# Patient Record
Sex: MALE | Race: ASIAN | NOT HISPANIC OR LATINO | Employment: FULL TIME | URBAN - METROPOLITAN AREA
[De-identification: names, ages, dates, MRNs, and addresses within clinical notes are randomized per-mention and may not be internally consistent; named-entity substitution may affect disease eponyms.]

---

## 2018-08-19 ENCOUNTER — PATIENT OUTREACH (OUTPATIENT)
Dept: HEALTH INFORMATION MANAGEMENT | Facility: OTHER | Age: 33
End: 2018-08-19

## 2018-08-19 ENCOUNTER — APPOINTMENT (OUTPATIENT)
Dept: RADIOLOGY | Facility: MEDICAL CENTER | Age: 33
End: 2018-08-19
Attending: EMERGENCY MEDICINE
Payer: COMMERCIAL

## 2018-08-19 ENCOUNTER — HOSPITAL ENCOUNTER (EMERGENCY)
Facility: MEDICAL CENTER | Age: 33
End: 2018-08-19
Attending: EMERGENCY MEDICINE
Payer: COMMERCIAL

## 2018-08-19 VITALS
HEIGHT: 64 IN | WEIGHT: 211.2 LBS | RESPIRATION RATE: 18 BRPM | BODY MASS INDEX: 36.06 KG/M2 | DIASTOLIC BLOOD PRESSURE: 147 MMHG | SYSTOLIC BLOOD PRESSURE: 191 MMHG | TEMPERATURE: 97.8 F | OXYGEN SATURATION: 99 % | HEART RATE: 96 BPM

## 2018-08-19 DIAGNOSIS — I10 HYPERTENSION, UNSPECIFIED TYPE: ICD-10-CM

## 2018-08-19 DIAGNOSIS — M10.9 ACUTE GOUT OF RIGHT ELBOW, UNSPECIFIED CAUSE: ICD-10-CM

## 2018-08-19 PROCEDURE — 73080 X-RAY EXAM OF ELBOW: CPT | Mod: RT

## 2018-08-19 PROCEDURE — 700102 HCHG RX REV CODE 250 W/ 637 OVERRIDE(OP): Performed by: EMERGENCY MEDICINE

## 2018-08-19 PROCEDURE — 99284 EMERGENCY DEPT VISIT MOD MDM: CPT

## 2018-08-19 PROCEDURE — A9270 NON-COVERED ITEM OR SERVICE: HCPCS | Performed by: EMERGENCY MEDICINE

## 2018-08-19 RX ORDER — INDOMETHACIN 50 MG/1
50 CAPSULE ORAL ONCE
Status: COMPLETED | OUTPATIENT
Start: 2018-08-19 | End: 2018-08-19

## 2018-08-19 RX ORDER — PREDNISONE 20 MG/1
TABLET ORAL
Qty: 24 TAB | Refills: 0 | Status: ON HOLD | OUTPATIENT
Start: 2018-08-19 | End: 2018-11-01

## 2018-08-19 RX ORDER — INDOMETHACIN 50 MG/1
50 CAPSULE ORAL
Status: DISCONTINUED | OUTPATIENT
Start: 2018-08-19 | End: 2018-08-19

## 2018-08-19 RX ORDER — HYDROCODONE BITARTRATE AND ACETAMINOPHEN 5; 325 MG/1; MG/1
1-2 TABLET ORAL EVERY 4 HOURS PRN
Qty: 12 TAB | Refills: 0 | Status: SHIPPED | OUTPATIENT
Start: 2018-08-19 | End: 2018-08-22

## 2018-08-19 RX ORDER — INDOMETHACIN 50 MG/1
50 CAPSULE ORAL 3 TIMES DAILY
Qty: 15 CAP | Refills: 0 | Status: SHIPPED | OUTPATIENT
Start: 2018-08-19 | End: 2018-08-24

## 2018-08-19 RX ADMIN — INDOMETHACIN 50 MG: 50 CAPSULE ORAL at 13:49

## 2018-08-19 ASSESSMENT — PAIN SCALES - GENERAL: PAINLEVEL_OUTOF10: 7

## 2018-08-19 NOTE — ED PROVIDER NOTES
"ED Provider Note    CHIEF COMPLAINT  Chief Complaint   Patient presents with   • Elbow Pain     right elbow pain/swelling since last night. denies injury. hst of gout        HPI  Mina Hickey Sp Burch is a 33 y.o. male who presents for evaluation of right elbow pain and swelling, is a history of gout from the time he was 16 years old with very frequent involvement of his feet but never his elbows, he denies any injury, no fever, no weakness or numbness.  He says this pain is swelling has just creeped up since yesterday.  No other complaints.  The pain is worsened with movement.  He reports his job is very physical requiring a lot of heavy lifting.    REVIEW OF SYSTEMS  Negative for fever, rash, weakness, numbness.    PAST MEDICAL HISTORY   has a past medical history of Hypertension.    SOCIAL HISTORY  Social History     Social History Main Topics   • Smoking status: Current Every Day Smoker   • Smokeless tobacco: Never Used   • Alcohol use No   • Drug use: No   • Sexual activity: Not on file       SURGICAL HISTORY  patient denies any surgical history    CURRENT MEDICATIONS  I personally reviewed the medication list in the charting documentation.     ALLERGIES  No Known Allergies    PHYSICAL EXAM  VITAL SIGNS: BP (!) 215/148   Pulse (!) 106   Temp 36.6 °C (97.9 °F)   Resp 16   Ht 1.626 m (5' 4\")   Wt 95.8 kg (211 lb 3.2 oz)   SpO2 100%   BMI 36.25 kg/m²   Constitutional: Alert in no apparent distress.  HENT: No signs of trauma.   Eyes: Conjunctiva normal, Non-icteric.   Chest: Normal nonlabored respirations  Skin: No erythema, No rash.   Musculoskeletal: Inspection of the right elbow reveals a lot of edema but no erythema, he has generalized tenderness and limited range of motion secondary to pain.  Most of the swelling involves the extensor surface.  Distally he is neurovascularly intact with a normal radial pulse and capillary refill and sensation.  Neurologic: Alert, No focal deficits noted. "   Psychiatric: Affect normal, Judgment normal.    DIAGNOSTIC STUDIES / PROCEDURES    RADIOLOGY  DX-ELBOW-COMPLETE 3+ RIGHT   Final Result      Negative right elbow series            COURSE & MEDICAL DECISION MAKING  Pertinent Labs & Imaging studies reviewed. (See chart for details)    Encounter Summary: This is a 33 y.o. male with right elbow pain and swelling with a long-standing history of gout, he is never had gout of the elbow however given his history and his overall well appearance I suspect that exactly is going on, does not make any sense for this to be a septic elbow given his history and findings on exam.  I will initiate indomethacin treatment here in the emergency department and grab an x-ray just to rule out any other obvious abnormalities.  In the absence of a positive finding on the x-ray, he will be prescribed indomethacin and a prednisone taper, I will go over strict return instructions.  Of note the patient is quite hypertensive here in the emergency department, he has been referred to primary care for further evaluation of this, I have called central scheduling department to facilitate follow-up.      DISPOSITION: Discharge Home      FINAL IMPRESSION  1. Acute gout of right elbow, unspecified cause    2. Hypertension, unspecified type        This dictation was created using voice recognition software. The accuracy of the dictation is limited to the abilities of the software. I expect there may be some errors of grammar and possibly content. The nursing notes were reviewed and certain aspects of this information were incorporated into this note.    Electronically signed by: Israel Vinson, 8/19/2018 12:52 PM

## 2018-08-19 NOTE — ED TRIAGE NOTES
Pt to triage .  Chief Complaint   Patient presents with   • Elbow Pain     right elbow pain/swelling since last night. denies injury. hst of gout

## 2018-08-19 NOTE — ED NOTES
D/C home with written and verbal instructions re: Rx, activity, f/u.  Verbalizes understanding.  Ambulated out with steady gait. Patient did not want norco prescription. RN ripped up prescriptions.

## 2018-08-19 NOTE — DISCHARGE INSTRUCTIONS
Gout  Gout is painful swelling that can occur in some of your joints. Gout is a type of arthritis. This condition is caused by having too much uric acid in your body. Uric acid is a chemical that forms when your body breaks down substances called purines. Purines are important for building body proteins.  When your body has too much uric acid, sharp crystals can form and build up inside your joints. This causes pain and swelling. Gout attacks can happen quickly and be very painful (acute gout). Over time, the attacks can affect more joints and become more frequent (chronic gout). Gout can also cause uric acid to build up under your skin and inside your kidneys.  What are the causes?  This condition is caused by too much uric acid in your blood. This can occur because:  · Your kidneys do not remove enough uric acid from your blood. This is the most common cause.  · Your body makes too much uric acid. This can occur with some cancers and cancer treatments. It can also occur if your body is breaking down too many red blood cells (hemolytic anemia).  · You eat too many foods that are high in purines. These foods include organ meats and some seafood. Alcohol, especially beer, is also high in purines.  A gout attack may be triggered by trauma or stress.  What increases the risk?  This condition is more likely to develop in people who:  · Have a family history of gout.  · Are male and middle-aged.  · Are female and have gone through menopause.  · Are obese.  · Frequently drink alcohol, especially beer.  · Are dehydrated.  · Lose weight too quickly.  · Have an organ transplant.  · Have lead poisoning.  · Take certain medicines, including aspirin, cyclosporine, diuretics, levodopa, and niacin.  · Have kidney disease or psoriasis.  What are the signs or symptoms?  An attack of acute gout happens quickly. It usually occurs in just one joint. The most common place is the big toe. Attacks often start at night. Other joints that  may be affected include joints of the feet, ankle, knee, fingers, wrist, or elbow. Symptoms may include:  · Severe pain.  · Warmth.  · Swelling.  · Stiffness.  · Tenderness. The affected joint may be very painful to touch.  · Shiny, red, or purple skin.  · Chills and fever.  Chronic gout may cause symptoms more frequently. More joints may be involved. You may also have white or yellow lumps (tophi) on your hands or feet or in other areas near your joints.  How is this diagnosed?  This condition is diagnosed based on your symptoms, medical history, and physical exam. You may have tests, such as:  · Blood tests to measure uric acid levels.  · Removal of joint fluid with a needle (aspiration) to look for uric acid crystals.  · X-rays to look for joint damage.  How is this treated?  Treatment for this condition has two phases: treating an acute attack and preventing future attacks. Acute gout treatment may include medicines to reduce pain and swelling, including:  · NSAIDs.  · Steroids. These are strong anti-inflammatory medicines that can be taken by mouth (orally) or injected into a joint.  · Colchicine. This medicine relieves pain and swelling when it is taken soon after an attack. It can be given orally or through an IV tube.  Preventive treatment may include:  · Daily use of smaller doses of NSAIDs or colchicine.  · Use of a medicine that reduces uric acid levels in your blood.  · Changes to your diet. You may need to see a specialist about healthy eating (dietitian).  Follow these instructions at home:  During a Gout Attack  · If directed, apply ice to the affected area:  ¨ Put ice in a plastic bag.  ¨ Place a towel between your skin and the bag.  ¨ Leave the ice on for 20 minutes, 2-3 times a day.  · Rest the joint as much as possible. If the affected joint is in your leg, you may be given crutches to use.  · Raise (elevate) the affected joint above the level of your heart as often as possible.  · Drink enough  fluids to keep your urine clear or pale yellow.  · Take over-the-counter and prescription medicines only as told by your health care provider.  · Do not drive or operate heavy machinery while taking prescription pain medicine.  · Follow instructions from your health care provider about eating or drinking restrictions.  · Return to your normal activities as told by your health care provider. Ask your health care provider what activities are safe for you.  Avoiding Future Gout Attacks  · Follow a low-purine diet as told by your dietitian or health care provider. Avoid foods and drinks that are high in purines, including liver, kidney, anchovies, asparagus, herring, mushrooms, mussels, and beer.  · Limit alcohol intake to no more than 1 drink a day for nonpregnant women and 2 drinks a day for men. One drink equals 12 oz of beer, 5 oz of wine, or 1½ oz of hard liquor.  · Maintain a healthy weight or lose weight if you are overweight. If you want to lose weight, talk with your health care provider. It is important that you do not lose weight too quickly.  · Start or maintain an exercise program as told by your health care provider.  · Drink enough fluids to keep your urine clear or pale yellow.  · Take over-the-counter and prescription medicines only as told by your health care provider.  · Keep all follow-up visits as told by your health care provider. This is important.  Contact a health care provider if:  · You have another gout attack.  · You continue to have symptoms of a gout attack after10 days of treatment.  · You have side effects from your medicines.  · You have chills or a fever.  · You have burning pain when you urinate.  · You have pain in your lower back or belly.  Get help right away if:  · You have severe or uncontrolled pain.  · You cannot urinate.  This information is not intended to replace advice given to you by your health care provider. Make sure you discuss any questions you have with your health  care provider.  Document Released: 12/15/2001 Document Revised: 05/25/2017 Document Reviewed: 09/29/2016  [employer] Interactive Patient Education © 2017 [employer]

## 2018-11-01 ENCOUNTER — APPOINTMENT (OUTPATIENT)
Dept: CARDIOLOGY | Facility: MEDICAL CENTER | Age: 33
DRG: 247 | End: 2018-11-01
Attending: HOSPITALIST

## 2018-11-01 ENCOUNTER — APPOINTMENT (OUTPATIENT)
Dept: RADIOLOGY | Facility: MEDICAL CENTER | Age: 33
DRG: 247 | End: 2018-11-01
Attending: EMERGENCY MEDICINE

## 2018-11-01 ENCOUNTER — HOSPITAL ENCOUNTER (INPATIENT)
Facility: MEDICAL CENTER | Age: 33
LOS: 1 days | DRG: 247 | End: 2018-11-02
Attending: EMERGENCY MEDICINE | Admitting: HOSPITALIST

## 2018-11-01 DIAGNOSIS — I10 HYPERTENSION, UNSPECIFIED TYPE: ICD-10-CM

## 2018-11-01 DIAGNOSIS — R79.89 ELEVATED TROPONIN: ICD-10-CM

## 2018-11-01 PROBLEM — E11.65 TYPE 2 DIABETES MELLITUS WITH HYPERGLYCEMIA, WITHOUT LONG-TERM CURRENT USE OF INSULIN (HCC): Status: ACTIVE | Noted: 2018-11-01

## 2018-11-01 PROBLEM — I25.119 CORONARY ARTERY DISEASE INVOLVING NATIVE CORONARY ARTERY OF NATIVE HEART WITH ANGINA PECTORIS (HCC): Status: ACTIVE | Noted: 2018-11-01

## 2018-11-01 PROBLEM — E66.811 CLASS 1 OBESITY DUE TO EXCESS CALORIES WITHOUT SERIOUS COMORBIDITY WITH BODY MASS INDEX (BMI) OF 34.0 TO 34.9 IN ADULT: Status: ACTIVE | Noted: 2018-11-01

## 2018-11-01 PROBLEM — I21.3 STEMI (ST ELEVATION MYOCARDIAL INFARCTION) (HCC): Status: RESOLVED | Noted: 2018-11-01 | Resolved: 2018-11-01

## 2018-11-01 PROBLEM — I47.20 VENTRICULAR TACHYCARDIA (HCC): Status: ACTIVE | Noted: 2018-11-01

## 2018-11-01 PROBLEM — I21.4 NON-STEMI (NON-ST ELEVATED MYOCARDIAL INFARCTION) (HCC): Status: ACTIVE | Noted: 2018-11-01

## 2018-11-01 PROBLEM — I21.3 STEMI (ST ELEVATION MYOCARDIAL INFARCTION) (HCC): Status: ACTIVE | Noted: 2018-11-01

## 2018-11-01 PROBLEM — E66.09 CLASS 1 OBESITY DUE TO EXCESS CALORIES WITHOUT SERIOUS COMORBIDITY WITH BODY MASS INDEX (BMI) OF 34.0 TO 34.9 IN ADULT: Status: ACTIVE | Noted: 2018-11-01

## 2018-11-01 PROBLEM — I16.1 HYPERTENSIVE EMERGENCY: Status: ACTIVE | Noted: 2018-11-01

## 2018-11-01 LAB
ALBUMIN SERPL BCP-MCNC: 4.3 G/DL (ref 3.2–4.9)
ALBUMIN/GLOB SERPL: 1.4 G/DL
ALP SERPL-CCNC: 60 U/L (ref 30–99)
ALT SERPL-CCNC: 19 U/L (ref 2–50)
ANION GAP SERPL CALC-SCNC: 11 MMOL/L (ref 0–11.9)
APTT PPP: 30.7 SEC (ref 24.7–36)
AST SERPL-CCNC: 19 U/L (ref 12–45)
BASOPHILS # BLD AUTO: 0.7 % (ref 0–1.8)
BASOPHILS # BLD: 0.1 K/UL (ref 0–0.12)
BILIRUB SERPL-MCNC: 0.6 MG/DL (ref 0.1–1.5)
BNP SERPL-MCNC: 234 PG/ML (ref 0–100)
BUN SERPL-MCNC: 17 MG/DL (ref 8–22)
CALCIUM SERPL-MCNC: 9.5 MG/DL (ref 8.5–10.5)
CHLORIDE SERPL-SCNC: 104 MMOL/L (ref 96–112)
CO2 SERPL-SCNC: 24 MMOL/L (ref 20–33)
CREAT SERPL-MCNC: 1.08 MG/DL (ref 0.5–1.4)
D DIMER PPP IA.FEU-MCNC: <0.4 UG/ML (FEU) (ref 0–0.5)
EKG IMPRESSION: NORMAL
EKG IMPRESSION: NORMAL
EOSINOPHIL # BLD AUTO: 0.17 K/UL (ref 0–0.51)
EOSINOPHIL NFR BLD: 1.1 % (ref 0–6.9)
ERYTHROCYTE [DISTWIDTH] IN BLOOD BY AUTOMATED COUNT: 38.4 FL (ref 35.9–50)
EST. AVERAGE GLUCOSE BLD GHB EST-MCNC: 177 MG/DL
GLOBULIN SER CALC-MCNC: 3.1 G/DL (ref 1.9–3.5)
GLUCOSE SERPL-MCNC: 157 MG/DL (ref 65–99)
HBA1C MFR BLD: 7.8 % (ref 0–5.6)
HCT VFR BLD AUTO: 50.2 % (ref 42–52)
HGB BLD-MCNC: 16.9 G/DL (ref 14–18)
IMM GRANULOCYTES # BLD AUTO: 0.06 K/UL (ref 0–0.11)
IMM GRANULOCYTES NFR BLD AUTO: 0.4 % (ref 0–0.9)
INR PPP: 1.05 (ref 0.87–1.13)
LIPASE SERPL-CCNC: 4 U/L (ref 11–82)
LYMPHOCYTES # BLD AUTO: 3.74 K/UL (ref 1–4.8)
LYMPHOCYTES NFR BLD: 25 % (ref 22–41)
MAGNESIUM SERPL-MCNC: 1.9 MG/DL (ref 1.5–2.5)
MCH RBC QN AUTO: 28.4 PG (ref 27–33)
MCHC RBC AUTO-ENTMCNC: 33.7 G/DL (ref 33.7–35.3)
MCV RBC AUTO: 84.2 FL (ref 81.4–97.8)
MONOCYTES # BLD AUTO: 0.7 K/UL (ref 0–0.85)
MONOCYTES NFR BLD AUTO: 4.7 % (ref 0–13.4)
NEUTROPHILS # BLD AUTO: 10.18 K/UL (ref 1.82–7.42)
NEUTROPHILS NFR BLD: 68.1 % (ref 44–72)
NRBC # BLD AUTO: 0 K/UL
NRBC BLD-RTO: 0 /100 WBC
PLATELET # BLD AUTO: 427 K/UL (ref 164–446)
PMV BLD AUTO: 9.5 FL (ref 9–12.9)
POTASSIUM SERPL-SCNC: 4 MMOL/L (ref 3.6–5.5)
PROT SERPL-MCNC: 7.4 G/DL (ref 6–8.2)
PROTHROMBIN TIME: 13.8 SEC (ref 12–14.6)
RBC # BLD AUTO: 5.96 M/UL (ref 4.7–6.1)
SODIUM SERPL-SCNC: 139 MMOL/L (ref 135–145)
TROPONIN I SERPL-MCNC: 0.54 NG/ML (ref 0–0.04)
TROPONIN I SERPL-MCNC: 1.61 NG/ML (ref 0–0.04)
TROPONIN I SERPL-MCNC: 9.59 NG/ML (ref 0–0.04)
WBC # BLD AUTO: 15 K/UL (ref 4.8–10.8)

## 2018-11-01 PROCEDURE — 700111 HCHG RX REV CODE 636 W/ 250 OVERRIDE (IP): Performed by: HOSPITALIST

## 2018-11-01 PROCEDURE — 700111 HCHG RX REV CODE 636 W/ 250 OVERRIDE (IP)

## 2018-11-01 PROCEDURE — A9270 NON-COVERED ITEM OR SERVICE: HCPCS | Performed by: HOSPITALIST

## 2018-11-01 PROCEDURE — 307093 HCHG TR BAND RADIAL

## 2018-11-01 PROCEDURE — 99291 CRITICAL CARE FIRST HOUR: CPT | Mod: 25 | Performed by: HOSPITALIST

## 2018-11-01 PROCEDURE — A9270 NON-COVERED ITEM OR SERVICE: HCPCS | Performed by: EMERGENCY MEDICINE

## 2018-11-01 PROCEDURE — 99291 CRITICAL CARE FIRST HOUR: CPT

## 2018-11-01 PROCEDURE — 700102 HCHG RX REV CODE 250 W/ 637 OVERRIDE(OP)

## 2018-11-01 PROCEDURE — C1887 CATHETER, GUIDING: HCPCS

## 2018-11-01 PROCEDURE — 83880 ASSAY OF NATRIURETIC PEPTIDE: CPT

## 2018-11-01 PROCEDURE — 93005 ELECTROCARDIOGRAM TRACING: CPT

## 2018-11-01 PROCEDURE — 83036 HEMOGLOBIN GLYCOSYLATED A1C: CPT

## 2018-11-01 PROCEDURE — C1725 CATH, TRANSLUMIN NON-LASER: HCPCS

## 2018-11-01 PROCEDURE — 700111 HCHG RX REV CODE 636 W/ 250 OVERRIDE (IP): Performed by: INTERNAL MEDICINE

## 2018-11-01 PROCEDURE — 700102 HCHG RX REV CODE 250 W/ 637 OVERRIDE(OP): Performed by: HOSPITALIST

## 2018-11-01 PROCEDURE — B2151ZZ FLUOROSCOPY OF LEFT HEART USING LOW OSMOLAR CONTRAST: ICD-10-PCS | Performed by: INTERNAL MEDICINE

## 2018-11-01 PROCEDURE — C1874 STENT, COATED/COV W/DEL SYS: HCPCS

## 2018-11-01 PROCEDURE — C1769 GUIDE WIRE: HCPCS

## 2018-11-01 PROCEDURE — 83735 ASSAY OF MAGNESIUM: CPT

## 2018-11-01 PROCEDURE — 36415 COLL VENOUS BLD VENIPUNCTURE: CPT

## 2018-11-01 PROCEDURE — 93308 TTE F-UP OR LMTD: CPT | Mod: 26 | Performed by: INTERNAL MEDICINE

## 2018-11-01 PROCEDURE — 304952 HCHG R 2 PADS

## 2018-11-01 PROCEDURE — 85379 FIBRIN DEGRADATION QUANT: CPT

## 2018-11-01 PROCEDURE — C1894 INTRO/SHEATH, NON-LASER: HCPCS

## 2018-11-01 PROCEDURE — 770022 HCHG ROOM/CARE - ICU (200)

## 2018-11-01 PROCEDURE — 83690 ASSAY OF LIPASE: CPT

## 2018-11-01 PROCEDURE — 93005 ELECTROCARDIOGRAM TRACING: CPT | Performed by: EMERGENCY MEDICINE

## 2018-11-01 PROCEDURE — A9270 NON-COVERED ITEM OR SERVICE: HCPCS

## 2018-11-01 PROCEDURE — 700102 HCHG RX REV CODE 250 W/ 637 OVERRIDE(OP): Performed by: EMERGENCY MEDICINE

## 2018-11-01 PROCEDURE — 71045 X-RAY EXAM CHEST 1 VIEW: CPT

## 2018-11-01 PROCEDURE — 85025 COMPLETE CBC W/AUTO DIFF WBC: CPT

## 2018-11-01 PROCEDURE — B2111ZZ FLUOROSCOPY OF MULTIPLE CORONARY ARTERIES USING LOW OSMOLAR CONTRAST: ICD-10-PCS | Performed by: INTERNAL MEDICINE

## 2018-11-01 PROCEDURE — 92928 PRQ TCAT PLMT NTRAC ST 1 LES: CPT | Mod: LC | Performed by: INTERNAL MEDICINE

## 2018-11-01 PROCEDURE — 93005 ELECTROCARDIOGRAM TRACING: CPT | Performed by: INTERNAL MEDICINE

## 2018-11-01 PROCEDURE — 99291 CRITICAL CARE FIRST HOUR: CPT | Performed by: INTERNAL MEDICINE

## 2018-11-01 PROCEDURE — 93306 TTE W/DOPPLER COMPLETE: CPT

## 2018-11-01 PROCEDURE — C9600 PERC DRUG-EL COR STENT SING: HCPCS | Mod: LC

## 2018-11-01 PROCEDURE — 84484 ASSAY OF TROPONIN QUANT: CPT | Mod: 91

## 2018-11-01 PROCEDURE — 700105 HCHG RX REV CODE 258: Performed by: INTERNAL MEDICINE

## 2018-11-01 PROCEDURE — 93010 ELECTROCARDIOGRAM REPORT: CPT | Performed by: INTERNAL MEDICINE

## 2018-11-01 PROCEDURE — 700105 HCHG RX REV CODE 258: Performed by: HOSPITALIST

## 2018-11-01 PROCEDURE — 027034Z DILATION OF CORONARY ARTERY, ONE ARTERY WITH DRUG-ELUTING INTRALUMINAL DEVICE, PERCUTANEOUS APPROACH: ICD-10-PCS | Performed by: INTERNAL MEDICINE

## 2018-11-01 PROCEDURE — 99152 MOD SED SAME PHYS/QHP 5/>YRS: CPT

## 2018-11-01 PROCEDURE — 96374 THER/PROPH/DIAG INJ IV PUSH: CPT

## 2018-11-01 PROCEDURE — 99223 1ST HOSP IP/OBS HIGH 75: CPT | Mod: AI | Performed by: HOSPITALIST

## 2018-11-01 PROCEDURE — 85730 THROMBOPLASTIN TIME PARTIAL: CPT

## 2018-11-01 PROCEDURE — 99255 IP/OBS CONSLTJ NEW/EST HI 80: CPT | Mod: GC | Performed by: INTERNAL MEDICINE

## 2018-11-01 PROCEDURE — 93458 L HRT ARTERY/VENTRICLE ANGIO: CPT | Mod: 26,59 | Performed by: INTERNAL MEDICINE

## 2018-11-01 PROCEDURE — 80053 COMPREHEN METABOLIC PANEL: CPT

## 2018-11-01 PROCEDURE — 700101 HCHG RX REV CODE 250

## 2018-11-01 PROCEDURE — 85610 PROTHROMBIN TIME: CPT

## 2018-11-01 PROCEDURE — 99153 MOD SED SAME PHYS/QHP EA: CPT

## 2018-11-01 PROCEDURE — 93458 L HRT ARTERY/VENTRICLE ANGIO: CPT

## 2018-11-01 PROCEDURE — 99152 MOD SED SAME PHYS/QHP 5/>YRS: CPT | Performed by: INTERNAL MEDICINE

## 2018-11-01 PROCEDURE — 4A023N7 MEASUREMENT OF CARDIAC SAMPLING AND PRESSURE, LEFT HEART, PERCUTANEOUS APPROACH: ICD-10-PCS | Performed by: INTERNAL MEDICINE

## 2018-11-01 RX ORDER — POLYETHYLENE GLYCOL 3350 17 G/17G
1 POWDER, FOR SOLUTION ORAL
Status: DISCONTINUED | OUTPATIENT
Start: 2018-11-01 | End: 2018-11-02 | Stop reason: HOSPADM

## 2018-11-01 RX ORDER — PROMETHAZINE HYDROCHLORIDE 25 MG/1
12.5-25 TABLET ORAL EVERY 4 HOURS PRN
Status: DISCONTINUED | OUTPATIENT
Start: 2018-11-01 | End: 2018-11-02 | Stop reason: HOSPADM

## 2018-11-01 RX ORDER — MIDAZOLAM HYDROCHLORIDE 1 MG/ML
INJECTION INTRAMUSCULAR; INTRAVENOUS
Status: COMPLETED
Start: 2018-11-01 | End: 2018-11-01

## 2018-11-01 RX ORDER — ONDANSETRON 2 MG/ML
4 INJECTION INTRAMUSCULAR; INTRAVENOUS EVERY 4 HOURS PRN
Status: DISCONTINUED | OUTPATIENT
Start: 2018-11-01 | End: 2018-11-02 | Stop reason: HOSPADM

## 2018-11-01 RX ORDER — NITROGLYCERIN 0.4 MG/1
0.4 TABLET SUBLINGUAL
Status: DISCONTINUED | OUTPATIENT
Start: 2018-11-01 | End: 2018-11-02 | Stop reason: HOSPADM

## 2018-11-01 RX ORDER — BIVALIRUDIN 250 MG/5ML
INJECTION, POWDER, LYOPHILIZED, FOR SOLUTION INTRAVENOUS
Status: COMPLETED
Start: 2018-11-01 | End: 2018-11-01

## 2018-11-01 RX ORDER — PRASUGREL 10 MG/1
60 TABLET, FILM COATED ORAL ONCE
Status: ACTIVE | OUTPATIENT
Start: 2018-11-01 | End: 2018-11-02

## 2018-11-01 RX ORDER — PROMETHAZINE HYDROCHLORIDE 25 MG/1
12.5-25 SUPPOSITORY RECTAL EVERY 4 HOURS PRN
Status: DISCONTINUED | OUTPATIENT
Start: 2018-11-01 | End: 2018-11-02 | Stop reason: HOSPADM

## 2018-11-01 RX ORDER — PRASUGREL 10 MG/1
TABLET, FILM COATED ORAL
Status: COMPLETED
Start: 2018-11-01 | End: 2018-11-01

## 2018-11-01 RX ORDER — METOPROLOL TARTRATE 50 MG/1
50 TABLET, FILM COATED ORAL TWICE DAILY
Status: DISCONTINUED | OUTPATIENT
Start: 2018-11-01 | End: 2018-11-02 | Stop reason: HOSPADM

## 2018-11-01 RX ORDER — LIDOCAINE HYDROCHLORIDE 20 MG/ML
INJECTION, SOLUTION INFILTRATION; PERINEURAL
Status: COMPLETED
Start: 2018-11-01 | End: 2018-11-01

## 2018-11-01 RX ORDER — ALPRAZOLAM 1 MG/1
1 TABLET ORAL 4 TIMES DAILY PRN
Status: DISCONTINUED | OUTPATIENT
Start: 2018-11-01 | End: 2018-11-02 | Stop reason: HOSPADM

## 2018-11-01 RX ORDER — PRASUGREL 10 MG/1
10 TABLET, FILM COATED ORAL DAILY
Status: DISCONTINUED | OUTPATIENT
Start: 2018-11-02 | End: 2018-11-02 | Stop reason: HOSPADM

## 2018-11-01 RX ORDER — NITROGLYCERIN 20 MG/100ML
0-200 INJECTION INTRAVENOUS CONTINUOUS
Status: DISCONTINUED | OUTPATIENT
Start: 2018-11-01 | End: 2018-11-02

## 2018-11-01 RX ORDER — AMOXICILLIN 250 MG
2 CAPSULE ORAL 2 TIMES DAILY
Status: DISCONTINUED | OUTPATIENT
Start: 2018-11-01 | End: 2018-11-02 | Stop reason: HOSPADM

## 2018-11-01 RX ORDER — SODIUM CHLORIDE 9 MG/ML
INJECTION, SOLUTION INTRAVENOUS CONTINUOUS
Status: ACTIVE | OUTPATIENT
Start: 2018-11-01 | End: 2018-11-01

## 2018-11-01 RX ORDER — CLONIDINE HYDROCHLORIDE 0.1 MG/1
0.1 TABLET ORAL EVERY 6 HOURS PRN
Status: DISCONTINUED | OUTPATIENT
Start: 2018-11-01 | End: 2018-11-02 | Stop reason: HOSPADM

## 2018-11-01 RX ORDER — HEPARIN SODIUM,PORCINE 1000/ML
VIAL (ML) INJECTION
Status: COMPLETED
Start: 2018-11-01 | End: 2018-11-01

## 2018-11-01 RX ORDER — HEPARIN SODIUM 1000 [USP'U]/ML
6000 INJECTION, SOLUTION INTRAVENOUS; SUBCUTANEOUS ONCE
Status: COMPLETED | OUTPATIENT
Start: 2018-11-01 | End: 2018-11-01

## 2018-11-01 RX ORDER — NITROGLYCERIN 0.4 MG/1
0.4 TABLET SUBLINGUAL ONCE
Status: COMPLETED | OUTPATIENT
Start: 2018-11-01 | End: 2018-11-01

## 2018-11-01 RX ORDER — BISACODYL 10 MG
10 SUPPOSITORY, RECTAL RECTAL
Status: DISCONTINUED | OUTPATIENT
Start: 2018-11-01 | End: 2018-11-02 | Stop reason: HOSPADM

## 2018-11-01 RX ORDER — HEPARIN SODIUM 1000 [USP'U]/ML
3200 INJECTION, SOLUTION INTRAVENOUS; SUBCUTANEOUS PRN
Status: DISCONTINUED | OUTPATIENT
Start: 2018-11-01 | End: 2018-11-01

## 2018-11-01 RX ORDER — ONDANSETRON 4 MG/1
4 TABLET, ORALLY DISINTEGRATING ORAL EVERY 4 HOURS PRN
Status: DISCONTINUED | OUTPATIENT
Start: 2018-11-01 | End: 2018-11-02 | Stop reason: HOSPADM

## 2018-11-01 RX ORDER — ASPIRIN 325 MG
325 TABLET ORAL DAILY
Status: DISCONTINUED | OUTPATIENT
Start: 2018-11-01 | End: 2018-11-01

## 2018-11-01 RX ORDER — SODIUM CHLORIDE 9 MG/ML
INJECTION, SOLUTION INTRAVENOUS CONTINUOUS
Status: DISCONTINUED | OUTPATIENT
Start: 2018-11-01 | End: 2018-11-01

## 2018-11-01 RX ORDER — ASPIRIN 300 MG/1
300 SUPPOSITORY RECTAL DAILY
Status: DISCONTINUED | OUTPATIENT
Start: 2018-11-01 | End: 2018-11-01

## 2018-11-01 RX ORDER — MAGNESIUM SULFATE HEPTAHYDRATE 40 MG/ML
2 INJECTION, SOLUTION INTRAVENOUS ONCE
Status: COMPLETED | OUTPATIENT
Start: 2018-11-01 | End: 2018-11-01

## 2018-11-01 RX ORDER — VERAPAMIL HYDROCHLORIDE 2.5 MG/ML
INJECTION, SOLUTION INTRAVENOUS
Status: COMPLETED
Start: 2018-11-01 | End: 2018-11-01

## 2018-11-01 RX ORDER — ACETAMINOPHEN 325 MG/1
650 TABLET ORAL EVERY 6 HOURS PRN
Status: DISCONTINUED | OUTPATIENT
Start: 2018-11-01 | End: 2018-11-02 | Stop reason: HOSPADM

## 2018-11-01 RX ORDER — ASPIRIN 81 MG/1
324 TABLET, CHEWABLE ORAL DAILY
Status: DISCONTINUED | OUTPATIENT
Start: 2018-11-01 | End: 2018-11-01

## 2018-11-01 RX ORDER — ATORVASTATIN CALCIUM 40 MG/1
40 TABLET, FILM COATED ORAL
Status: DISCONTINUED | OUTPATIENT
Start: 2018-11-01 | End: 2018-11-02 | Stop reason: HOSPADM

## 2018-11-01 RX ADMIN — HEPARIN SODIUM 2000 UNITS: 1000 INJECTION, SOLUTION INTRAVENOUS; SUBCUTANEOUS at 14:50

## 2018-11-01 RX ADMIN — VERAPAMIL HYDROCHLORIDE 5 MG: 2.5 INJECTION, SOLUTION INTRAVENOUS at 14:40

## 2018-11-01 RX ADMIN — METOPROLOL TARTRATE 50 MG: 50 TABLET ORAL at 08:49

## 2018-11-01 RX ADMIN — ENOXAPARIN SODIUM 40 MG: 100 INJECTION SUBCUTANEOUS at 05:10

## 2018-11-01 RX ADMIN — MAGNESIUM SULFATE IN WATER 2 G: 40 INJECTION, SOLUTION INTRAVENOUS at 15:36

## 2018-11-01 RX ADMIN — HEPARIN SODIUM: 1000 INJECTION, SOLUTION INTRAVENOUS; SUBCUTANEOUS at 14:50

## 2018-11-01 RX ADMIN — NITROGLYCERIN 10 MCG/MIN: 20 INJECTION INTRAVENOUS at 03:45

## 2018-11-01 RX ADMIN — NITROGLYCERIN 60 MCG/MIN: 20 INJECTION INTRAVENOUS at 11:14

## 2018-11-01 RX ADMIN — ATORVASTATIN CALCIUM 40 MG: 40 TABLET, FILM COATED ORAL at 20:41

## 2018-11-01 RX ADMIN — ASPIRIN 324 MG: 81 TABLET, CHEWABLE ORAL at 03:44

## 2018-11-01 RX ADMIN — BIVALIRUDIN 0.2 MG/KG/HR: 250 INJECTION, POWDER, LYOPHILIZED, FOR SOLUTION INTRAVENOUS at 15:07

## 2018-11-01 RX ADMIN — HEPARIN SODIUM 1200 UNITS/HR: 5000 INJECTION, SOLUTION INTRAVENOUS at 08:43

## 2018-11-01 RX ADMIN — HEPARIN SODIUM 6000 UNITS: 1000 INJECTION, SOLUTION INTRAVENOUS; SUBCUTANEOUS at 08:43

## 2018-11-01 RX ADMIN — PRASUGREL 60 MG: 10 TABLET, FILM COATED ORAL at 15:09

## 2018-11-01 RX ADMIN — SODIUM CHLORIDE: 9 INJECTION, SOLUTION INTRAVENOUS at 04:10

## 2018-11-01 RX ADMIN — NITROGLYCERIN 0.4 MG: 0.4 TABLET SUBLINGUAL at 01:25

## 2018-11-01 RX ADMIN — NITROGLYCERIN 0.5 INCH: 20 OINTMENT TOPICAL at 02:41

## 2018-11-01 RX ADMIN — BIVALIRUDIN 250 MG: 250 INJECTION, POWDER, LYOPHILIZED, FOR SOLUTION INTRAVENOUS at 14:48

## 2018-11-01 RX ADMIN — NITROGLYCERIN 10 ML: 20 INJECTION INTRAVENOUS at 14:40

## 2018-11-01 RX ADMIN — MIDAZOLAM HYDROCHLORIDE 2 MG: 1 INJECTION, SOLUTION INTRAMUSCULAR; INTRAVENOUS at 15:08

## 2018-11-01 RX ADMIN — LIDOCAINE HYDROCHLORIDE: 20 INJECTION, SOLUTION INFILTRATION; PERINEURAL at 14:40

## 2018-11-01 RX ADMIN — FENTANYL CITRATE 100 MCG: 50 INJECTION INTRAMUSCULAR; INTRAVENOUS at 15:08

## 2018-11-01 RX ADMIN — METOPROLOL TARTRATE 50 MG: 50 TABLET ORAL at 17:56

## 2018-11-01 ASSESSMENT — PAIN SCALES - GENERAL
PAINLEVEL_OUTOF10: 1
PAINLEVEL_OUTOF10: 1
PAINLEVEL_OUTOF10: 0
PAINLEVEL_OUTOF10: 7
PAINLEVEL_OUTOF10: 0
PAINLEVEL_OUTOF10: 6
PAINLEVEL_OUTOF10: 0
PAINLEVEL_OUTOF10: 0
PAINLEVEL_OUTOF10: 7
PAINLEVEL_OUTOF10: 3
PAINLEVEL_OUTOF10: 7
PAINLEVEL_OUTOF10: 3
PAINLEVEL_OUTOF10: 6

## 2018-11-01 ASSESSMENT — COGNITIVE AND FUNCTIONAL STATUS - GENERAL
SUGGESTED CMS G CODE MODIFIER DAILY ACTIVITY: CH
DAILY ACTIVITIY SCORE: 24
MOBILITY SCORE: 24
SUGGESTED CMS G CODE MODIFIER MOBILITY: CH

## 2018-11-01 ASSESSMENT — ENCOUNTER SYMPTOMS
EYE DISCHARGE: 0
FOCAL WEAKNESS: 0
FALLS: 0
MUSCULOSKELETAL NEGATIVE: 1
SPEECH CHANGE: 0
WEAKNESS: 1
NECK PAIN: 0
NAUSEA: 0
EYE REDNESS: 0
SHORTNESS OF BREATH: 0
NERVOUS/ANXIOUS: 1
GASTROINTESTINAL NEGATIVE: 1
CHILLS: 0
NERVOUS/ANXIOUS: 0
BLOOD IN STOOL: 0
DIARRHEA: 0
LOSS OF CONSCIOUSNESS: 0
EYE PAIN: 0
PALPITATIONS: 0
RESPIRATORY NEGATIVE: 1
WEAKNESS: 0
FLANK PAIN: 0
BACK PAIN: 0
COUGH: 0
MEMORY LOSS: 0
EYES NEGATIVE: 1
ORTHOPNEA: 0
NUMBNESS: 0
BLURRED VISION: 0
HEMOPTYSIS: 0
STRIDOR: 0
CONSTITUTIONAL NEGATIVE: 1
SEIZURES: 0
DIZZINESS: 0
ABDOMINAL PAIN: 0
HALLUCINATIONS: 0
NEUROLOGICAL NEGATIVE: 1
SPUTUM PRODUCTION: 0
HEADACHES: 0
VOMITING: 0
FEVER: 0

## 2018-11-01 ASSESSMENT — LIFESTYLE VARIABLES
PACK_YEARS: 19
SUBSTANCE_ABUSE: 0
EVER_SMOKED: YES
DO YOU DRINK ALCOHOL: NO

## 2018-11-01 ASSESSMENT — PATIENT HEALTH QUESTIONNAIRE - PHQ9
1. LITTLE INTEREST OR PLEASURE IN DOING THINGS: NOT AT ALL
SUM OF ALL RESPONSES TO PHQ9 QUESTIONS 1 AND 2: 0
1. LITTLE INTEREST OR PLEASURE IN DOING THINGS: NOT AT ALL
2. FEELING DOWN, DEPRESSED, IRRITABLE, OR HOPELESS: NOT AT ALL
2. FEELING DOWN, DEPRESSED, IRRITABLE, OR HOPELESS: NOT AT ALL
SUM OF ALL RESPONSES TO PHQ9 QUESTIONS 1 AND 2: 0

## 2018-11-01 ASSESSMENT — PAIN DESCRIPTION - DESCRIPTORS: DESCRIPTORS: PRESSURE

## 2018-11-01 ASSESSMENT — COPD QUESTIONNAIRES
DO YOU EVER COUGH UP ANY MUCUS OR PHLEGM?: NO/ONLY WITH OCCASIONAL COLDS OR INFECTIONS
IN THE PAST 12 MONTHS DO YOU DO LESS THAN YOU USED TO BECAUSE OF YOUR BREATHING PROBLEMS: DISAGREE/UNSURE
HAVE YOU SMOKED AT LEAST 100 CIGARETTES IN YOUR ENTIRE LIFE: YES
COPD SCREENING SCORE: 3
DURING THE PAST 4 WEEKS HOW MUCH DID YOU FEEL SHORT OF BREATH: SOME OF THE TIME

## 2018-11-01 NOTE — ASSESSMENT & PLAN NOTE
With stenting to LAD x2 in 2017 with noncompliance to medical therapy  Reinforced compliance with lifestyle changes in medical therapy  Asked RN to try to obtain records from previous coronary angiography and intervention

## 2018-11-01 NOTE — ASSESSMENT & PLAN NOTE
Likely secondary to his non-STEMI  Nonsustained  Replete magnesium  Continue telemetry monitoring  Start metoprolol  Follow-up on echo

## 2018-11-01 NOTE — ASSESSMENT & PLAN NOTE
Patient with 2 prior stents off all medication    Restart aspirin Plavix heparin for NSTEMI and potential concern for in-stent thrombosis    Cardiology consulted    Start beta-blockade

## 2018-11-01 NOTE — ED TRIAGE NOTES
"Chief Complaint   Patient presents with   • Chest Pain     since this am     Blood pressure (!) 204/152, pulse (!) 106, temperature 36.3 °C (97.4 °F), resp. rate 16, height 1.626 m (5' 4\"), weight 92.3 kg (203 lb 7.8 oz), SpO2 97 %.    Pt ambulates with a slow gait to triage, c/o chest pressure that radiates to the L and R. Pt reports he should be taking BP medication, has not taken medication for 2 months. Pt denies N/V, dizziness, sweating. Pt's father had his first MI in his mid 40's, has had several stints placed.   "

## 2018-11-01 NOTE — CONSULTS
Critical Care Consultation    Date of consult: 11/1/2018    Referring Physician  Iban Dunlap M.D.    Reason for Consultation  Hypertensive emergency    History of Presenting Illness  33 y.o. male with history of coronary artery disease status post 2 stents, diabetes mellitus, and essential hypertension, for which she he is not taking any medications due to insurance difficulty was in his usual state of health until the day prior to admission.  He reports while lying in bed, having the sudden onset of chest pressure spreading across his entire chest with no radiation to his neck or arms.  He reports no exacerbating or alleviating factors.  He was subsequently brought to the emergency department for further evaluation.  He denies associated shortness of breath he does have some anxiety associated with the pain.  No other complaints. Taken from Dr Alexis note.     ON EVENTS:   CP 6/10 on heparin gtt  snr 10 beat vtach  sbp 140-200's  Ns 83ml  Inc trops  Afebrile  NPO for procedure  Urine 450  No yang  Room air  Mg low      Code Status  Full Code    Review of Systems  Review of Systems   Constitutional: Negative for chills and fever.   Respiratory: Negative for shortness of breath.    Cardiovascular: Positive for chest pain. Negative for palpitations and leg swelling.   Gastrointestinal: Negative for abdominal pain, nausea and vomiting.   Musculoskeletal: Negative for back pain.   Neurological: Positive for weakness. Negative for dizziness, numbness and headaches.   All other systems reviewed and are negative.       Past Medical History   has a past medical history of Diabetes (HCC); Hypertension; and Myocardial infarct (HCC).    Surgical History   has no past surgical history on file.    Family History  family history includes Diabetes in his mother; Heart Attack (age of onset: 45) in his father; Heart Disease in his father; Hypertension in his father.    Social History   reports that he has been smoking.  He  has never used smokeless tobacco. He reports that he does not drink alcohol or use drugs.    Medications  Home Medications     Reviewed by Kristie Ryan R.N. (Registered Nurse) on 11/01/18 at 0444  Med List Status: Not Addressed   Medication Last Dose Status        Patient Dontrell Taking any Medications                     Current Facility-Administered Medications   Medication Dose Route Frequency Provider Last Rate Last Dose   • nitroglycerin (NITROSTAT) tablet 0.4 mg  0.4 mg Sublingual Q5 MIN PRN Jones Doran M.D.       • nitroglycerin 50 mg in D5W 250 ml infusion  0-200 mcg/min Intravenous Continuous Jatinder Alexis M.D.   Stopped at 11/01/18 1518   • acetaminophen (TYLENOL) tablet 650 mg  650 mg Oral Q6HRS PRN Jatinder Alexis M.D.       • cloNIDine (CATAPRES) tablet 0.1 mg  0.1 mg Oral Q6HRS PRN Jatinder Alexis M.D.       • ondansetron (ZOFRAN) syringe/vial injection 4 mg  4 mg Intravenous Q4HRS PRN Jatinder Alexis M.D.       • ondansetron (ZOFRAN ODT) dispertab 4 mg  4 mg Oral Q4HRS PRN Jatinder Alexis M.D.       • promethazine (PHENERGAN) tablet 12.5-25 mg  12.5-25 mg Oral Q4HRS PRN Jatinder Alexis M.D.       • promethazine (PHENERGAN) suppository 12.5-25 mg  12.5-25 mg Rectal Q4HRS PRN Jatinder Alexis M.D.       • prochlorperazine (COMPAZINE) injection 5-10 mg  5-10 mg Intravenous Q4HRS PRN Jatinder Alexis M.D.       • senna-docusate (PERICOLACE or SENOKOT S) 8.6-50 MG per tablet 2 Tab  2 Tab Oral BID Jatinder Alexis M.D.        And   • polyethylene glycol/lytes (MIRALAX) PACKET 1 Packet  1 Packet Oral QDAY PRN Jatinder Alexis M.D.        And   • magnesium hydroxide (MILK OF MAGNESIA) suspension 30 mL  30 mL Oral QDAY PRN Jatinder Alexis M.D.        And   • bisacodyl (DULCOLAX) suppository 10 mg  10 mg Rectal QDAY PRN Jatinder Alexis M.D.       • metoprolol (LOPRESSOR) tablet 50 mg  50 mg Oral TWICE DAILY Iban Dunlap M.D.   50 mg at 11/01/18 0849   • atorvastatin (LIPITOR) tablet 40 mg  40 mg  Oral QHS Iban Dunlap M.D.       • magnesium sulfate IVPB premix 2 g  2 g Intravenous Once Iban Dunlap M.D. 25 mL/hr at 11/01/18 1536 2 g at 11/01/18 1536   • NS infusion   Intravenous Continuous Ilya Huffman M.D. 125 mL/hr at 11/01/18 1536     • aspirin EC (ECOTRIN) tablet 81 mg  81 mg Oral DAILY Ilya Huffman M.D.   Stopped at 11/01/18 1600   • bivalirudin (ANGIOMAX) 250 mg in NS 50 mL Infusion  0.2 mg/kg/hr Intravenous Continuous Ilya Hfufman M.D. 3.7 mL/hr at 11/01/18 1507 0.2 mg/kg/hr at 11/01/18 1507   • prasugrel (EFFIENT) tablet 60 mg  60 mg Oral Once Ilya Huffman M.D.   Stopped at 11/01/18 1600   • [START ON 11/2/2018] prasugrel (EFFIENT) tablet 10 mg  10 mg Oral DAILY Ilya Huffman M.D.           Allergies  No Known Allergies    Vital Signs last 24 hours  Temp:  [36.3 °C (97.4 °F)-36.6 °C (97.9 °F)] 36.6 °C (97.9 °F)  Pulse:  [] 78  Resp:  [10-29] 18  BP: (204-209)/(145-152) 204/152    Physical Exam  Physical Exam   Constitutional: He is oriented to person, place, and time. He appears well-developed and well-nourished.   Obese male no acute distress no diaphoresis   HENT:   Head: Normocephalic and atraumatic.   Eyes: Pupils are equal, round, and reactive to light.   Neck: Normal range of motion. Neck supple. No JVD present.   Cardiovascular: Normal rate and regular rhythm.    Equal radial and dorsal pedal pulses   Pulmonary/Chest: No respiratory distress. He has no wheezes. He has no rales.   Abdominal: He exhibits no distension. There is no tenderness. There is no rebound.   Musculoskeletal: He exhibits no edema or tenderness.   Neurological: He is alert and oriented to person, place, and time.   Skin: Skin is warm and dry.       Fluids    Intake/Output Summary (Last 24 hours) at 11/01/18 1638  Last data filed at 11/01/18 1600   Gross per 24 hour   Intake          1429.68 ml   Output              425 ml   Net          1004.68 ml       Laboratory  Recent  Results (from the past 48 hour(s))   EKG (NOW)    Collection Time: 18 12:14 AM   Result Value Ref Range    Report       [facility] Emergency Dept.    Test Date:  2018  Pt Name:    BRYCE CONNER         Department: ER  MRN:        [id number]                      Room:  Gender:     Male                         Technician: [id number]  :        1985                   Requested By:MALA TRIAGE PROTOCOL  Order #:    [id number]                    Reading MD:    Measurements  Intervals                                Axis  Rate:       108                          P:          46  KY:         132                          QRS:        20  QRSD:       94                           T:          172  QT:         376  QTc:        504    Interpretive Statements  SINUS TACHYCARDIA  RSR' IN V1 OR V2, PROBABLY NORMAL VARIANT  REPOL ABNRM SUGGESTS ISCHEMIA, DIFFUSE LEADS  PROLONGED QT INTERVAL  No previous ECG available for comparison     EKG (NOW)    Collection Time: 18 12:24 AM   Result Value Ref Range    Report       [facility] Emergency Dept.    Test Date:  2018  Pt Name:    BRYCE CONNER         Department: ER  MRN:        [id number]                      Room:       RD 03  Gender:     Male                         Technician: [id number]  :        1985                   Requested By:JACKSON BOYKIN  Order #:    [id number]                    Reading MD:    Measurements  Intervals                                Axis  Rate:       110                          P:          56  KY:         132                          QRS:        2  QRSD:       96                           T:          166  QT:         360  QTc:        488    Interpretive Statements  SINUS TACHYCARDIA  REPOL ABNRM SUGGESTS ISCHEMIA, DIFFUSE LEADS  BORDERLINE PROLONGED QT INTERVAL  Compared to ECG 2018 00:14:02  No significant changes     Troponin    Collection Time: 18 12:32 AM   Result Value  Ref Range    Troponin I 0.54 (H) 0.00 - 0.04 ng/mL   Btype Natriuretic Peptide    Collection Time: 11/01/18 12:32 AM   Result Value Ref Range    B Natriuretic Peptide 234 (H) 0 - 100 pg/mL   CBC with Differential    Collection Time: 11/01/18 12:32 AM   Result Value Ref Range    WBC 15.0 (H) 4.8 - 10.8 K/uL    RBC 5.96 4.70 - 6.10 M/uL    Hemoglobin 16.9 14.0 - 18.0 g/dL    Hematocrit 50.2 42.0 - 52.0 %    MCV 84.2 81.4 - 97.8 fL    MCH 28.4 27.0 - 33.0 pg    MCHC 33.7 33.7 - 35.3 g/dL    RDW 38.4 35.9 - 50.0 fL    Platelet Count 427 164 - 446 K/uL    MPV 9.5 9.0 - 12.9 fL    Neutrophils-Polys 68.10 44.00 - 72.00 %    Lymphocytes 25.00 22.00 - 41.00 %    Monocytes 4.70 0.00 - 13.40 %    Eosinophils 1.10 0.00 - 6.90 %    Basophils 0.70 0.00 - 1.80 %    Immature Granulocytes 0.40 0.00 - 0.90 %    Nucleated RBC 0.00 /100 WBC    Neutrophils (Absolute) 10.18 (H) 1.82 - 7.42 K/uL    Lymphs (Absolute) 3.74 1.00 - 4.80 K/uL    Monos (Absolute) 0.70 0.00 - 0.85 K/uL    Eos (Absolute) 0.17 0.00 - 0.51 K/uL    Baso (Absolute) 0.10 0.00 - 0.12 K/uL    Immature Granulocytes (abs) 0.06 0.00 - 0.11 K/uL    NRBC (Absolute) 0.00 K/uL   Complete Metabolic Panel (CMP)    Collection Time: 11/01/18 12:32 AM   Result Value Ref Range    Sodium 139 135 - 145 mmol/L    Potassium 4.0 3.6 - 5.5 mmol/L    Chloride 104 96 - 112 mmol/L    Co2 24 20 - 33 mmol/L    Anion Gap 11.0 0.0 - 11.9    Glucose 157 (H) 65 - 99 mg/dL    Bun 17 8 - 22 mg/dL    Creatinine 1.08 0.50 - 1.40 mg/dL    Calcium 9.5 8.5 - 10.5 mg/dL    AST(SGOT) 19 12 - 45 U/L    ALT(SGPT) 19 2 - 50 U/L    Alkaline Phosphatase 60 30 - 99 U/L    Total Bilirubin 0.6 0.1 - 1.5 mg/dL    Albumin 4.3 3.2 - 4.9 g/dL    Total Protein 7.4 6.0 - 8.2 g/dL    Globulin 3.1 1.9 - 3.5 g/dL    A-G Ratio 1.4 g/dL   Prothrombin Time    Collection Time: 11/01/18 12:32 AM   Result Value Ref Range    PT 13.8 12.0 - 14.6 sec    INR 1.05 0.87 - 1.13   APTT    Collection Time: 11/01/18 12:32 AM   Result  Value Ref Range    APTT 30.7 24.7 - 36.0 sec   Lipase    Collection Time: 11/01/18 12:32 AM   Result Value Ref Range    Lipase 4 (L) 11 - 82 U/L   D-DIMER    Collection Time: 11/01/18 12:32 AM   Result Value Ref Range    D-Dimer Screen <0.40 0.00 - 0.50 ug/mL (FEU)   ESTIMATED GFR    Collection Time: 11/01/18 12:32 AM   Result Value Ref Range    GFR If African American >60 >60 mL/min/1.73 m 2    GFR If Non African American >60 >60 mL/min/1.73 m 2   Hemoglobin A1c    Collection Time: 11/01/18 12:32 AM   Result Value Ref Range    Glycohemoglobin 7.8 (H) 0.0 - 5.6 %    Est Avg Glucose 177 mg/dL   Troponin - STAT Once    Collection Time: 11/01/18  4:20 AM   Result Value Ref Range    Troponin I 1.61 (H) 0.00 - 0.04 ng/mL   Troponin in four (4) hours    Collection Time: 11/01/18  8:20 AM   Result Value Ref Range    Troponin I 9.59 (H) 0.00 - 0.04 ng/mL   MAGNESIUM    Collection Time: 11/01/18  8:20 AM   Result Value Ref Range    Magnesium 1.9 1.5 - 2.5 mg/dL       Imaging  DX-CHEST-LIMITED (1 VIEW)   Final Result         1.  No acute cardiopulmonary disease.      EC-ECHOCARDIOGRAM COMPLETE W/O CONT    (Results Pending)       Assessment/Plan  Coronary artery disease involving native coronary artery of native heart with angina pectoris (HCC)   Assessment & Plan    Patient with 2 prior stents off all medication    Restart aspirin Plavix heparin for NSTEMI and potential concern for in-stent thrombosis    Cardiology consulted    Start beta-blockade        Class 1 obesity due to excess calories without serious comorbidity with body mass index (BMI) of 34.0 to 34.9 in adult   Assessment & Plan    Recommend diet modification        Type 2 diabetes mellitus with hyperglycemia, without long-term current use of insulin ([facility])   Assessment & Plan    Insulin sliding scale glycohemoglobin globin 7.8    Continue to monitor in the acute stress state of critical illness goal blood sugar below 180        Hypertensive emergency   Assessment  & Plan    Patient with significant elevated blood pressure off medications with chest pain EKG changes and positive troponin    Nitroglycerin drip reduce systolic blood pressure by 10% in the first hour and another 20-25% in the next 24 hours    Start home medications    Acute end organ damage cardiac with ischemia no signs of pulmonary edema    Check urine drug screen            Discussed patient condition and risk of morbidity and/or mortality with Hospitalist, RN, RT, Pharmacy, Charge nurse / hot rounds and Patient.    The patient remains critically ill acute hypertensive emergency and acute myocardial infarction requiring a heparin infusion and nitroglycerin infusion with active titration to control blood pressure and chest pain.  Critical care time = 45 minutes in directly providing and coordinating critical care and extensive data review.  No time overlap and excludes procedures.

## 2018-11-01 NOTE — ASSESSMENT & PLAN NOTE
Uncontrolled secondary to noncompliance  Currently on nitro drip  Will add metoprolol given non-STEMI  Monitor blood pressure and add ACE inhibitor if renal function remains stable

## 2018-11-01 NOTE — PROGRESS NOTES
Ree from Lab called with critical result of troponin at 0516. Critical lab result read back to Ree.   Dr. Alexis notified of critical lab result at 0553.  Critical lab result read back by Dr. Alexis.

## 2018-11-01 NOTE — ASSESSMENT & PLAN NOTE
Concern for in-stent thrombosis given noncompliance    Will start patient on heparin drip with bolus  We will start him on aspirin atorvastatin and metoprolol  He would likely need coronary angiography will consult cardiology discussed with Dr. Thompson  We will check echocardiogram

## 2018-11-01 NOTE — PROGRESS NOTES
Transported to Cath lab holding area with ELIZA RN and transport tech. zoll connected. SR. Heparin and nitro infusions running.

## 2018-11-01 NOTE — ASSESSMENT & PLAN NOTE
Insulin sliding scale glycohemoglobin globin 7.8    Continue to monitor in the acute stress state of critical illness goal blood sugar below 180

## 2018-11-01 NOTE — ED PROVIDER NOTES
ED Provider Note    Scribed for Jones Doran M.D. by Caron Matthews. 11/1/2018, 12:30 AM.    Primary care provider: Pcp Pt States None  Means of arrival: walk in   History obtained from: Patient  History limited by: none     CHIEF COMPLAINT  Chief Complaint   Patient presents with   • Chest Pain     since this am       HPI  Mina Burch is a 33 y.o. male who presents to the Emergency Department with complaints of acute diffuse chest pain described as tightness onset 3 days ago. He reports associated symptoms of shortness of breath, but denies nausea, vomiting, sweats, leg pain, leg swelling or any other symptoms. He states that his shortness of breath is exacerbated when he is lying down trying to sleep. He has been taking Aleve which alleviated his pain for the first 2 days, but did not provide any significant relief today. He states that he has never had chest pain similar in presentation in the past. The patient has a history of hypertension and notes that he has had two cardiac stents placed in the past (distal LAD and mid LAD). He used to take medication to manage his blood pressure, but has not been taking it recently due to recently moving to [place] and having insurance problems. He denies history of kidney problems. No other medical concerns reported at this time      REVIEW OF SYSTEMS  Pertinent positives include chest pain, shortness of breath . Pertinent negatives include nausea, vomiting, leg pain, leg swelling, sweats.   See HPI for further details. All other systems are negative.     PAST MEDICAL HISTORY   has a past medical history of Hypertension.    SURGICAL HISTORY  patient denies any surgical history    SOCIAL HISTORY  Social History   Substance Use Topics   • Smoking status: Current Every Day Smoker   • Smokeless tobacco: Never Used   • Alcohol use No      History   Drug Use No       FAMILY HISTORY  Family History   Problem Relation Age of Onset   • Heart Disease Father    •  "Hypertension Father        CURRENT MEDICATIONS  Reviewed.  See Encounter Summary.     ALLERGIES  No Known Allergies    PHYSICAL EXAM  VITAL SIGNS: BP (!) 204/152   Pulse (!) 106   Temp 36.3 °C (97.4 °F)   Resp 16   Ht 1.626 m (5' 4\")   Wt 92.3 kg (203 lb 7.8 oz)   SpO2 97%   BMI 34.93 kg/m²   Constitutional: Alert in no apparent distress.  HENT: No signs of trauma, Bilateral external ears normal, Nose normal.   Eyes: Pupils are equal and reactive, Conjunctiva normal, Non-icteric.   Neck: Normal range of motion, No tenderness, Supple, No stridor.   Lymphatic: No lymphadenopathy noted.   Cardiovascular: Regular rate and rhythm, no murmurs.   Thorax & Lungs: Normal breath sounds, No respiratory distress, No wheezing, No chest tenderness.   Abdomen: Bowel sounds normal, Soft, No tenderness, No masses, No pulsatile masses. No peritoneal signs.  Skin: Warm, Dry, No erythema, No rash.   Back: No bony tenderness, No CVA tenderness.   Extremities: Intact distal pulses, No edema, No tenderness, No cyanosis  Musculoskeletal: Good range of motion in all major joints. No tenderness to palpation or major deformities noted.   Neurologic: Alert , Normal motor function, Normal sensory function, No focal deficits noted.   Psychiatric: Affect normal, Judgment normal, Mood normal.     DIAGNOSTIC STUDIES / PROCEDURES     LABS  Results for orders placed or performed during the hospital encounter of 11/01/18   Troponin   Result Value Ref Range    Troponin I 0.54 (H) 0.00 - 0.04 ng/mL   Btype Natriuretic Peptide   Result Value Ref Range    B Natriuretic Peptide 234 (H) 0 - 100 pg/mL   CBC with Differential   Result Value Ref Range    WBC 15.0 (H) 4.8 - 10.8 K/uL    RBC 5.96 4.70 - 6.10 M/uL    Hemoglobin 16.9 14.0 - 18.0 g/dL    Hematocrit 50.2 42.0 - 52.0 %    MCV 84.2 81.4 - 97.8 fL    MCH 28.4 27.0 - 33.0 pg    MCHC 33.7 33.7 - 35.3 g/dL    RDW 38.4 35.9 - 50.0 fL    Platelet Count 427 164 - 446 K/uL    MPV 9.5 9.0 - 12.9 fL    " Neutrophils-Polys 68.10 44.00 - 72.00 %    Lymphocytes 25.00 22.00 - 41.00 %    Monocytes 4.70 0.00 - 13.40 %    Eosinophils 1.10 0.00 - 6.90 %    Basophils 0.70 0.00 - 1.80 %    Immature Granulocytes 0.40 0.00 - 0.90 %    Nucleated RBC 0.00 /100 WBC    Neutrophils (Absolute) 10.18 (H) 1.82 - 7.42 K/uL    Lymphs (Absolute) 3.74 1.00 - 4.80 K/uL    Monos (Absolute) 0.70 0.00 - 0.85 K/uL    Eos (Absolute) 0.17 0.00 - 0.51 K/uL    Baso (Absolute) 0.10 0.00 - 0.12 K/uL    Immature Granulocytes (abs) 0.06 0.00 - 0.11 K/uL    NRBC (Absolute) 0.00 K/uL   Complete Metabolic Panel (CMP)   Result Value Ref Range    Sodium 139 135 - 145 mmol/L    Potassium 4.0 3.6 - 5.5 mmol/L    Chloride 104 96 - 112 mmol/L    Co2 24 20 - 33 mmol/L    Anion Gap 11.0 0.0 - 11.9    Glucose 157 (H) 65 - 99 mg/dL    Bun 17 8 - 22 mg/dL    Creatinine 1.08 0.50 - 1.40 mg/dL    Calcium 9.5 8.5 - 10.5 mg/dL    AST(SGOT) 19 12 - 45 U/L    ALT(SGPT) 19 2 - 50 U/L    Alkaline Phosphatase 60 30 - 99 U/L    Total Bilirubin 0.6 0.1 - 1.5 mg/dL    Albumin 4.3 3.2 - 4.9 g/dL    Total Protein 7.4 6.0 - 8.2 g/dL    Globulin 3.1 1.9 - 3.5 g/dL    A-G Ratio 1.4 g/dL   Prothrombin Time   Result Value Ref Range    PT 13.8 12.0 - 14.6 sec    INR 1.05 0.87 - 1.13   APTT   Result Value Ref Range    APTT 30.7 24.7 - 36.0 sec   Lipase   Result Value Ref Range    Lipase 4 (L) 11 - 82 U/L   D-DIMER   Result Value Ref Range    D-Dimer Screen <0.40 0.00 - 0.50 ug/mL (FEU)   ESTIMATED GFR   Result Value Ref Range    GFR If African American >60 >60 mL/min/1.73 m 2    GFR If Non African American >60 >60 mL/min/1.73 m 2   EKG (NOW)   Result Value Ref Range    Report       [facility] Emergency Dept.    Test Date:  2018  Pt Name:    BRYCE CONNER         Department: ER  MRN:        [id number]                      Room:  Gender:     Male                         Technician: [id number]  :        1985                   Requested By:ER TRIAGE  PROTOCOL  Order #:    [id number]                    Reading MD:    Measurements  Intervals                                Axis  Rate:       108                          P:          46  PA:         132                          QRS:        20  QRSD:       94                           T:          172  QT:         376  QTc:        504    Interpretive Statements  SINUS TACHYCARDIA  RSR' IN V1 OR V2, PROBABLY NORMAL VARIANT  REPOL ABNRM SUGGESTS ISCHEMIA, DIFFUSE LEADS  PROLONGED QT INTERVAL  No previous ECG available for comparison     EKG (NOW)   Result Value Ref Range    Report       [facility] Emergency Dept.    Test Date:  2018  Pt Name:    BRYCE CONNER         Department: ER  MRN:        [id number]                      Room:       [facility]  Gender:     Male                         Technician: [id number]  :        1985                   Requested By:AJCKSON BOYKIN  Order #:    [id number]                    Reading MD:    Measurements  Intervals                                Axis  Rate:       110                          P:          56  PA:         132                          QRS:        2  QRSD:       96                           T:          166  QT:         360  QTc:        488    Interpretive Statements  SINUS TACHYCARDIA  REPOL ABNRM SUGGESTS ISCHEMIA, DIFFUSE LEADS  BORDERLINE PROLONGED QT INTERVAL  Compared to ECG 2018 00:14:02  No significant changes         All labs were reviewed by me.    EKG  12 Lead EKG interpreted by me to show:  Tachycardic rhythm  Rate 110  Axis: Leftward  Intervals:   T wave inversions with depressions in lateral leads, nonspecific inferior changes, ST depressions in 3, V 4, minimal ST elevation AVR  No prior EKG for comparison    EKG Interpretation:  Repeat EKG Interpreted by me    Rhythm:  Sinus tachycardia   Rate: 104  Axis: Leftward  Intervals:   T wave inversions with depressions in lateral leads, nonspecific inferior changes, ST  depressions in 3, V 4, minimal ST elevation AVR  Clinical Impression: Unchanged from prior EKG         RADIOLOGY  DX-CHEST-LIMITED (1 VIEW)   Final Result         1.  No acute cardiopulmonary disease.        The radiologist's interpretation of all radiological studies and images have been reviewed by me.    COURSE & MEDICAL DECISION MAKING  Pertinent Labs & Imaging studies reviewed. (See chart for details)      12:30 AM - Patient seen and examined at bedside. Patient will be treated with nitroglycerin 0.4 mg. Ordered DX chest, troponin, BNP, CBC with differential, CMP, prothrombin time, APTT, lipase, D dimer, estimated GFR and an EKG to evaluate his symptoms.     1:40 AM - I reviewed the patient's lab and imaging results thus far.     1:53 AM - I reevaluated patient at bedside and discussed diagnostic study results. I also discussed the plan for admission as outlined below.     2:11 AM -  I discussed the patient's case and the above findings with Dr. Alexis (hospitalist) who will accept the patient for admission.       Decision Making:  This is a 33 y.o. year old male who presents with chest pain and hypertension.  Patient notes that he has had 2 prior stents within the last year.  These were placed in [place].  He has moved to [place] to work for "[employer] within the last couple of months.  Due to lack of insurance he has not been compliant with his regular medications and not been using any over-the-counter medications including that of aspirin despite his stents.  He notes that he has had progressively worsening chest pain over the last couple days.  Additional dyspnea especially when lying flat.  Patient has been started on nitroglycerin given his hypertension and chest pain.  He does have some relief of discomfort with this.  Laboratory evaluation significant for elevated troponin.  EKG is certainly not normal but no prior comparisons at this facility.  I discussed the case with the hospital service which is  agreeable to ongoing inpatient cardiac evaluation.    DISPOSITION:  Patient will be admitted to Dr. Alexis in guarded condition.      FINAL IMPRESSION  1. Elevated troponin    2. Hypertension, unspecified type          I, Caron Matthews (Scribe), am scribing for, and in the presence of, Jones Doran M.D..    Electronically signed by: Caron Matthews (Scribe), 11/1/2018    I, Jones Doran M.D. personally performed the services described in this documentation, as scribed by Caron Matthews in my presence, and it is both accurate and complete.    C    The note accurately reflects work and decisions made by me.  Jones Doran  11/1/2018  6:14 AM

## 2018-11-01 NOTE — CONSULTS
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   Maximum troponins 9.59, uptrending.   EKG showed no ST elevation, but repolization abnormalities suggestive of ischemia.  The patient was admitted to the ICU for hypertension (-200).  Cardiology was consulted for NSTEMI.      Chest discomfort started 2 days prior, it has been pressure like in nature, located across his chest, associated with worsened shortness of breath and orthopnea.  No radiation, no nausea/vomiting. Chest pressure is not provoked by activity.  He regularly climbs stairs at work.   Chest pressure is non positional, not affected by respirations.   He has had no prior instances of angina pectoralis.      In 9/2017, the patient was living in [facility] when he experienced shortness of breath.  He went to the [facility] and was treated for acute asthma exacerbation.  EKG changes and troponin elevation prompted transport to [place] for NSTEMI.  While there he was cathed with two BELINDA placed in the LAD.  The patient describes following up with a cardiologist associated with [place].  He recalls being on aspirin 81mg and atorvastatin.  He does not recall is other medications.  Medical records from outside facility pending.  The patient smokes, 1/2 ppd for he past 18 years.  He has diabetes type 2 and hypertension.  Family history is positive for myocardial infarction in his father when he was 45 years old.      In the ED, Trop 0.54, .  CXR showed no acute processes.  EKG showed EKG showed no ST elevation, but repolization abnormalities suggestive of ischemia.    Interval events:   -11/1:  Patient presented to ED, admitted to ICU for urgent hypertension, cardiology consulted for NSTEMI.  Cardiac cath scheduled today for NSTEMI.   Patient seen bedside with continued chest pressure.  BP has been controlled with Catapres, metoprolol PO.  Therapeutic heparin started.  IV nitro given    MEDICATIONS:    Current Facility-Administered Medications:   •  nitroglycerin (NITROSTAT) tablet 0.4 mg, 0.4 mg, Sublingual, Q5 MIN PRN, Jones Doran M.D.  •  nitroglycerin 50 mg in D5W 250 ml infusion, 0-200 mcg/min, Intravenous, Continuous, Jatinder Alexis M.D., Last Rate: 33 mL/hr at 11/01/18 0955, 110 mcg/min at 11/01/18 0955  •  NS infusion, , Intravenous, Continuous, Jatinder Alexis M.D., Last Rate: 83 mL/hr at 11/01/18 0410  •  acetaminophen (TYLENOL) tablet 650 mg, 650 mg, Oral, Q6HRS PRN, Jatinder Alexis M.D.  •  cloNIDine (CATAPRES) tablet 0.1 mg, 0.1 mg, Oral, Q6HRS PRN, Jatinder Alexis M.D.  •  aspirin (ASA) tablet 325 mg, 325 mg, Oral, DAILY **OR** aspirin (ASA) chewable tab 324 mg, 324 mg, Oral, DAILY, 324 mg at 11/01/18 0344 **OR** aspirin (ASA) suppository 300 mg, 300 mg, Rectal, DAILY, Jatinder Alexis M.D.  •  ondansetron (ZOFRAN) syringe/vial injection 4 mg, 4 mg, Intravenous, Q4HRS PRN, Jatinder Alexis M.D.  •  ondansetron (ZOFRAN ODT) dispertab 4 mg, 4 mg, Oral, Q4HRS PRN, Jatinder Alexis M.D.  •  promethazine (PHENERGAN) tablet 12.5-25 mg, 12.5-25 mg, Oral, Q4HRS PRN, Jatinder Alexis M.D.  •  promethazine (PHENERGAN) suppository 12.5-25 mg, 12.5-25 mg, Rectal, Q4HRS PRN, Jatinder A. Lashmeet, M.D.  •  prochlorperazine (COMPAZINE) injection 5-10 mg, 5-10 mg, Intravenous, Q4HRS PRN, Jatinder Alexis M.D.  •  senna-docusate (PERICOLACE or SENOKOT S) 8.6-50 MG per tablet 2 Tab, 2 Tab, Oral, BID **AND** polyethylene glycol/lytes (MIRALAX) PACKET 1 Packet, 1 Packet, Oral, QDAY PRN **AND** magnesium hydroxide (MILK OF MAGNESIA) suspension 30 mL, 30 mL, Oral, QDAY PRN **AND** bisacodyl (DULCOLAX) suppository 10 mg, 10 mg, Rectal, QDAY PRN, Jatinder Alexis M.D.  •  metoprolol (LOPRESSOR) tablet 50 mg, 50 mg, Oral, TWICE DAILY, Iban Dunlap M.D., 50  mg at 11/01/18 0849  •  atorvastatin (LIPITOR) tablet 40 mg, 40 mg, Oral, QHS, Iban Dunlap M.D.  •  [COMPLETED] heparin injection 6,000 Units, 6,000 Units, Intravenous, Once, 6,000 Units at 11/01/18 0843 **AND** heparin injection 3,200 Units, 3,200 Units, Intravenous, PRN **AND** heparin infusion 25,000 units in 500 ml 0.45% nacl, , Intravenous, Continuous, Last Rate: 24 mL/hr at 11/01/18 0843, 1,200 Units/hr at 11/01/18 0843 **AND** Protocol 440 Heparin Weight Based DO NOT GIVE ANY HEPARIN BOLUS TO STROKE PATIENT, , , CONTINUOUS **AND** Protocol 440 Heparin Weight Based Discontinue Enoxaparin (Lovenox), Dabigatran (Pradaxa), Rivaroxaban (Xarelto), Apixaban (Eliquis), Edoxaban (Savaysa, Lixiana), Fondaparinux (Arixtra) and Argatroban prior to heparin administration, , , CONTINUOUS **AND** Protocol 440 Heparin Weight Based Draw baseline aPTT, PT, and platelet count if not already done, , , CONTINUOUS **AND** Protocol 440 Heparin Weight Based Draw aPTT 6 hours after beginning infusion. , , , CONTINUOUS **AND** Protocol 440 Heparin Weight Based Draw Platelet count every three days. Contact MD if platelet is 50% lower than baseline count., , , CONTINUOUS **AND** Protocol 440 Heparin Weight Based Record Patient Data, , , CONTINUOUS **AND** Protocol 440 Heparin Weight Based INSTRUCTIONS, , , CONTINUOUS **AND** Protocol 440 Heparin Weight Based Review aPTT results 6 hours after infusion is begun as detailed, , , CONTINUOUS **AND** Protocol 440 Heparin Weight Based Adjust heparin to maintain aPTT between 55-96 sec, , , CONTINUOUS **AND** Protocol 440 Heparin Weight Based Order aPTT 6 hours after any rate change or hold until aPTT is therapeutic (55-96 seconds), , , CONTINUOUS **AND** Protocol 440 Heparin Weight Based Documentation and verification, , , CONTINUOUS, Iban Dunlap M.D.    ALLERGIES:   Mr. Sp Burch  has No Known Allergies.     SURGERIES:  Mr. Sp Burch   has no past surgical history on  "file.     MEDICAL ILLNESSES:  Mr. Sp Burch   has a past medical history of Diabetes (HCC); Hypertension; and Myocardial infarct (HCC).     SOCIAL HISTORY:  Mr. Sp Burch   reports that he has been smoking.  He has never used smokeless tobacco. He reports that he does not drink alcohol or use drugs.     FAMILY HISTORY:  Mr.'s Sp Burch  family history includes Diabetes in his mother; Heart Disease in his father; Hypertension in his father.      ROS:    Constitutional: Patient has had no fevers or chills or fatigue. Patient has has no significant weight change.  Eyes: Patient had no visual changes or vision loss.  ENT: Patient denies any sore throat or allergic rhinitis.  Respiratory: Patient has had no cough or sputum production. Also, no hemoptysis.  Cardiovascular: As noted above.  GI: Patient denies any nausea, vomiting, or diarrhea. Patient has had no hematemesis or melena.  : Patient denies any urinary urgency, frequency, or dysuria. Patient has noted no hematuria.  Orthopedic: Patient has no particular problem with arthritis. Patient is able to walk and exercise without difficulty.  Neurologic: Patient has had no focal numbness or weakness.   Psychiatric: Patient denies any difficulty with anxiety or depression.  Endocrine: Patient denies any history of thyroid disorder or diabetes. Patient denies any heat or cold intolerance. In addition, patient denies any polydipsia or polyuria.  Hematologic: Patient has had no easy bruising or bleeding. Patient denies any history of anemia.  Skin: Patient denies any unusual rashes or skin lesions.  Allergic/immunologic: Patient denies any difficulty with hayfever or other environmental allergens. Patient denies any food allergies.       PHYSICAL EXAM:    BP (!) 204/152   Pulse 98   Temp 36.6 °C (97.9 °F)   Resp (!) 22   Ht 1.626 m (5' 4\")   Wt 92.4 kg (203 lb 11.3 oz)   SpO2 95%   BMI 34.97 kg/m²      General: in no acute distress.  HEENT: NC, AT, " PERRL, EOMI, lids and conjunctiva are clear, moist oral mucosa. No JVD  Respit: CTAB, no wheezing or rales  Cardiac: RRR, no murmurs, S2 S2.  Non tender chest.    Abdomen: soft, nontender, nondistended  Extremities: No clubbing, cyanosis, or edema is present.  Skin: warm  Neurologic: AOx3, No focal neurologic abnormality noted.    No results found for: CHOLSTRLTOT, LDL, HDL, TRIGLYCERIDE    Lab Results   Component Value Date/Time    SODIUM 139 11/01/2018 12:32 AM    POTASSIUM 4.0 11/01/2018 12:32 AM    CHLORIDE 104 11/01/2018 12:32 AM    CO2 24 11/01/2018 12:32 AM    GLUCOSE 157 (H) 11/01/2018 12:32 AM    BUN 17 11/01/2018 12:32 AM    CREATININE 1.08 11/01/2018 12:32 AM     Lab Results   Component Value Date/Time    ALKPHOSPHAT 60 11/01/2018 12:32 AM    ASTSGOT 19 11/01/2018 12:32 AM    ALTSGPT 19 11/01/2018 12:32 AM    TBILIRUBIN 0.6 11/01/2018 12:32 AM      Lab Results   Component Value Date/Time    BNPBTYPENAT 234 (H) 11/01/2018 12:32 AM       Patient Active Problem List    Diagnosis Date Noted   • Hypertensive emergency 11/01/2018   • Essential hypertension 11/01/2018   • Type 2 diabetes mellitus with hyperglycemia, without long-term current use of insulin ([facility]) 11/01/2018   • Class 1 obesity due to excess calories without serious comorbidity with body mass index (BMI) of 34.0 to 34.9 in adult 11/01/2018   • Coronary artery disease involving native coronary artery of native heart with angina pectoris ([facility]) 11/01/2018   • Non-STEMI (non-ST elevated myocardial infarction) ([facility]) 11/01/2018         ASSESSMENT & PLAN:  -NSTEMI   -Max trop 9.59   -Admitting EKG:  No ST elevation, repolarization abnormality that suggest ischemia   -Complains of chest pressure   -History of NSTEMI with BELINDA x2 in the LAD on 9/2017  -HTN emergency   - to 200 with NSTEMI   -History of hypertension  -History of Asthma   -At home on albuterol  -Diabetes type 2   -Patient working to improved diet and increase activity  level.       Plan:  -Outside medical records pending  -Cardiac angiogram ordered for NSTEMI  -Continue NSTEMI medications:  ASA, metoprolol, atorvastatin, therapeutic heparin, nitro  -Continue antihypertensives:  Catapres prn, metoprolol.    -Recommend monitor/replete electrolytes:  K, Mg  -Recommend lifestyle changes including exercise, healthy diet, cessation of tobacco, addition of a statin, regular follow up with a cardiologist.

## 2018-11-01 NOTE — CARE PLAN
Problem: Acute Care of the Cardiac Cath Patient  Goal: Post Procedure Optimal Outcome for the Cardiac Cath Patient    Intervention: EKG on return to unit  Order in  Intervention: Bedrest per order, log roll every 2 hours on unaffected side, do not bend leg or lift head  Radial approach performed, bedrest not indicated. right wrist immobility education done.   Intervention: Vital signs every 15 minutes X 4, then every 30 minutes X 4, then every hour X 4  Continuing trend per protocol  Intervention: Assess peripheral pulses every 15 minutes X 4  Pulses are currently being check every 15 minutes  Intervention: Assess for hematoma every 15 minutes X 4  Hematoma check q15m x4 in progress.   Intervention: PTT if on Heparin per order  Heparin stopped in cath lab  Intervention: ACT if sheath left in patient or patient on IIb IIIa per order  n/a  Intervention: Continue IV 0.45% NS per orders  NS ordered instead. Drip in progress  Intervention: Sheath removal per order.  After removal, VS every 15 minutes X 4, then every 30 minutes X 4, then every hour X 4  Already removed.   Intervention: Care and monitoring of Femstop or Hemoband per order  done  Intervention: Discontinue Alvarez 6 hours after lines pulled  n/a  Intervention: No Glucophage X 48 hours  Not ordered on MAR  Intervention: For radial approach: nitro patch 0.4 mg (placed in cath lab). Remove POD #1 in AM.  Not ordered or placed  Intervention: Medication management (Aspirin, Plavix, Statins) per ord  ASA given in AM, statin ordered for NOC  Intervention: Cardiac Diet  done  Intervention: Force fluids per order  done  Intervention: Post Cath Education  done

## 2018-11-01 NOTE — PROCEDURES
REFERRING PHYSICIAN: Dr. Thompson    PREOPERATIVE DIAGNOSIS:  1.  Non-STEMI, ongoing chest pain  2.  CAD status post LAD PCI for non-STEMI 1 year ago  3.  Medication noncompliance  4.  Diabetes mellitus  5.  Ongoing tobacco abuse  6.  Hypertensive emergency due to medical noncompliance    POSTOPERATIVE DIAGNOSIS:  1.  100% occluded proximal dominant left circumflex coronary artery, thrombotic  2.  Patent mid and distal LAD stents with diffuse nonobstructive CAD throughout  3.  60% tandem RCA stenoses with diffuse distal small vessel CAD  4.  LVEF 50-55% preintervention  5.  Successful PCI LCx (3.0 x 33 mm Synergy BELINDA posted to 3.25 mm), excellent result DANAE-3 flow      PROCEDURE PERFORMED:  Selective coronary angiography of the native vessels  Left heart catheterization  Left ventriculogram  PCI of left circumflex/OM BELINDA  Supervision of moderate sedation    DESCRIPTION OF PROCEDURE:  The risks and benefits of cardiac catheterization as well as the procedure itself, rationale and appropriateness were discussed with the patient today. Complications including but not limited to death, stroke, MI, urgent bypass surgery, contrast nephropathy, vascular complications, bleeding and infection were explained to the patient. The potential outcomes associated with the procedure (possible PCI, possible CABG, possible medical Rx only) were also discussed at length. The patient agreed to proceed.    The patient was transported to the catheterization laboratory in the fasting state. The right radial area and right groin were prepped and draped in the usual fashion. Right radial area was entered with a single through and through puncture under direct ultrasound guidance and a 6F glide sheath was placed. An intra-arterial cocktail of heparin, verapamil and nitroglycerine was administered. Over a wire, a 5F Le catheter was passed to the aortic root and used to engage the right and left coronaries without difficulty. Contrast was  administered and multiple images obtained. This catheter was then used to cross the aortic valve for LHC and contrast was administered at 8cc/s for 24cc's for left ventriculogram.     DESCRIPTION OF PCI:  The decision was made to intervene on the culprit coronary artery.  Bivalirudin bolus and infusion was initiated.  The diagnostic catheter was exchanged over a wire for an 6 Georgian EBU 3.5 guide seated appropriately. A BMW 0.014 floppy tip wire was navigated across the culprit stenosis with some difficulty and the assistance of a balloon for backup as well as a guidezilla guide extension catheter. A 2.5 x 12 mm compliant balloon was used to predilate the lesion. A 3.0 x 33 mm Synergy BELINDA was then positioned and deployed at nominal pressure. Following this a 3.25 x 20 mm noncompliant balloon was used to post dilate the stent to high pressures. There was an excellent angiographic result with DANAE-3 flow and no residual stenosis in the stented segment. All catheters and guidewires were removed and a TR band was applied to achieve patent hemostasis. Patient left the cath lab in stable condition.      Moderate sedation directly monitored by me during the case while supervising the administration of the sedation medication by an independent trained RN to assist in the monitoring of the patient's level of consciousness and physiological status. I, the supervising physician was present the entire time from beginning of medication administration until the end of the procedure from 1435 until 1505. For detailed administration records please see the moderate sedation documentation in the median tab.      FINDINGS:  I. HEMODYNAMICS:    Ao: 117/97 mmHg   LEDP: 31 mmHg   Gradient on LV pullback: No    II. LEFT VENTRICULOGRAM:   LVEF ROMERO PROJECTION: 50-55 %     III. CORONARY ANGIOGRAPHY:  Left Main: Large caliber bifurcating no CAD.  Left Anterior Descending: Moderate caliber giving rise to several moderate sized diagonals.   There is a widely patent stent in its proximal and distal portion.  There is diffuse nonobstructive disease throughout the artery.  The second diagonal is a small to moderate size vessel and is jailed by the previous he placed stent with an ostial stenosis of 70-80% and DANAE-3 flow.  Left Circumflex: 100% thrombotically occluded in its proximal portion and a moderate to large size vessel.  Postintervention there is 0% residual stenosis and DANAE-3 flow to a dominant system.  There is diffuse small vessel nearly obliterative disease in the distal bed.  Right Coronary: Small to moderate caliber nondominant or codominant with diffuse nonobstructive disease in its midportion and proximal 60% stenosis.  Does supply a codominant RPDA which is diffuse small vessel disease.    COMPLICATIONS: none apparent    CONCLUSIONS:  1.  100% occluded proximal dominant left circumflex coronary artery, thrombotic  2.  Patent mid and distal LAD stents with diffuse nonobstructive CAD throughout  3.  60% tandem RCA stenoses with diffuse distal small vessel CAD  4.  LVEF 50-55% preintervention  5.  Successful PCI LCx (3.0 x 33 mm Synergy BELINDA posted to 3.25 mm), excellent result DANAE-3 flow    RECOMMENDATIONS:  Medical therapy with the addition of dual antiplatelet therapy  Medical compliance  Smoking cessation  Recommend he begin taking ownership for his own health  Aggressive therapy for his hypertension

## 2018-11-01 NOTE — PROGRESS NOTES
Report received from NOC ANA leon. PT assessed. States there is pressure at mid left chest about a 6/10 but does not manifest as perceived pain at this time. /103 and nitro drip increased. 10 beats asymptomatic Vtach reported by NOC RN at shift change. Pt denies fluttering or lightheadedness at this time. Drips verified.

## 2018-11-01 NOTE — PROGRESS NOTES
Cath lab called to update RN transport is on their way. Pt instructed to take off any undergarments and jewelry for procedure. zoll attached to pt.

## 2018-11-01 NOTE — CARE PLAN
Problem: Acute Care of the Cardiac Cath Patient  Goal: Pre Procedure Optimal Outcome for the Cardiac Cath Patient    Intervention: Consider EKG if not done in last 24 hours per order  ekg done on arrival  Intervention: Consider Chest XRay, H&H, BMP, INR, PTT, Biomarkers if not done in last 5 days  CBC,CMP, coags drawn in AM.   Intervention: Call abnormal results (i.e. INR > 1.5, Creatinine > 1.5) then notify cath lab  Done, trops elevated.   Intervention: Consent for Procedure  Done downstairs in pre-cath  Intervention: Start IV 0.45% NS per orders  NS infusion ordered. Started.   Intervention: Alvarez Cath per orders  n/a  Intervention: Assess peripheral pulses  Done 2+ throughout, skin warm and dry.   Intervention: Notify Cardiologist of Iodine, contrast or shellfish allergy  n/a  Intervention: Ask male patients about Viagra use before giving Nitroglycerine  Not on viagra  Intervention: No Coumadin/Warfarin X 48 hours pre Cath  Not on.   Intervention: Aspirin/Plavix pre cath and every day post procedure per orders  Asa given upon admission  Intervention: Statins per order  done  Intervention: Reopro/Integrelin through intracath and post cath per or  angiomax to run 4 hours post cath.   Intervention: NPO prior to procedure  Done, sips with meds. BB given.

## 2018-11-01 NOTE — ED NOTES
Patient continues to rate chest pain 6/10. No acute distress. Denies further needs at this time. Refusing additional SL Nitro dose. Call bell within reach.

## 2018-11-01 NOTE — PROGRESS NOTES
[facility] Hospitalist Progress Note    Date of Service: 2018    Chief Complaint  33 y.o. male admitted 2018 with CP HTN, hx of 2 LAD stents in 10/2017 non compliant with meds for past 2 months      Interval Problem Update  Called by RN pt with CP and increasing toponin  -200  Nitro drip at 120  NS 83  Trop trending up      Consultants/Specialty  Card    Disposition  TBD        Review of Systems   Constitutional: Negative for fever and malaise/fatigue.   HENT: Negative for congestion, ear discharge and nosebleeds.    Eyes: Negative for blurred vision, pain, discharge and redness.   Respiratory: Negative for cough, hemoptysis, sputum production, shortness of breath and stridor.    Cardiovascular: Negative for chest pain, palpitations, orthopnea and leg swelling.   Gastrointestinal: Negative for abdominal pain, blood in stool, diarrhea, melena, nausea and vomiting.   Genitourinary: Negative for dysuria, flank pain and hematuria.   Musculoskeletal: Negative for back pain, falls, joint pain and neck pain.   Skin: Negative.    Neurological: Negative for speech change, focal weakness, seizures, loss of consciousness, weakness and headaches.   Psychiatric/Behavioral: Negative for hallucinations, memory loss and substance abuse. The patient is not nervous/anxious.    All other systems reviewed and are negative.     Physical Exam  Laboratory/Imaging   Hemodynamics  Temp (24hrs), Av.5 °C (97.7 °F), Min:36.3 °C (97.4 °F), Max:36.6 °C (97.9 °F)   Temperature: 36.6 °C (97.9 °F)  Pulse  Av.6  Min: 76  Max: 109 Heart Rate (Monitored): (!) 103  Blood Pressure: (!) 204/152, NIBP: (!) 169/117      Respiratory      Respiration: 12, Pulse Oximetry: 98 %             Fluids  No intake or output data in the 24 hours ending 18 0806    Nutrition  Orders Placed This Encounter   Procedures   • Diet NPO     Standing Status:   Standing     Number of Occurrences:   1     Order Specific Question:   Restrict to:      Answer:   Sips with Medications [3]     Physical Exam   Constitutional: He is oriented to person, place, and time. He appears well-developed.   HENT:   Head: Normocephalic and atraumatic.   Mouth/Throat: Oropharynx is clear and moist. No oropharyngeal exudate.   Eyes: Pupils are equal, round, and reactive to light. Conjunctivae are normal. Right eye exhibits no discharge. Left eye exhibits no discharge. No scleral icterus.   Neck: Neck supple. No JVD present. No tracheal deviation present.   Cardiovascular: Normal rate and regular rhythm.  Exam reveals no gallop and no friction rub.    No murmur heard.  Pulmonary/Chest: Effort normal and breath sounds normal. No stridor. No respiratory distress. He has no wheezes. He exhibits no tenderness.   Abdominal: Soft. Bowel sounds are normal. He exhibits no distension. There is no tenderness. There is no rebound.   Musculoskeletal: He exhibits no edema or tenderness.   Neurological: He is alert and oriented to person, place, and time. No cranial nerve deficit. He exhibits normal muscle tone.   Skin: Skin is warm and dry. He is not diaphoretic. No cyanosis. Nails show no clubbing.   Psychiatric: He has a normal mood and affect. His behavior is normal.   Nursing note and vitals reviewed.      Recent Labs      11/01/18 0032   WBC  15.0*   RBC  5.96   HEMOGLOBIN  16.9   HEMATOCRIT  50.2   MCV  84.2   MCH  28.4   MCHC  33.7   RDW  38.4   PLATELETCT  427   MPV  9.5     Recent Labs      11/01/18 0032   SODIUM  139   POTASSIUM  4.0   CHLORIDE  104   CO2  24   GLUCOSE  157*   BUN  17   CREATININE  1.08   CALCIUM  9.5     Recent Labs      11/01/18 0032   APTT  30.7   INR  1.05     Recent Labs      11/01/18 0032   BNPBTYPENAT  234*              Assessment/Plan     * Non-STEMI (non-ST elevated myocardial infarction) (HCC)- (present on admission)   Assessment & Plan    Concern for in-stent thrombosis given noncompliance    Will start patient on heparin drip with bolus  We will  start him on aspirin atorvastatin and metoprolol  He would likely need coronary angiography will consult cardiology discussed with Dr. Thompson  We will check echocardiogram        Ventricular tachycardia (HCC)   Assessment & Plan    Likely secondary to his non-STEMI  Nonsustained  Replete magnesium  Continue telemetry monitoring  Start metoprolol  Follow-up on echo        Coronary artery disease involving native coronary artery of native heart with angina pectoris (HCC)   Assessment & Plan    With stenting to LAD x2 in 2017 with noncompliance to medical therapy  Reinforced compliance with lifestyle changes in medical therapy  Asked RN to try to obtain records from previous coronary angiography and intervention        Class 1 obesity due to excess calories without serious comorbidity with body mass index (BMI) of 34.0 to 34.9 in adult   Assessment & Plan    Body mass index is 34.93 kg/m².          Type 2 diabetes mellitus with hyperglycemia, without long-term current use of insulin (HCC)   Assessment & Plan    Uncontrolled with hyperglycemia    Insulin sliding scale and monitor CBGs        Essential hypertension   Assessment & Plan    Uncontrolled secondary to noncompliance  Currently on nitro drip  Will add metoprolol given non-STEMI  Monitor blood pressure and add ACE inhibitor if renal function remains stable        Hypertensive emergency   Assessment & Plan    Treatment per above          Quality-Core Measures   Reviewed items::  Labs reviewed, Medications reviewed, Radiology images reviewed and EKG reviewed  Alvarez catheter::  No Alvarez  DVT prophylaxis pharmacological::  Heparin      >Patient is critically ill.   >The patient is having : And STEMI with continued chest pain  >The vital organ system that is effected is the: Cardiovascular  >If untreated there is a high chance of deterioration into: Death  >The critical care that I am providing today is: Nitro and heparin drips and medical management  >The  critical care that has been undertaken is medically complex.   >There has been no overlap in critical care time.   Critical care time not including procedures, no overlap: 32 minutes

## 2018-11-01 NOTE — PROGRESS NOTES
Cath lab called for report. Consents to be signed downstairs. karin at bedside for transport. Post-procedure education provided including potential bedrest and hematoma monitoring. All questions answered.

## 2018-11-01 NOTE — H&P
Hospital Medicine History & Physical Note    Date of Service  11/1/2018    Primary Care Physician  Pcp Pt States None    Consultants  None    Code Status  Full code     Chief Complaint  Chest pain    History of Presenting Illness  33 y.o. male with history of coronary artery disease, diabetes mellitus, and essential hypertension, for which she he is not taking any medications due to insurance difficulty was in his usual state of health until the day prior to admission.  He reports while lying in bed, having the sudden onset of chest pressure spreading across his entire chest with no radiation to his neck or arms.  He reports no exacerbating or alleviating factors.  He was subsequently brought to the emergency department for further evaluation.  He denies associated shortness of breath he does have some anxiety associated with the pain.  No other complaints.    Review of Systems  Review of Systems   Constitutional: Negative.    HENT: Negative.    Eyes: Negative.    Respiratory: Negative.    Cardiovascular: Positive for chest pain.   Gastrointestinal: Negative.    Genitourinary: Negative.    Musculoskeletal: Negative.    Skin: Negative.    Neurological: Negative.    Endo/Heme/Allergies: Negative.    Psychiatric/Behavioral: The patient is nervous/anxious.        Past Medical History   has a past medical history of Diabetes ([facility]); Hypertension; and Myocardial infarct ([facility]).    Surgical History   has no past surgical history on file.     Family History  family history includes Diabetes in his mother; Heart Disease in his father; Hypertension in his father.     Social History   reports that he has been smoking.  He has never used smokeless tobacco. He reports that he does not drink alcohol or use drugs.    Allergies  No Known Allergies    Medications  Prior to Admission Medications   Prescriptions Last Dose Informant Patient Reported? Taking?   predniSONE (DELTASONE) 20 MG Tab   No No   Sig: 3 tablets oral daily for 4  days followed by 2 tablets oral daily for 4 days followed by 1 tablet oral daily for 4 days      Facility-Administered Medications: None       Physical Exam  Temp:  [36.3 °C (97.4 °F)] 36.3 °C (97.4 °F)  Pulse:  [] 76  Resp:  [13-20] 19  BP: (204-209)/(145-152) 204/152    Physical Exam   Constitutional: He is oriented to person, place, and time. He appears well-developed and well-nourished. No distress.   HENT:   Head: Normocephalic and atraumatic.   Eyes: Pupils are equal, round, and reactive to light. Conjunctivae are normal.   Neck: Normal range of motion. Neck supple. No tracheal deviation present. No thyromegaly present.   Cardiovascular: Normal rate, regular rhythm and normal heart sounds.  Exam reveals no gallop and no friction rub.    No murmur heard.  Pulmonary/Chest: Effort normal and breath sounds normal. No respiratory distress. He has no wheezes.   Abdominal: Soft. Bowel sounds are normal. He exhibits no distension and no mass. There is no tenderness. There is no rebound and no guarding.   Musculoskeletal: Normal range of motion. He exhibits no edema.   Lymphadenopathy:     He has no cervical adenopathy.   Neurological: He is alert and oriented to person, place, and time. No cranial nerve deficit.   Skin: Skin is warm and dry. He is not diaphoretic.   Psychiatric: He has a normal mood and affect.       Laboratory:  Recent Labs      11/01/18 0032   WBC  15.0*   RBC  5.96   HEMOGLOBIN  16.9   HEMATOCRIT  50.2   MCV  84.2   MCH  28.4   MCHC  33.7   RDW  38.4   PLATELETCT  427   MPV  9.5     Recent Labs      11/01/18 0032   SODIUM  139   POTASSIUM  4.0   CHLORIDE  104   CO2  24   GLUCOSE  157*   BUN  17   CREATININE  1.08   CALCIUM  9.5     Recent Labs      11/01/18 0032   ALTSGPT  19   ASTSGOT  19   ALKPHOSPHAT  60   TBILIRUBIN  0.6   LIPASE  4*   GLUCOSE  157*     Recent Labs      11/01/18 0032   APTT  30.7   INR  1.05     Recent Labs      11/01/18 0032   BNPBTYPENAT  234*         Recent  Labs      11/01/18   0032   TROPONINI  0.54*       Urinalysis:    No results found     Imaging:  DX-CHEST-LIMITED (1 VIEW)   Final Result         1.  No acute cardiopulmonary disease.            Assessment/Plan:  I anticipate this patient will require at least two midnights for appropriate medical management, necessitating inpatient admission.    * Hypertensive emergency   Assessment & Plan    With chest pain.  Plan for nitroglycerin infusion in order to lower blood pressure approximately 10% over next several hours.  Monitor.         Coronary artery disease involving native coronary artery of native heart with angina pectoris (HCC)   Assessment & Plan    Not currently on medication regimen due to insurance issue.          Class 1 obesity due to excess calories without serious comorbidity with body mass index (BMI) of 34.0 to 34.9 in adult   Assessment & Plan    Body mass index is 34.93 kg/m².          Type 2 diabetes mellitus with hyperglycemia, without long-term current use of insulin (HCC)   Assessment & Plan    Monitor with insulin therapy.  Non-compliant with treatment due to insurance        Essential hypertension   Assessment & Plan    Uncontrolled with medication non-compliance in setting of insurance.             VTE prophylaxis: SCD, lovenox

## 2018-11-01 NOTE — ED NOTES
Straight back to red 3, ERP to bedside.  Pt has concerning EKG findings.  Protocol initiated.  2 IVs established.  Blood sent to lab

## 2018-11-01 NOTE — ED NOTES
Report given at bedside to CIC RN Himanshu. Patient transported to ICU via gurney with vitals monitor in place accompanied by ICU RN x 2. All belongings accounted for.

## 2018-11-01 NOTE — PROGRESS NOTES
Previous STEMI treatment done at [facility] in [place], [place]. Pt information given to unit clerk to request medical records.

## 2018-11-01 NOTE — PROGRESS NOTES
Dr. Margaret Dunlap updated on pt status and need for cardiology consult at this time. Ectopy strip reviewed with MD.

## 2018-11-01 NOTE — ED NOTES
Patient states that he has stents x 2 for previous MI - placed in [place], [place], last about 1 year ago.    Patient states that he has not been taking his prescribed medication for about 2 months because his MediCal ran out and his insurance from work has not yet kicked in.

## 2018-11-01 NOTE — PROGRESS NOTES
Back on floor from cath lab. A+Ox4, denies pain. VSS. hemoband on right wrist. No hematoma noted.

## 2018-11-01 NOTE — ASSESSMENT & PLAN NOTE
Patient with significant elevated blood pressure off medications with chest pain EKG changes and positive troponin    Nitroglycerin drip reduce systolic blood pressure by 10% in the first hour and another 20-25% in the next 24 hours    Well controlled off nitroglycerin drip    Continue beta-blocker and ACE

## 2018-11-02 ENCOUNTER — PATIENT OUTREACH (OUTPATIENT)
Dept: HEALTH INFORMATION MANAGEMENT | Facility: OTHER | Age: 33
End: 2018-11-02

## 2018-11-02 VITALS
HEART RATE: 78 BPM | DIASTOLIC BLOOD PRESSURE: 152 MMHG | OXYGEN SATURATION: 95 % | WEIGHT: 203.71 LBS | SYSTOLIC BLOOD PRESSURE: 204 MMHG | RESPIRATION RATE: 12 BRPM | BODY MASS INDEX: 34.78 KG/M2 | TEMPERATURE: 97.8 F | HEIGHT: 64 IN

## 2018-11-02 PROBLEM — I47.20 VENTRICULAR TACHYCARDIA (HCC): Status: RESOLVED | Noted: 2018-11-01 | Resolved: 2018-11-02

## 2018-11-02 PROBLEM — I25.5 ISCHEMIC CARDIOMYOPATHY: Status: ACTIVE | Noted: 2018-11-02

## 2018-11-02 LAB
ANION GAP SERPL CALC-SCNC: 8 MMOL/L (ref 0–11.9)
BASOPHILS # BLD AUTO: 0.6 % (ref 0–1.8)
BASOPHILS # BLD: 0.08 K/UL (ref 0–0.12)
BUN SERPL-MCNC: 16 MG/DL (ref 8–22)
CALCIUM SERPL-MCNC: 8.6 MG/DL (ref 8.5–10.5)
CHLORIDE SERPL-SCNC: 106 MMOL/L (ref 96–112)
CO2 SERPL-SCNC: 25 MMOL/L (ref 20–33)
CREAT SERPL-MCNC: 1.1 MG/DL (ref 0.5–1.4)
EKG IMPRESSION: NORMAL
EOSINOPHIL # BLD AUTO: 0.14 K/UL (ref 0–0.51)
EOSINOPHIL NFR BLD: 1 % (ref 0–6.9)
ERYTHROCYTE [DISTWIDTH] IN BLOOD BY AUTOMATED COUNT: 39.6 FL (ref 35.9–50)
GLUCOSE SERPL-MCNC: 137 MG/DL (ref 65–99)
HCT VFR BLD AUTO: 42.6 % (ref 42–52)
HGB BLD-MCNC: 14.1 G/DL (ref 14–18)
IMM GRANULOCYTES # BLD AUTO: 0.07 K/UL (ref 0–0.11)
IMM GRANULOCYTES NFR BLD AUTO: 0.5 % (ref 0–0.9)
LV EJECT FRACT  99904: 25
LV EJECT FRACT MOD 2C 99903: 34.91
LV EJECT FRACT MOD 4C 99902: 30.41
LV EJECT FRACT MOD BP 99901: 30.37
LYMPHOCYTES # BLD AUTO: 3.67 K/UL (ref 1–4.8)
LYMPHOCYTES NFR BLD: 26 % (ref 22–41)
MCH RBC QN AUTO: 28.4 PG (ref 27–33)
MCHC RBC AUTO-ENTMCNC: 33.1 G/DL (ref 33.7–35.3)
MCV RBC AUTO: 85.9 FL (ref 81.4–97.8)
MONOCYTES # BLD AUTO: 0.96 K/UL (ref 0–0.85)
MONOCYTES NFR BLD AUTO: 6.8 % (ref 0–13.4)
NEUTROPHILS # BLD AUTO: 9.19 K/UL (ref 1.82–7.42)
NEUTROPHILS NFR BLD: 65.1 % (ref 44–72)
NRBC # BLD AUTO: 0 K/UL
NRBC BLD-RTO: 0 /100 WBC
PLATELET # BLD AUTO: 305 K/UL (ref 164–446)
PMV BLD AUTO: 9.6 FL (ref 9–12.9)
POTASSIUM SERPL-SCNC: 4 MMOL/L (ref 3.6–5.5)
RBC # BLD AUTO: 4.96 M/UL (ref 4.7–6.1)
SODIUM SERPL-SCNC: 139 MMOL/L (ref 135–145)
WBC # BLD AUTO: 14.1 K/UL (ref 4.8–10.8)

## 2018-11-02 PROCEDURE — 99232 SBSQ HOSP IP/OBS MODERATE 35: CPT | Mod: GC | Performed by: INTERNAL MEDICINE

## 2018-11-02 PROCEDURE — 700102 HCHG RX REV CODE 250 W/ 637 OVERRIDE(OP): Performed by: HOSPITALIST

## 2018-11-02 PROCEDURE — 99233 SBSQ HOSP IP/OBS HIGH 50: CPT | Performed by: INTERNAL MEDICINE

## 2018-11-02 PROCEDURE — 99239 HOSP IP/OBS DSCHRG MGMT >30: CPT | Performed by: HOSPITALIST

## 2018-11-02 PROCEDURE — 700102 HCHG RX REV CODE 250 W/ 637 OVERRIDE(OP): Performed by: INTERNAL MEDICINE

## 2018-11-02 PROCEDURE — A9270 NON-COVERED ITEM OR SERVICE: HCPCS | Performed by: HOSPITALIST

## 2018-11-02 PROCEDURE — 85025 COMPLETE CBC W/AUTO DIFF WBC: CPT

## 2018-11-02 PROCEDURE — 80048 BASIC METABOLIC PNL TOTAL CA: CPT

## 2018-11-02 PROCEDURE — A9270 NON-COVERED ITEM OR SERVICE: HCPCS | Performed by: INTERNAL MEDICINE

## 2018-11-02 RX ORDER — NITROGLYCERIN 0.4 MG/1
0.4 TABLET SUBLINGUAL PRN
Qty: 25 TAB | Refills: 0 | Status: SHIPPED | OUTPATIENT
Start: 2018-11-02 | End: 2018-12-08 | Stop reason: CLARIF

## 2018-11-02 RX ORDER — LISINOPRIL 10 MG/1
10 TABLET ORAL DAILY
Qty: 30 TAB | Refills: 0 | Status: SHIPPED | OUTPATIENT
Start: 2018-11-03 | End: 2018-12-08 | Stop reason: CLARIF

## 2018-11-02 RX ORDER — ASPIRIN 81 MG/1
81 TABLET ORAL DAILY
Qty: 30 TAB | COMMUNITY
Start: 2018-11-03 | End: 2019-06-17

## 2018-11-02 RX ORDER — LISINOPRIL 10 MG/1
10 TABLET ORAL
Status: DISCONTINUED | OUTPATIENT
Start: 2018-11-02 | End: 2018-11-02 | Stop reason: HOSPADM

## 2018-11-02 RX ORDER — METOPROLOL TARTRATE 50 MG/1
50 TABLET, FILM COATED ORAL 2 TIMES DAILY
Qty: 60 TAB | Refills: 1 | Status: SHIPPED | OUTPATIENT
Start: 2018-11-02 | End: 2018-12-07 | Stop reason: CLARIF

## 2018-11-02 RX ORDER — PRASUGREL 10 MG/1
10 TABLET, FILM COATED ORAL DAILY
Qty: 30 TAB | Refills: 0 | Status: SHIPPED | OUTPATIENT
Start: 2018-11-03 | End: 2018-11-02

## 2018-11-02 RX ORDER — PRASUGREL 10 MG/1
10 TABLET, FILM COATED ORAL DAILY
Qty: 30 TAB | Refills: 0 | Status: SHIPPED | OUTPATIENT
Start: 2018-11-03 | End: 2018-12-08 | Stop reason: CLARIF

## 2018-11-02 RX ORDER — ATORVASTATIN CALCIUM 40 MG/1
40 TABLET, FILM COATED ORAL
Qty: 30 TAB | Refills: 1 | Status: SHIPPED | OUTPATIENT
Start: 2018-11-02 | End: 2018-12-07 | Stop reason: CLARIF

## 2018-11-02 RX ADMIN — LISINOPRIL 10 MG: 10 TABLET ORAL at 08:35

## 2018-11-02 RX ADMIN — PRASUGREL 10 MG: 10 TABLET, FILM COATED ORAL at 04:45

## 2018-11-02 RX ADMIN — STANDARDIZED SENNA CONCENTRATE AND DOCUSATE SODIUM 2 TABLET: 8.6; 5 TABLET, FILM COATED ORAL at 04:46

## 2018-11-02 RX ADMIN — METOPROLOL TARTRATE 50 MG: 50 TABLET ORAL at 04:45

## 2018-11-02 RX ADMIN — ASPIRIN 81 MG: 81 TABLET, COATED ORAL at 04:45

## 2018-11-02 ASSESSMENT — ENCOUNTER SYMPTOMS
NAUSEA: 0
ABDOMINAL PAIN: 0
VOMITING: 0
PND: 0
HEADACHES: 0

## 2018-11-02 ASSESSMENT — PAIN SCALES - GENERAL
PAINLEVEL_OUTOF10: 0

## 2018-11-02 NOTE — CARE PLAN
Problem: Discharge Barriers/Planning  Goal: Patient's continuum of care needs will be met  Outcome: PROGRESSING AS EXPECTED  Spoke with pt for a long time (30 minutes or so) about medical condition. He is committed to smoking cessation.When asked about what led to this hospitalization, in order to assess for non-coplience, pt explined that he moved to [place] 3 month ago to work for [employer]. He is still on his probation period with them, & they do not provide medical insurance until employees pass probation. He has had madiCal before moving here, and is unsure about applying for state insurance in [place]. Told him our S/W can help. Will make sure AM nurse get Jessica involved with d/c  Pt had a lot of anxiety and is passing around the room, feel more relaxed after speaking about plan for the future.        Problem: Psychosocial Needs:  Goal: Level of anxiety will decrease  Outcome: PROGRESSING AS EXPECTED  Assessed pt for anxity triggers as well as coping strategies.  Provided therapeutic communication and relaxation techniques. Collaborated with IDT to get pt anti-anxiety medication. Pt is now resting comfortably in bed, did not meed medication. Md Solano asked to start with a small dose of Xanax and go up to 1mg q 6 prn

## 2018-11-02 NOTE — DISCHARGE PLANNING
Care Transition Team Discharge Planning     11/2/18  Patient is discharging today and in need of medication. Patient is uninsured and cannot afford expeisive medications. Patient to be discharged on 3 medications on [facility] [employer] discount prescription program. Patient states he can afford this. Medication Effient 10 mg daily quantity 30 under approved services through [facility] for total cost of $14.85. Provided patient with information on obtaining Effient from [facility] and other medications from [facility]. Also provided patient with discount programs for Effient through [facility]. Also provided patient information on [facility]. No further needs identified.     Discharge Plan:  Home with OP follow up

## 2018-11-02 NOTE — PROGRESS NOTES
Received report from ANA Muniz and assumed care of pt. VSS.  Went over plan of care with pt and answered any questions. Verified lines and ensured patency, drips are set at the correct rate, angiomax stopped (See MAR). Safety measurers implemented. Call light and personal belonging within reach, pt not a fall risk and up and meenakshi.   Spoke with pt for a long time (30 minutes or so) about medical condition. He is committed to smoking cessation.When asked about what led to this hospitalization, in order to assess for non-coplience, pt explined that he moved to [place] 3 month ago to work for [employer]. He is still on his probation period with them, & they do not provide medical insurance until employees pass probation. He has had madiCal before moving here, and is unsure about applying for state insurance in [place]. Told him our S/W can help.   Pt had a lot of anxiety and is passing around the room, feel more relaxed after speaking about plan for the future.

## 2018-11-02 NOTE — DISCHARGE SUMMARY
Discharge Summary    CHIEF COMPLAINT ON ADMISSION  Chief Complaint   Patient presents with   • Chest Pain     since this am       Reason for Admission  Chest Pain     Admission Date  11/1/2018    CODE STATUS  Full Code    HPI & HOSPITAL COURSE  This is a 33 y.o. male here with non-STEMI and hypertensive urgency.  Patient has history of coronary artery disease with prior stenting to LAD x2 in 2017 he recently moved to [place] for a new job ran out of insurance and has not been compliant with his medications for the past 2 months.  He presented to the emergency room with chest pain and elevated blood pressure.  His troponin was elevated and he had ST depressions in his lateral leads on his EKG he was started on heparin and nitro drips as per metoprolol and taken to the Cath Lab where he was noted to have 100% occluded proximal left circumflex which was successfully treated and had a drug-eluting stent with excellent result.  He was monitored in the ICU after the procedure he has been chest pain-free his blood pressure is better controlled.  He was evaluated by Dr. Thompson today and cleared from his standpoint for discharge.  Discussed with  today who will be assisting with his Effient prescription.  His echocardiogram revealed ejection fraction of 25%. I had a long discussion with the patient regarding the importance of complying with his prescribed medical therapy and lifestyle changes.  I discussed with our  to assist him with establishing with a local primary care provider.       Therefore, he is discharged in good and stable condition to home with close outpatient follow-up.    The patient recovered much more quickly than anticipated on admission.    Discharge Date  11/02/2018    FOLLOW UP ITEMS POST DISCHARGE  Follow-up with cardiology and primary care    DISCHARGE DIAGNOSES  Principal Problem:    Non-STEMI (non-ST elevated myocardial infarction) (HCC) POA: Yes  Active Problems:     Hypertensive emergency POA: Unknown    Essential hypertension POA: Unknown    Type 2 diabetes mellitus with hyperglycemia, without long-term current use of insulin (HCC) POA: Unknown    Class 1 obesity due to excess calories without serious comorbidity with body mass index (BMI) of 34.0 to 34.9 in adult POA: Unknown    Coronary artery disease involving native coronary artery of native heart with angina pectoris (HCC) POA: Unknown    Ischemic cardiomyopathy POA: Unknown  Resolved Problems:    STEMI (ST elevation myocardial infarction) (HCC) POA: Unknown    Ventricular tachycardia (HCC) POA: Unknown      FOLLOW UP  Future Appointments  Date Time Provider Department Center   11/20/2018 10:40 AM JANET Arvizu [facility] None     [facility]  [address]  [address]  [facility] [address] [phone]  Schedule an appointment as soon as possible for a visit  Your doctor has referred you to cardiac rehab, which is important for your recovery.  Please call to make an appointment    [address] [address] [phone]  Schedule an appointment as soon as possible for a visit  To establish with a primary care provider. This office offers discounts to patients who do not have insurance.       MEDICATIONS ON DISCHARGE     Medication List      START taking these medications      Instructions   aspirin 81 MG EC tablet   Take 1 Tab by mouth every day.  Dose:  81 mg     atorvastatin 40 MG Tabs  Commonly known as:  LIPITOR   Take 1 Tab by mouth every bedtime.  Dose:  40 mg     lisinopril 10 MG Tabs  Commonly known as:  PRINIVIL   Take 1 Tab by mouth every day.  Dose:  10 mg     metFORMIN 500 MG Tabs  Commonly known as:  GLUCOPHAGE   Take 1 Tab by mouth 2 times a day, with meals.  Dose:  500 mg     metoprolol 50 MG Tabs  Commonly known as:  LOPRESSOR   Take 1 Tab by mouth 2 Times a Day.  Dose:  50 mg     nitroglycerin 0.4 MG Subl  Commonly known as:   NITROSTAT   Place 1 Tab under tongue as needed for Chest Pain.  Dose:  0.4 mg     prasugrel 10 MG Tabs  Commonly known as:  EFFIENT   Take 1 Tab by mouth every day.  Dose:  10 mg            Allergies  No Known Allergies    DIET  Orders Placed This Encounter   Procedures   • Diet Order Cardiac     Standing Status:   Standing     Number of Occurrences:   1     Order Specific Question:   Diet:     Answer:   Cardiac [6]       ACTIVITY  As tolerated.  Weight bearing as tolerated    CONSULTATIONS  cardiology    PROCEDURES  Coronary angiography    LABORATORY  Lab Results   Component Value Date    SODIUM 139 11/02/2018    POTASSIUM 4.0 11/02/2018    CHLORIDE 106 11/02/2018    CO2 25 11/02/2018    GLUCOSE 137 (H) 11/02/2018    BUN 16 11/02/2018    CREATININE 1.10 11/02/2018        Lab Results   Component Value Date    WBC 14.1 (H) 11/02/2018    HEMOGLOBIN 14.1 11/02/2018    HEMATOCRIT 42.6 11/02/2018    PLATELETCT 305 11/02/2018        Total time of the discharge process exceeds 35 minutes.

## 2018-11-02 NOTE — PROGRESS NOTES
12 hour chart check     12 hour monitor summery:    Rhythm: SR 70-80's  12.10.42 depression of ST segment still noted.  Ectopy: rare PAC/ PVC    2 RN skin check to be completed at bedside at change of shift.

## 2018-11-02 NOTE — CONSULTS
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   Maximum troponins 9.59, uptrending.   EKG showed no ST elevation, but repolization abnormalities suggestive of ischemia.  The patient was admitted to the ICU for hypertension (-200).  Cardiology was consulted for NSTEMI.      Chest discomfort started 2 days prior, it has been pressure like in nature, located across his chest, associated with worsened shortness of breath and orthopnea.  No radiation, no nausea/vomiting. Chest pressure is not provoked by activity.  He regularly climbs stairs at work.   Chest pressure is non positional, not affected by respirations.   He has had no prior instances of angina pectoralis.      In 9/2017, the patient was living in [facility] when he experienced shortness of breath.  He went to the [facility] and was treated for acute asthma exacerbation.  EKG changes and troponin elevation prompted transport to [facility] for NSTEMI.  While there he was cathed with two BELINDA placed in the LAD.  The patient describes following up with a cardiologist associated with [facility].  He recalls being on aspirin 81mg and atorvastatin.  He does not recall is other medications.  Medical records from outside facility pending.  The patient smokes, 1/2 ppd for he past 18 years.  He has diabetes type 2 and hypertension.  Family history is positive for myocardial infarction in his father when he was 45 years old.      In the ED, Trop 0.54, .  CXR showed no acute processes.  EKG showed EKG showed no ST elevation, but repolization abnormalities suggestive of ischemia.    Interval events:   -Underwent cardiac catheterization with stenting of 100% occluded dominant left circumflex coronary artery.  -Chest pain improved following  catheterization.  -Loaded on prasugrel.  If patient's insurance will not cover, okay to discharge with clopidogrel 75 mg by mouth daily starting tomorrow.  No loading dose necessary.  On aspirin 81 mg by mouth daily.  -Normal sinus rhythm, HR 60-70s, -140s/60-90s.  -Troponins 0.54>>1.61>>9.59.  -Okay to discharge to home per cardiology.       Final cardiology recommendations:  -Prasugrel or clopidogrel for at least 12 months.  -Aspirin 81 mg by mouth daily indefinitely.  -Continue metoprolol 50 mg by mouth twice daily.  -Continue lisinopril 10 mg by mouth daily.  -Continue atorvastatin 40 mg by mouth daily.  -Sublingual nitroglycerin as need for chest discomfort.  -Diabetes medications per hospitalist.  -Absolute smoking cessation.  -Patient counseled on the importance of medication compliance and risk of cardiac death if noncompliant.  -Follow up appointment with [facility] made.      MEDICATIONS:    Current Facility-Administered Medications:   •  lisinopril (PRINIVIL) 10 MG tablet 10 mg, 10 mg, Oral, Q DAY, Iban Dunlap M.D., 10 mg at 11/02/18 0835  •  nitroglycerin (NITROSTAT) tablet 0.4 mg, 0.4 mg, Sublingual, Q5 MIN PRN, Jones Doran M.D.  •  acetaminophen (TYLENOL) tablet 650 mg, 650 mg, Oral, Q6HRS PRN, Jatinder Alexis M.D.  •  cloNIDine (CATAPRES) tablet 0.1 mg, 0.1 mg, Oral, Q6HRS PRN, Jatinder Alexis M.D.  •  ondansetron (ZOFRAN) syringe/vial injection 4 mg, 4 mg, Intravenous, Q4HRS PRN, Jatinder Alexis M.D.  •  ondansetron (ZOFRAN ODT) dispertab 4 mg, 4 mg, Oral, Q4HRS PRN, Jatinder Alexis M.D.  •  promethazine (PHENERGAN) tablet 12.5-25 mg, 12.5-25 mg, Oral, Q4HRS PRN, Jatinder Alexis M.D.  •  promethazine (PHENERGAN) suppository 12.5-25 mg, 12.5-25 mg, Rectal, Q4HRS PRN, Jatinder Alexis M.D.  •  prochlorperazine (COMPAZINE) injection 5-10 mg, 5-10 mg, Intravenous, Q4HRS PRN, Jatinder Alexis M.D.  •  senna-docusate (PERICOLACE or SENOKOT S) 8.6-50 MG per tablet 2 Tab, 2  Tab, Oral, BID, 2 Tab at 11/02/18 0446 **AND** polyethylene glycol/lytes (MIRALAX) PACKET 1 Packet, 1 Packet, Oral, QDAY PRN **AND** magnesium hydroxide (MILK OF MAGNESIA) suspension 30 mL, 30 mL, Oral, QDAY PRN **AND** bisacodyl (DULCOLAX) suppository 10 mg, 10 mg, Rectal, QDAY PRN, Jatinder Alexis M.D.  •  metoprolol (LOPRESSOR) tablet 50 mg, 50 mg, Oral, TWICE DAILY, Iban Dunlap M.D., 50 mg at 11/02/18 0445  •  atorvastatin (LIPITOR) tablet 40 mg, 40 mg, Oral, QHS, Iban Dunlap M.D., 40 mg at 11/01/18 2041  •  aspirin EC (ECOTRIN) tablet 81 mg, 81 mg, Oral, DAILY, Ilya Huffman M.D., 81 mg at 11/02/18 0445  •  prasugrel (EFFIENT) tablet 60 mg, 60 mg, Oral, Once, Ilya Huffman M.D., Stopped at 11/01/18 1600  •  prasugrel (EFFIENT) tablet 10 mg, 10 mg, Oral, DAILY, Ilya Huffman M.D., 10 mg at 11/02/18 0445  •  ALPRAZolam (XANAX) tablet 1 mg, 1 mg, Oral, 4X/DAY PRN, Himanshu Solano M.D.    ALLERGIES:   Mr. Sp Burch  has No Known Allergies.     SURGERIES:  Mr. Sp Burch   has no past surgical history on file.     MEDICAL ILLNESSES:  Mr. Sp Burch   has a past medical history of Diabetes (HCC); Hypertension; and Myocardial infarct (HCC).     SOCIAL HISTORY:  Mr. Sp Burch   reports that he has been smoking.  He has never used smokeless tobacco. He reports that he does not drink alcohol or use drugs.     FAMILY HISTORY:  Mr.'s Sp Burch  family history includes Diabetes in his mother; Heart Attack (age of onset: 45) in his father; Heart Disease in his father; Hypertension in his father.      ROS:    Constitutional: Patient has had no fevers or chills or fatigue. Patient has has no significant weight change.  Eyes: Patient had no visual changes or vision loss.  ENT: Patient denies any sore throat or allergic rhinitis.  Respiratory: Patient has had no cough or sputum production. Also, no hemoptysis.  Cardiovascular: As noted above.  GI: Patient denies any nausea,  "vomiting, or diarrhea. Patient has had no hematemesis or melena.  : Patient denies any urinary urgency, frequency, or dysuria. Patient has noted no hematuria.  Orthopedic: Patient has no particular problem with arthritis. Patient is able to walk and exercise without difficulty.  Neurologic: Patient has had no focal numbness or weakness.   Psychiatric: Patient denies any difficulty with anxiety or depression.  Endocrine: Patient denies any history of thyroid disorder or diabetes. Patient denies any heat or cold intolerance. In addition, patient denies any polydipsia or polyuria.  Hematologic: Patient has had no easy bruising or bleeding. Patient denies any history of anemia.  Skin: Patient denies any unusual rashes or skin lesions.  Allergic/immunologic: Patient denies any difficulty with hayfever or other environmental allergens. Patient denies any food allergies.       PHYSICAL EXAM:    BP (!) 204/152   Pulse 78   Temp 36.6 °C (97.8 °F)   Resp 12   Ht 1.626 m (5' 4\")   Wt 92.4 kg (203 lb 11.3 oz)   SpO2 95%   BMI 34.97 kg/m²      General: in no acute distress.  HEENT: NC, AT, PERRL, EOMI, lids and conjunctiva are clear, moist oral mucosa. No JVD  Respit: CTAB, no wheezing or rales  Cardiac: RRR, no murmurs, S2 S2.  Non tender chest.    Abdomen: soft, nontender, nondistended  Extremities: No clubbing, cyanosis, or edema is present.  Skin: warm  Neurologic: AOx3, No focal neurologic abnormality noted.    No results found for: CHOLSTRLTOT, LDL, HDL, TRIGLYCERIDE    Lab Results   Component Value Date/Time    SODIUM 139 11/02/2018 04:45 AM    POTASSIUM 4.0 11/02/2018 04:45 AM    CHLORIDE 106 11/02/2018 04:45 AM    CO2 25 11/02/2018 04:45 AM    GLUCOSE 137 (H) 11/02/2018 04:45 AM    BUN 16 11/02/2018 04:45 AM    CREATININE 1.10 11/02/2018 04:45 AM     Lab Results   Component Value Date/Time    ALKPHOSPHAT 60 11/01/2018 12:32 AM    ASTSGOT 19 11/01/2018 12:32 AM    ALTSGPT 19 11/01/2018 12:32 AM    TBILIRUBIN 0.6 " 11/01/2018 12:32 AM      Lab Results   Component Value Date/Time    BNPBTYPENAT 234 (H) 11/01/2018 12:32 AM       Patient Active Problem List    Diagnosis Date Noted   • Ischemic cardiomyopathy 11/02/2018   • Hypertensive emergency 11/01/2018   • Essential hypertension 11/01/2018   • Type 2 diabetes mellitus with hyperglycemia, without long-term current use of insulin ([facility]) 11/01/2018   • Class 1 obesity due to excess calories without serious comorbidity with body mass index (BMI) of 34.0 to 34.9 in adult 11/01/2018   • Coronary artery disease involving native coronary artery of native heart with angina pectoris ([facility]) 11/01/2018   • Non-STEMI (non-ST elevated myocardial infarction) ([facility]) 11/01/2018         ASSESSMENT & PLAN:  -NSTEMI   -Troponins 0.54>>1.61>>9.59.   -Admitting EKG:  No ST elevation, repolarization abnormality that suggest ischemia   -Complains of chest pressure   -History of NSTEMI with BELINDA x2 in the LAD on 9/2017   -Underwent cardiac catheterization 11/2 with stenting of 100% occluded dominant left circumflex coronary artery.   -Chest pain improved following catheterization.   -Loaded on prasugrel.  If patient's insurance will not cover, okay to discharge with clopidogrel 75 mg by mouth daily starting tomorrow.  No loading dose necessary.  On aspirin 81 mg by mouth daily.  -HTN emergency   - to 200 with NSTEMI   -History of hypertension  -History of Asthma   -At home on albuterol  -Diabetes type 2   -Patient working to improved diet and increase activity level.  -Medication noncompliance        Plan:  -Okay to discharge to home per cardiology.     Final cardiology recommendations:  -Prasugrel or clopidogrel for at least 12 months.  -Aspirin 81 mg by mouth daily indefinitely.  -Continue metoprolol 50 mg by mouth twice daily.  -Continue lisinopril 10 mg by mouth daily.  -Continue atorvastatin 40 mg by mouth daily.  -Sublingual nitroglycerin as need for chest discomfort.  -Diabetes medications  per hospitalist.  -Absolute smoking cessation.  -Patient counseled on the importance of medication compliance and risk of cardiac death if noncompliant.  -Follow up appointment with [facility] made.      Thank you for letting us take part in the care of the patient. Please call if questions.

## 2018-11-02 NOTE — PROGRESS NOTES
[facility] Cardiology Follow-up Consult Note  Date of note:    11/2/2018      Consulting Physician: Iban Dunlap M.D.    Patient ID:    Name:   Mina Burch  YOB: 1985  Age:   33 y.o.  MRN:   [id number]    Reason for Consultation:  Non-STEMI (non-ST elevated myocardial infarction) (HCC)    HPI (per Dr. 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   Maximum troponins 9.59, uptrending.   EKG showed no ST elevation, but repolization abnormalities suggestive of ischemia.  The patient was admitted to the ICU for hypertension (-200).  Cardiology was consulted for NSTEMI.      Chest discomfort started 2 days prior, it has been pressure like in nature, located across his chest, associated with worsened shortness of breath and orthopnea.  No radiation, no nausea/vomiting. Chest pressure is not provoked by activity.  He regularly climbs stairs at work.   Chest pressure is non positional, not affected by respirations.   He has had no prior instances of angina pectoralis.      In 9/2017, the patient was living in [facility] when he experienced shortness of breath.  He went to the [facility] and was treated for acute asthma exacerbation.  EKG changes and troponin elevation prompted transport to [facility] for NSTEMI.  While there he was cathed with two BELINDA placed in the LAD.  The patient describes following up with a cardiologist associated with [facility].  He recalls being on aspirin 81mg and atorvastatin.  He does not recall is other medications.  Medical records from outside facility pending.  The patient smokes, 1/2 ppd for he past 18 years.  He has diabetes type 2 and hypertension.  Family history is positive for myocardial infarction in his father when he was 45 years old.      In the ED, Trop 0.54, .  CXR showed no  acute processes.  EKG showed EKG showed no ST elevation, but repolization abnormalities suggestive of ischemia.    Interim Events:  -Underwent cardiac catheterization with stenting of 100% occluded dominant left circumflex coronary artery.  -Chest pain improved following catheterization.  -Loaded on prasugrel.  If patient's insurance will not cover, okay to discharge with clopidogrel 75 mg by mouth daily starting tomorrow.  No loading dose necessary.  On aspirin 81 mg by mouth daily.  -Normal sinus rhythm, HR 60-70s, -140s/60-90s.  -Troponins 0.54>>1.61>>9.59.  -Okay to discharge to home per cardiology.       Final cardiology recommendations:  -Prasugrel or clopidogrel for at least 12 months.  -Aspirin 81 mg by mouth daily indefinitely.  -Continue metoprolol 50 mg by mouth twice daily.  -Continue lisinopril 10 mg by mouth daily.  -Continue atorvastatin 40 mg by mouth daily.  -Sublingual nitroglycerin as need for chest discomfort.  -Diabetes medications per hospitalist.  -Absolute smoking cessation.  -Patient counseled on the importance of medication compliance and risk of cardiac death if noncompliant.  -Follow up appointment with [facility] made.           ROS  Review of Systems   Constitutional: Negative for chills and fever.   HENT: Negative for ear pain and sore throat.    Eyes: Negative for blurred vision and double vision.   Respiratory: Negative for cough and shortness of breath.    Cardiovascular: Negative for chest pain (improved follow cath and stenting), palpitations and leg swelling.   Gastrointestinal: Negative for abdominal pain, blood in stool, constipation, diarrhea, nausea and vomiting.   Genitourinary: Negative for dysuria, frequency and urgency.   Musculoskeletal: Negative for falls, joint pain and myalgias.   Skin: Negative for itching and rash.   Neurological: Negative for dizziness, focal weakness, weakness and headaches.   Endo/Heme/Allergies: Negative for environmental  allergies. Does not bruise/bleed easily.   Psychiatric/Behavioral: Negative for memory loss. The patient is not nervous/anxious.           Past medical, surgical, social, and family history reviewed and unchanged from admission except as noted in assessment and plan.    Medications: Reviewed in MAR  Current Facility-Administered Medications   Medication Dose Frequency Provider Last Rate Last Dose   • lisinopril (PRINIVIL) 10 MG tablet 10 mg  10 mg Q DAY Iban Dunlap M.D.   10 mg at 11/02/18 0835   • nitroglycerin (NITROSTAT) tablet 0.4 mg  0.4 mg Q5 MIN PRN Jones Doran M.D.       • acetaminophen (TYLENOL) tablet 650 mg  650 mg Q6HRS PRN Jatinder Alexis M.D.       • cloNIDine (CATAPRES) tablet 0.1 mg  0.1 mg Q6HRS PRN Jatinder Alexis M.D.       • ondansetron (ZOFRAN) syringe/vial injection 4 mg  4 mg Q4HRS PRN Jatinder Alexis M.D.       • ondansetron (ZOFRAN ODT) dispertab 4 mg  4 mg Q4HRS PRN Jatinder Alexis M.D.       • promethazine (PHENERGAN) tablet 12.5-25 mg  12.5-25 mg Q4HRS PRN Jatinder Alexis M.D.       • promethazine (PHENERGAN) suppository 12.5-25 mg  12.5-25 mg Q4HRS PRN Jatinder Alexis M.D.       • prochlorperazine (COMPAZINE) injection 5-10 mg  5-10 mg Q4HRS PRN Jatinder Alexis M.D.       • senna-docusate (PERICOLACE or SENOKOT S) 8.6-50 MG per tablet 2 Tab  2 Tab BID Jatinder Alexis M.D.   2 Tab at 11/02/18 0446    And   • polyethylene glycol/lytes (MIRALAX) PACKET 1 Packet  1 Packet QDAY PRN Jatinder Alexis M.D.        And   • magnesium hydroxide (MILK OF MAGNESIA) suspension 30 mL  30 mL QDAY PRN Jatinder Alexis M.D.        And   • bisacodyl (DULCOLAX) suppository 10 mg  10 mg QDAY PRN Jatinder Alexis M.D.       • metoprolol (LOPRESSOR) tablet 50 mg  50 mg TWICE DAILY Iban Dunlap M.D.   50 mg at 11/02/18 0445   • atorvastatin (LIPITOR) tablet 40 mg  40 mg QHS Iban Dunlap M.D.   40 mg at 11/01/18 2041   • aspirin EC (ECOTRIN) tablet 81 mg  81 mg DAILY Ilya Huffman,  "M.D.   81 mg at 11/02/18 0445   • prasugrel (EFFIENT) tablet 60 mg  60 mg Once Ilya Huffman M.D.   Stopped at 11/01/18 1600   • prasugrel (EFFIENT) tablet 10 mg  10 mg DAILY Ilya Huffman M.D.   10 mg at 11/02/18 0445   • ALPRAZolam (XANAX) tablet 1 mg  1 mg 4X/DAY PRN Himanshu Solano M.D.         Last reviewed on 11/1/2018  4:44 AM by Kristie Ryan R.N.  No Known Allergies      Physical Exam  Body mass index is 34.97 kg/m². Blood pressure (!) 204/152, pulse 78, temperature (P) 36.6 °C (97.8 °F), resp. rate (P) 12, height 1.626 m (5' 4\"), weight 92.4 kg (203 lb 11.3 oz), SpO2 (P) 96 %. Vitals:    11/02/18 1000 11/02/18 1030 11/02/18 1100 11/02/18 1200   BP:       Pulse: 68 70 78    Resp:    (P) 12   Temp:    (P) 36.6 °C (97.8 °F)   SpO2: 99% 93% 95% (P) 96%   Weight:       Height:        Oxygen Therapy:  Pulse Oximetry: (P) 96 %, O2 (LPM): 2, O2 Delivery: (P) None (Room Air)    Physical Exam   Constitutional: He is oriented to person, place, and time. He appears well-developed and well-nourished. No distress.   HENT:   Head: Normocephalic and atraumatic.   Right Ear: External ear normal.   Left Ear: External ear normal.   Nose: Nose normal.   Mouth/Throat: Oropharynx is clear and moist. No oropharyngeal exudate.   Eyes: Pupils are equal, round, and reactive to light. Conjunctivae and EOM are normal. Right eye exhibits no discharge. Left eye exhibits no discharge. No scleral icterus.   Neck: No JVD present. No tracheal deviation present. No thyromegaly present.   Cardiovascular: Normal rate, regular rhythm, normal heart sounds and intact distal pulses.  Exam reveals no gallop and no friction rub.    No murmur heard.  Pulmonary/Chest: Effort normal and breath sounds normal. No stridor. No respiratory distress. He has no wheezes. He has no rales.   Abdominal: Soft. Bowel sounds are normal. He exhibits no distension and no mass. There is no tenderness. There is no rebound and no guarding. "   Musculoskeletal: He exhibits no edema, tenderness or deformity.   Lymphadenopathy:     He has no cervical adenopathy.   Neurological: He is alert and oriented to person, place, and time. No cranial nerve deficit. He exhibits normal muscle tone.   Skin: Skin is warm and dry. No rash noted. He is not diaphoretic. No erythema. No pallor.   Psychiatric: He has a normal mood and affect. His behavior is normal. Judgment and thought content normal.   Nursing note and vitals reviewed.        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  :        1985                   Requested By:ROYAL ALVAREZ  Order #:    [id number]                    Reading MD: Jose Stallworth MD    Measurements  Intervals                                Axis  Rate:       85                           P:          43  TX:         135                          QRS:        1  QRSD:       98                           T:          192  QT:         408  QTc:        486    Interpretive Statements  SINUS RHYTHM  RSR' IN V1 OR V2, PROBABLY NORMAL VARIANT  REPOL ABNRM SUGGESTS ISCHEMIA, ANTEROLATERAL  BORDERLINE ST ELEVATION, ANTERIOR LEADS  Compared to ECG 2018 01:39:52  RSR' in V1 or V2 now present  Possible ischemia now present  ST (T wave) deviation now present  Sinus tachycardia no longer present  Prolonged QT interval  no longer present    Electronically Signed On 2018 6:49:02 PDT by Jose Stallworth MD     EC-ECHOCARDIOGRAM COMPLETE W/O CONT    Collection Time: 18  7:17 PM   Result Value Ref Range    Eject.Frac. MOD BP 30.37     Eject.Frac. MOD 4C 30.41     Eject.Frac. MOD 2C 34.91      Left Ventrical Ejection Fraction 25    CBC with Differential    Collection Time: 11/02/18  4:45 AM   Result Value Ref Range    WBC 14.1 (H) 4.8 - 10.8 K/uL    RBC 4.96 4.70 - 6.10 M/uL    Hemoglobin 14.1 14.0 - 18.0 g/dL    Hematocrit 42.6 42.0 - 52.0 %    MCV 85.9 81.4 - 97.8 fL    MCH 28.4 27.0 - 33.0 pg    MCHC 33.1 (L) 33.7 - 35.3 g/dL    RDW 39.6 35.9 - 50.0 fL    Platelet Count 305 164 - 446 K/uL    MPV 9.6 9.0 - 12.9 fL    Neutrophils-Polys 65.10 44.00 - 72.00 %    Lymphocytes 26.00 22.00 - 41.00 %    Monocytes 6.80 0.00 - 13.40 %    Eosinophils 1.00 0.00 - 6.90 %    Basophils 0.60 0.00 - 1.80 %    Immature Granulocytes 0.50 0.00 - 0.90 %    Nucleated RBC 0.00 /100 WBC    Neutrophils (Absolute) 9.19 (H) 1.82 - 7.42 K/uL    Lymphs (Absolute) 3.67 1.00 - 4.80 K/uL    Monos (Absolute) 0.96 (H) 0.00 - 0.85 K/uL    Eos (Absolute) 0.14 0.00 - 0.51 K/uL    Baso (Absolute) 0.08 0.00 - 0.12 K/uL    Immature Granulocytes (abs) 0.07 0.00 - 0.11 K/uL    NRBC (Absolute) 0.00 K/uL   Basic Metabolic Panel (BMP)    Collection Time: 11/02/18  4:45 AM   Result Value Ref Range    Sodium 139 135 - 145 mmol/L    Potassium 4.0 3.6 - 5.5 mmol/L    Chloride 106 96 - 112 mmol/L    Co2 25 20 - 33 mmol/L    Glucose 137 (H) 65 - 99 mg/dL    Bun 16 8 - 22 mg/dL    Creatinine 1.10 0.50 - 1.40 mg/dL    Calcium 8.6 8.5 - 10.5 mg/dL    Anion Gap 8.0 0.0 - 11.9   ESTIMATED GFR    Collection Time: 11/02/18  4:45 AM   Result Value Ref Range    GFR If African American >60 >60 mL/min/1.73 m 2    GFR If Non African American >60 >60 mL/min/1.73 m 2           Cardiac Imaging and Procedures Review:    EKG and telemetry tracings personally reviewed.    Impression and Medical Decision Making:  Principal Problem:    Non-STEMI (non-ST elevated myocardial infarction) (HCC) POA: Yes  Active Problems:    Hypertensive emergency POA: Unknown    Essential hypertension POA: Unknown    Type 2 diabetes mellitus with hyperglycemia, without long-term current use  of insulin (HCC) POA: Unknown    Class 1 obesity due to excess calories without serious comorbidity with body mass index (BMI) of 34.0 to 34.9 in adult POA: Unknown    Coronary artery disease involving native coronary artery of native heart with angina pectoris (HCC) POA: Unknown    Ischemic cardiomyopathy POA: Unknown  Resolved Problems:    STEMI (ST elevation myocardial infarction) ([facility]) POA: Unknown    Ventricular tachycardia ([facility]) POA: Unknown    ASSESSMENT & PLAN:      * Non-STEMI (non-ST elevated myocardial infarction) ([facility])- (present on admission)   Assessment & Plan    -Troponins 0.54>>1.61>>9.59.   -Admitting EKG:  No ST elevation, repolarization abnormality that suggest ischemia   -Complains of chest pressure   -History of NSTEMI with BELINDA x2 in the LAD on 9/2017   -Underwent cardiac catheterization 11/2 with stenting of 100% occluded dominant left circumflex coronary artery.   -Chest pain improved following catheterization.   -Loaded on prasugrel.  If patient's insurance will not cover, okay to discharge with clopidogrel 75 mg by mouth daily starting tomorrow.  No loading dose necessary.  On aspirin 81 mg by mouth daily.  -HTN emergency   - to 200 with NSTEMI   -History of hypertension    Plan:  -Okay to discharge to home per cardiology.     Final cardiology recommendations:  -Prasugrel or clopidogrel for at least 12 months.  -Aspirin 81 mg by mouth daily indefinitely.  -Continue metoprolol 50 mg by mouth twice daily.  -Continue lisinopril 10 mg by mouth daily.  -Continue atorvastatin 40 mg by mouth daily.  -Sublingual nitroglycerin as need for chest discomfort.  -Diabetes medications per hospitalist.  -Absolute smoking cessation.  -Patient counseled on the importance of medicati        Ischemic cardiomyopathy- (present on admission)   Assessment & Plan    As per NSTEMI.        Coronary artery disease involving native coronary artery of native heart with angina pectoris (HCC)- (present on admission)    Assessment & Plan    As per NSTEMI.        Class 1 obesity due to excess calories without serious comorbidity with body mass index (BMI) of 34.0 to 34.9 in adult- (present on admission)   Assessment & Plan    BMI 35.    Plan:  - patient on lifestyle changes for weight loss.        Type 2 diabetes mellitus with hyperglycemia, without long-term current use of insulin (HCC)- (present on admission)   Assessment & Plan    Noncompliant. HgA1c 7.8.    Plan:  -Management per hospitalist.        Essential hypertension- (present on admission)   Assessment & Plan    Controlled. -140s/60-90s.    Plan:  -Continue metoprolol 50 mg by mouth twice daily.  -Continue lisinopril 10 mg by mouth daily.        Hypertensive emergency   Assessment & Plan    Resolved.    Plan:  -Continue metoprolol 50 mg by mouth twice daily.  -Continue lisinopril 10 mg by mouth daily.                It is my pleasure to participate in the care of Mr. Sp Burch.  Please do not hesitate to contact me with questions or concerns.

## 2018-11-02 NOTE — PROGRESS NOTES
12 hour chart check    MS: SR , 10 beats Vtach run at 0652. Stent to Proximal Circumflex this afternoon. No chest pain, just SOB post cath. No ectopy post-cath.

## 2018-11-02 NOTE — DISCHARGE INSTRUCTIONS
Discharge Instructions    Discharged to home by car with friend. Discharged via wheelchair, hospital escort: Yes.  Special equipment needed: Not Applicable    Be sure to schedule a follow-up appointment with your primary care doctor or any specialists as instructed.     Discharge Plan:   Diet Plan: Discussed  Activity Level: Discussed  Smoking Cessation Offered: Patient Counseled  Confirmed Follow up Appointment: Appointment Scheduled  Confirmed Symptoms Management: Discussed  Medication Reconciliation Updated: Yes  Pneumococcal Vaccine Administered/Refused: Not given - Patient refused pneumococcal vaccine  Influenza Vaccine Indication: Patient Refuses    I understand that a diet low in cholesterol, fat, and sodium is recommended for good health. Unless I have been given specific instructions below for another diet, I accept this instruction as my diet prescription.   Other diet: cardiac    Special Instructions: Diagnosis:  Acute Coronary Syndrome (ACS) is a diagnosis that encompasses cardiac-related chest pain and heart attack. ACS occurs when the blood flow to the heart muscle is severely reduced or cut off completely due to a slow process called atherosclerosis.  Atherosclerosis is a disease in which the coronary arteries become narrow from a buildup of fat, cholesterol, and other substances that combine to form plaque. If the plaque breaks, a blood clot will form and block the blood flow to the heart muscle. This lack of blood flow can cause damage or death to the heart muscle which is called a heart attack or Myocardial Infarction (MI). There are two different types of MIs:  ST Elevation Myocardial Infarction or STEMI (the most severe type of heart attack) and Non-ST Elevation Myocardial Infarction or NSTEMI.    Treatment Plan:  · Cardiac Diet  - Low fat, low salt, low cholesterol   · Cardiac Rehab  - Your doctor has ordered you a referral to [facility].  Call [phone] to schedule an appointment.  · Attend my  follow-up appointment with my Cardiologist.  · Take my medications as prescribed by my doctor  · Exercise daily  · Lower my bad cholesterol and raise my good cholesterol    Medications:  Certain medications are used to treat ACS.  Remember to always take medications as prescribed and never stop talking medications unless told by your doctor.    You have been prescribed the following medicatons:    Aspirin - Aspirin is used as a blood thinning medication and you will require this medication indefinitely.  Anti-platelet/blood thinner - Your Anti-platelet/Blood thinning medication is called Effient, and is used in combination with aspirin to prevent clots from forming in your heart and/or around your stent.  Your doctor will determine how long you need to be on this medicine.  Beta-Blocker - Beta-Blocker metoprolol is used to lower blood pressure and heart rate, and/or helps your heart heal after a heart attack.  Statin - Statin atorvastatin is used to lower cholesterol.  Angiotensin Converting Enzyme (ACE) Inhibitor - Angiotensin Converting Enzyme Inhibitor lisinopril is used to lower blood pressure and treat heart failure.  Nitroglycerine - Nitroglycerine is used to relieve chest pain.    · Is patient discharged on Warfarin / Coumadin?   No     Depression / Suicide Risk    As you are discharged from this [facility] facility, it is important to learn how to keep safe from harming yourself.    Recognize the warning signs:  · Abrupt changes in personality, positive or negative- including increase in energy   · Giving away possessions  · Change in eating patterns- significant weight changes-  positive or negative  · Change in sleeping patterns- unable to sleep or sleeping all the time   · Unwillingness or inability to communicate  · Depression  · Unusual sadness, discouragement and loneliness  · Talk of wanting to die  · Neglect of personal appearance   · Rebelliousness- reckless behavior  · Withdrawal from  people/activities they love  · Confusion- inability to concentrate     If you or a loved one observes any of these behaviors or has concerns about self-harm, here's what you can do:  · Talk about it- your feelings and reasons for harming yourself  · Remove any means that you might use to hurt yourself (examples: pills, rope, extension cords, firearm)  · Get professional help from the community (Mental Health, Substance Abuse, psychological counseling)  · Do not be alone:Call your Safe Contact- someone whom you trust who will be there for you.  · Call your local CRISIS HOTLINE 464-6028 or 589-216-3230  · Call your local Children's Mobile Crisis Response Team [place] (282) 299-9860 or www.Teak  · Call the toll free National Suicide Prevention Hotlines   · National Suicide Prevention Lifeline 895-258-GBCT (1233)  · National Hope Line Network 800-SUICIDE (886-5506)    Carotid Angioplasty With Stent, Care After  These instructions give you information about caring for yourself after your procedure. Your doctor may also give you more specific instructions. Call your doctor if you have any problems or questions after your procedure.  HOME CARE  · Take medicines only as told by your doctor.  · You may shower. Take off the bandage and gently wash the area where the tube was inserted with soap and water. Pat the area dry with a clean towel. Do not rub the area.  · Do not take baths, swim, or use a hot tub.  · Check the area where the tube was inserted every day. Check for redness, swelling, and fluid coming from the area.  · Do not put powder or lotion on the area.  · Do not lift heavy objects. This is anything weighing over 10 lb (4.5 kg).  · Ask your doctor when it is okay to:  ¨ Go back to work or school.  ¨ Do physical activities or play sports.  ¨ Have sex.  · Eat a diet that is healthy for your heart. This includes:  ¨ Fresh fruits and vegetables.  ¨ Lean cuts of meat.  · Avoid:  ¨ Foods high in  salt.  ¨ Canned or very processed foods.  ¨ Food that is high in saturated fat or sugar.  ¨ Fried food.  · Make other healthy changes as told by your doctor, which may include these:  ¨ Do not smoke, chew tobacco, or use electronic cigarettes.  ¨ Keep a healthy weight.  ¨ Exercise often.  ¨ Manage your blood pressure.  ¨ Limit how much alcohol you drink.  ¨ Manage other health problems, such as diabetes and sleep apnea.  · Keep all follow-up visits as told by your doctor. This is important.  GET HELP IF:  · You have a fever.  · You have chills.  · You have more bleeding from the area where the tube was inserted. Hold pressure on the area.  GET HELP RIGHT AWAY IF:  · You have vision changes or loss of vision.  · You have numbness or weakness on one side of your body.  · You have trouble talking, or you have slurred speech or cannot speak.  · You feel confused or have trouble remembering.  · You have a lot of pain at the area where the tube was inserted.  · You have redness, warmth, or swelling at the area where the tube was inserted.  · You have fluid coming from the area where the tube was inserted. This is more than a small amount of blood on the bandage.  · The area where the tube was inserted is bleeding, and the bleeding does not stop after 30 minutes of holding steady pressure on the area.  These symptoms may be an emergency. Do not wait to see if the symptoms will go away. Get help right away. Call your local emergency services (911 in [place] Do not drive yourself to the hospital.     This information is not intended to replace advice given to you by your health care provider. Make sure you discuss any questions you have with your health care provider.     Document Released: 12/23/2014 Document Revised: 01/08/2016 Document Reviewed: 07/06/2015  Elsevier Interactive Patient Education ©2016 [employer]

## 2018-11-02 NOTE — ASSESSMENT & PLAN NOTE
Controlled. -140s/60-90s.    Plan:  -Continue metoprolol 50 mg by mouth twice daily.  -Continue lisinopril 10 mg by mouth daily.

## 2018-11-02 NOTE — ASSESSMENT & PLAN NOTE
-Troponins 0.54>>1.61>>9.59.   -Admitting EKG:  No ST elevation, repolarization abnormality that suggest ischemia   -Complains of chest pressure   -History of NSTEMI with BELINDA x2 in the LAD on 9/2017   -Underwent cardiac catheterization 11/2 with stenting of 100% occluded dominant left circumflex coronary artery.   -Chest pain improved following catheterization.   -Loaded on prasugrel.  If patient's insurance will not cover, okay to discharge with clopidogrel 75 mg by mouth daily starting tomorrow.  No loading dose necessary.  On aspirin 81 mg by mouth daily.  -HTN emergency   - to 200 with NSTEMI   -History of hypertension    Plan:  -Okay to discharge to home per cardiology.     Final cardiology recommendations:  -Prasugrel or clopidogrel for at least 12 months.  -Aspirin 81 mg by mouth daily indefinitely.  -Continue metoprolol 50 mg by mouth twice daily.  -Continue lisinopril 10 mg by mouth daily.  -Continue atorvastatin 40 mg by mouth daily.  -Sublingual nitroglycerin as need for chest discomfort.  -Diabetes medications per hospitalist.  -Absolute smoking cessation.  -Patient counseled on the importance of medicati

## 2018-11-02 NOTE — ASSESSMENT & PLAN NOTE
Status post PCI 11/1 for left circ occlusion  EF 50-55%    STEMI/NSTEMI:   Cardiology: Dr Huffman  Cath findings:  1.  100% occluded proximal dominant left circumflex coronary artery, thrombotic  2.  Patent mid and distal LAD stents with diffuse nonobstructive CAD throughout  3.  60% tandem RCA stenoses with diffuse distal small vessel CAD  4.  LVEF 50-55% preintervention  5.  Successful PCI LCx (3.0 x 33 mm Synergy BELINDA posted to 3.25 mm), excellent result DANAE-3 flow  Anticoagulation: asa, effient  Dual antiplatelet  Beta Blocker w/in 1st 24 hours unless contraindicated  High intensity Statin    Reduced EF: n/a  ACE/ARB  Spirnolactone/eplerenone    ECHO: Ef 50-55%    Also:   Tobacco sensation  BP goal < 140/90  Physical activity 30 mins 3-5x/wk  BMI   DM HgA1C<7  Flu vaccine  Avoid Oxygen therapy unless sat<92% (giving supplemental oxygen evidence showed larger infarct size)

## 2018-11-02 NOTE — PROGRESS NOTES
Bedside report received from night nurse. Pt awake using restroom. Assessed pt, no distress watching TV. Bed in lowest position, upper side rails up, call light within reach. No questions or request at this time.

## 2018-11-02 NOTE — PROGRESS NOTES
hemoband removed. No s/sx of hematoma formation. Pulses 2+. Skin warm and dry. Denies pain. States he has a bit of SOB when resting. Pulse ox indicates intermittent oxygen desaturation into low 80's, with apneic episodes. Bounces back to % with triggered breath. Teaching provided regarding limited wrist mobility for 48 hours. Verbalizes understanding. Careful monitoring in place.

## 2018-11-02 NOTE — CARE PLAN
Problem: Safety  Goal: Will remain free from injury  Education provided regarding importance of calling before ambulating and importance of cardiac monitoring at this time. Reinforcement needed however pt is steady on feet and not a fall risk.     Problem: Discharge Barriers/Planning  Goal: Patient's continuum of care needs will be met  Case coordinator consulted to help obtain discharged medications and financial assistance. Met with pt at bedside to fill out paperwork.

## 2018-11-02 NOTE — DISCHARGE PLANNING
Care Transition Team Assessment    11/2/18  Spoke with patient about discharge planning. Patient has no insurance. PFA has been contacted to assess if patient is eligible for coverage. According to patient, PFA states that he is overqualified for Medicaid. Patient states that his insurance will be kicking in in next couple of months through his employer. Patient needs assistance with medication coverage. Provided $4.00 medication list from [facility] and circled medication that patient is prescribed and he states that he can cover the cost of these medications. There is one medication Effient that is costly that case management will assist in having filled. Will continue to follow patient for needs.     Information Source  Orientation : Oriented x 4  Information Given By: Patient  Who is responsible for making decisions for patient? : Guardian    Readmission Evaluation  Is this a readmission?: No    Elopement Risk  Legal Hold: No  Ambulatory or Self Mobile in Wheelchair: Yes  Disoriented: No  Psychiatric Symptoms: None  History of Wandering: No  Elopement this Admit: No  Vocalizing Wanting to Leave: No  Displays Behaviors, Body Language Wanting to Leave: No-Not at Risk for Elopement  Elopement Risk: Not at Risk for Elopement    Interdisciplinary Discharge Planning  Does Admitting Nurse Feel This Could be a Complex Discharge?: No  Lives with - Patient's Self Care Capacity: Alone and Able to Care For Self  Patient or legal guardian wants to designate a caregiver (see row info): No  Support Systems: Friends / Neighbors  Housing / Facility: 1 Story Apartment / Condo  Do You Take your Prescribed Medications Regularly: No  Reasons Why Not Taking Medications :  (Unable to afford)  Able to Return to Previous ADL's: Yes  Mobility Issues: No  Prior Services: None  Assistance Needed: No  Durable Medical Equipment: Not Applicable    Discharge Preparedness  What is your plan after discharge?: Other (comment)  What are your  discharge supports?: Other (comment)  Prior Functional Level:  (Friends)  Difficulity with ADLs: None  Difficulity with IADLs: None    Functional Assesment  Prior Functional Level:  (Friends)    Finances  Financial Barriers to Discharge: Yes  Average Monthly Income:  (Unknown)  Source of Income: Employed  Prescription Coverage: No  Prescription Coverage Comments:  (Will need assistance for RX coverage)    Vision / Hearing Impairment  Vision Impairment : No  Hearing Impairment : No    Values / Beliefs / Concerns  Values / Beliefs Concerns : No    Advance Directive  Advance Directive?: None    Domestic Abuse  Have you ever been the victim of abuse or violence?: No  Physical Abuse or Sexual Abuse: No  Verbal Abuse or Emotional Abuse: No  Possible Abuse Reported to:: Not Applicable    Psychological Assessment  History of Substance Abuse: None  History of Psychiatric Problems: No  Non-compliant with Treatment: Yes    Discharge Risks or Barriers  Discharge risks or barriers?: No PCP, Uninsured / underinsured, Other (comment) (No RX coverage)  Patient risk factors: Noncompliance, Uninsured or underinsured    Anticipated Discharge Information  Anticipated discharge disposition: Home

## 2018-11-02 NOTE — PROGRESS NOTES
Discharge paperwork discussed with charge RN lance. Paperwork signed and prescription medication pickup locations and forms given to pt. effient to be picked up on [address] and the rest are to be filled at set [facility] pharmacy. Instructions on heart care and cath site given. Walked out of hospital per pt request. Ride waiting at [facility].

## 2018-11-02 NOTE — PROGRESS NOTES
Bedside report given to NOC ANA Salcido to assume pt care. Pt sitting up at edge of bed at this time, VSS. Drips verified. angiomax to be stopped at 1907. Radial cath site CDI. No hematoma.

## 2018-11-02 NOTE — ASSESSMENT & PLAN NOTE
Resolved.    Plan:  -Continue metoprolol 50 mg by mouth twice daily.  -Continue lisinopril 10 mg by mouth daily.

## 2018-11-02 NOTE — PROGRESS NOTES
Cardiovascular Nurse Navigator ([phone]) Note:    Reviewed ACS/PCI medications:  Dual Antiplatelet Therapy (DAPT):  aspirin + prasugrel*        (Cardiology MD to transition to clopidogrel due to patient's financial hardship/lack of insurance)  Beta-Blocker:  metoprolol  Statin:  atorvastatin  ACEI/ARB:  lisinopril  Aldosterone blocking agent:   N/A (EF 50%)    Intensive Cardiac Rehab (ICR) Referral:  Referred on 11/1; has current inpatient orders for nutrition consult & PT for Phase I ICR  ICR follow-up and contact info added to AVS:  Yes    Cardiology Follow-Up:  Contacted [facility] who have no record of this patient; pt forwarded to [facility] for scheduling.  Discussed with patient the importance of follow up and medication adherence.    Meds to Beds:  Pt referred to Meds to Beds program to obtain clopidogrel prescription prior to discharge.  Please call [phone] (or [phone] and request 'on-call anti-coag pharmacist' if after hours) if patient has not received medication at time of discharge.      Inpatient & Discharge Patient Education:  Bedside nursing to continually provide patient education on ACS meds, signs and symptoms to monitor for, and risk factor modification. Also at discharge please complete the “ACS” special instructions on the AVS.  Thank you and please call with questions.

## 2018-11-02 NOTE — PROGRESS NOTES
Critical Care Progress Note    Date of admission  11/1/2018    Chief Complaint  Chest pain    Hospital Course    33 y.o. male with history of coronary artery disease status post 2 stents, diabetes mellitus, and essential hypertension, for which she he is not taking any medications due to insurance difficulty was in his usual state of health until the day prior to admission.  He reports while lying in bed, having the sudden onset of chest pressure spreading across his entire chest with no radiation to his neck or arms.  He reports no exacerbating or alleviating factors.  He was subsequently brought to the emergency department for further evaluation.  He denies associated shortness of breath he does have some anxiety associated with the pain.  No other complaints. Taken from Dr Alexis note.       11/1  PCI of left circ ef 50-55%    Interval Problem Update  Reviewed last 24 hour events:  snr  Cardiac deit  11/1 BM  2 peripheral  Mobility  Nothing RT      Review of Systems  Review of Systems   Cardiovascular: Negative for chest pain, leg swelling and PND.   Gastrointestinal: Negative for abdominal pain, nausea and vomiting.   Neurological: Negative for headaches.   All other systems reviewed and are negative.       Vital Signs for last 24 hours   Temp:  [36.6 °C (97.8 °F)-36.7 °C (98.1 °F)] 36.6 °C (97.8 °F)  Pulse:  [68-92] 78  Resp:  [10-29] 12    Hemodynamic parameters for last 24 hours       Respiratory       Physical Exam   Physical Exam   Constitutional: He is oriented to person, place, and time. He appears well-developed and well-nourished.   Male sitting watching television no acute distress   HENT:   Head: Normocephalic.   Eyes: Pupils are equal, round, and reactive to light.   Neck: Normal range of motion. JVD present.   Cardiovascular: Normal rate and normal heart sounds.    Pulmonary/Chest: Effort normal and breath sounds normal. No respiratory distress. He has no wheezes. He has no rales.   Abdominal: He  exhibits no distension. There is no tenderness. There is no rebound and no guarding.   Neurological: He is alert and oriented to person, place, and time. No cranial nerve deficit.   Skin: Skin is warm and dry.   Psychiatric: He has a normal mood and affect. His behavior is normal.       Medications  Current Facility-Administered Medications   Medication Dose Route Frequency Provider Last Rate Last Dose   • lisinopril (PRINIVIL) 10 MG tablet 10 mg  10 mg Oral Q DAY Iban Dunlap M.D.   10 mg at 11/02/18 0835   • nitroglycerin (NITROSTAT) tablet 0.4 mg  0.4 mg Sublingual Q5 MIN PRN Jones Doran M.D.       • acetaminophen (TYLENOL) tablet 650 mg  650 mg Oral Q6HRS PRN Jatinder Alexis M.D.       • cloNIDine (CATAPRES) tablet 0.1 mg  0.1 mg Oral Q6HRS PRN Jatinder Alexis M.D.       • ondansetron (ZOFRAN) syringe/vial injection 4 mg  4 mg Intravenous Q4HRS PRN Jatinder Alexis M.D.       • ondansetron (ZOFRAN ODT) dispertab 4 mg  4 mg Oral Q4HRS PRN Jatinder Alexis M.D.       • promethazine (PHENERGAN) tablet 12.5-25 mg  12.5-25 mg Oral Q4HRS PRN Jatinder Alexis M.D.       • promethazine (PHENERGAN) suppository 12.5-25 mg  12.5-25 mg Rectal Q4HRS PRN Jatinder Alexis M.D.       • prochlorperazine (COMPAZINE) injection 5-10 mg  5-10 mg Intravenous Q4HRS PRN Jatinder Alexis M.D.       • senna-docusate (PERICOLACE or SENOKOT S) 8.6-50 MG per tablet 2 Tab  2 Tab Oral BID Jatinder Alexis M.D.   2 Tab at 11/02/18 0446    And   • polyethylene glycol/lytes (MIRALAX) PACKET 1 Packet  1 Packet Oral QDAY PRN Jatinder Alexis M.D.        And   • magnesium hydroxide (MILK OF MAGNESIA) suspension 30 mL  30 mL Oral QDAY PRN Jatinder Alexis M.D.        And   • bisacodyl (DULCOLAX) suppository 10 mg  10 mg Rectal QDAY PRN Jatinder Alexis M.D.       • metoprolol (LOPRESSOR) tablet 50 mg  50 mg Oral TWICE DAILY Iban Dunlap M.D.   50 mg at 11/02/18 0445   • atorvastatin (LIPITOR) tablet 40 mg  40 mg Oral QHS Iban Robles  ESTEFANI Dunlap   40 mg at 11/01/18 2041   • aspirin EC (ECOTRIN) tablet 81 mg  81 mg Oral DAILY Ilya Huffman M.D.   81 mg at 11/02/18 0445   • prasugrel (EFFIENT) tablet 60 mg  60 mg Oral Once Ilya Huffman M.D.   Stopped at 11/01/18 1600   • prasugrel (EFFIENT) tablet 10 mg  10 mg Oral DAILY Ilya Huffman M.D.   10 mg at 11/02/18 0445   • ALPRAZolam (XANAX) tablet 1 mg  1 mg Oral 4X/DAY PRN Himanshu Solano M.D.           Fluids    Intake/Output Summary (Last 24 hours) at 11/02/18 1121  Last data filed at 11/02/18 0800   Gross per 24 hour   Intake          1290.83 ml   Output                0 ml   Net          1290.83 ml       Laboratory      Recent Labs      11/01/18 0032 11/01/18   0420  11/01/18   0820   TROPONINI  0.54*  1.61*  9.59*   BNPBTYPENAT  234*   --    --      Recent Labs      11/01/18 0032 11/01/18   0820  11/02/18 0445   SODIUM  139   --   139   POTASSIUM  4.0   --   4.0   CHLORIDE  104   --   106   CO2  24   --   25   BUN  17   --   16   CREATININE  1.08   --   1.10   MAGNESIUM   --   1.9   --    CALCIUM  9.5   --   8.6     Recent Labs      11/01/18 0032  11/02/18 0445   ALTSGPT  19   --    ASTSGOT  19   --    ALKPHOSPHAT  60   --    TBILIRUBIN  0.6   --    LIPASE  4*   --    GLUCOSE  157*  137*     Recent Labs      11/01/18 0032 11/02/18   0445   WBC  15.0*  14.1*   NEUTSPOLYS  68.10  65.10   LYMPHOCYTES  25.00  26.00   MONOCYTES  4.70  6.80   EOSINOPHILS  1.10  1.00   BASOPHILS  0.70  0.60   ASTSGOT  19   --    ALTSGPT  19   --    ALKPHOSPHAT  60   --    TBILIRUBIN  0.6   --      Recent Labs      11/01/18 0032 11/02/18 0445   RBC  5.96  4.96   HEMOGLOBIN  16.9  14.1   HEMATOCRIT  50.2  42.6   PLATELETCT  427  305   PROTHROMBTM  13.8   --    APTT  30.7   --    INR  1.05   --        Imaging  X-Ray:  No film today    Assessment/Plan  * Non-STEMI (non-ST elevated myocardial infarction) (HCC)- (present on admission)   Assessment & Plan    Status post PCI 11/1 for left  circ occlusion  EF 50-55%    STEMI/NSTEMI:   Cardiology: Dr Huffman  Cath findings:  1.  100% occluded proximal dominant left circumflex coronary artery, thrombotic  2.  Patent mid and distal LAD stents with diffuse nonobstructive CAD throughout  3.  60% tandem RCA stenoses with diffuse distal small vessel CAD  4.  LVEF 50-55% preintervention  5.  Successful PCI LCx (3.0 x 33 mm Synergy BELINDA posted to 3.25 mm), excellent result DANAE-3 flow  Anticoagulation: asa, effient  Dual antiplatelet  Beta Blocker w/in 1st 24 hours unless contraindicated  High intensity Statin    Reduced EF: n/a  ACE/ARB  Spirnolactone/eplerenone    ECHO: Ef 50-55%    Also:   Tobacco sensation  BP goal < 140/90  Physical activity 30 mins 3-5x/wk  BMI   DM HgA1C<7  Flu vaccine  Avoid Oxygen therapy unless sat<92% (giving supplemental oxygen evidence showed larger infarct size)            Coronary artery disease involving native coronary artery of native heart with angina pectoris (HCC)   Assessment & Plan    Patient with 2 prior stents off all medication    Restart aspirin Plavix heparin for NSTEMI and potential concern for in-stent thrombosis    Cardiology consulted    Start beta-blockade        Class 1 obesity due to excess calories without serious comorbidity with body mass index (BMI) of 34.0 to 34.9 in adult   Assessment & Plan    Recommend diet modification        Type 2 diabetes mellitus with hyperglycemia, without long-term current use of insulin ([facility])   Assessment & Plan    Insulin sliding scale glycohemoglobin globin 7.8    Continue to monitor in the acute stress state of critical illness goal blood sugar below 180        Hypertensive emergency   Assessment & Plan    Patient with significant elevated blood pressure off medications with chest pain EKG changes and positive troponin    Nitroglycerin drip reduce systolic blood pressure by 10% in the first hour and another 20-25% in the next 24 hours    Well controlled off nitroglycerin  drip    Continue beta-blocker and ACE             VTE:  Not Indicated  Ulcer: Not Indicated  Lines: None    I have performed a physical exam and reviewed and updated ROS and Plan today (11/2/2018). In review of yesterday's note (11/1/2018), there are no changes except as documented above.     Discussed patient condition and risk of morbidity and/or mortality with Hospitalist, RN, Pharmacy, Charge nurse / hot rounds and Patient

## 2018-12-07 ENCOUNTER — HOSPITAL ENCOUNTER (INPATIENT)
Facility: MEDICAL CENTER | Age: 33
LOS: 3 days | DRG: 305 | End: 2018-12-10
Attending: EMERGENCY MEDICINE | Admitting: HOSPITALIST

## 2018-12-07 ENCOUNTER — APPOINTMENT (OUTPATIENT)
Dept: RADIOLOGY | Facility: MEDICAL CENTER | Age: 33
DRG: 305 | End: 2018-12-07
Attending: EMERGENCY MEDICINE

## 2018-12-07 DIAGNOSIS — I16.1 HYPERTENSIVE EMERGENCY: ICD-10-CM

## 2018-12-07 DIAGNOSIS — I25.119 CORONARY ARTERY DISEASE INVOLVING NATIVE CORONARY ARTERY OF NATIVE HEART WITH ANGINA PECTORIS (HCC): ICD-10-CM

## 2018-12-07 LAB
ALBUMIN SERPL BCP-MCNC: 4.1 G/DL (ref 3.2–4.9)
ALBUMIN/GLOB SERPL: 1.4 G/DL
ALP SERPL-CCNC: 59 U/L (ref 30–99)
ALT SERPL-CCNC: 15 U/L (ref 2–50)
ANION GAP SERPL CALC-SCNC: 10 MMOL/L (ref 0–11.9)
AST SERPL-CCNC: 18 U/L (ref 12–45)
BASOPHILS # BLD AUTO: 0.7 % (ref 0–1.8)
BASOPHILS # BLD: 0.08 K/UL (ref 0–0.12)
BILIRUB SERPL-MCNC: 0.4 MG/DL (ref 0.1–1.5)
BNP SERPL-MCNC: 189 PG/ML (ref 0–100)
BUN SERPL-MCNC: 11 MG/DL (ref 8–22)
CALCIUM SERPL-MCNC: 8.7 MG/DL (ref 8.5–10.5)
CHLORIDE SERPL-SCNC: 105 MMOL/L (ref 96–112)
CO2 SERPL-SCNC: 24 MMOL/L (ref 20–33)
CREAT SERPL-MCNC: 1.04 MG/DL (ref 0.5–1.4)
EKG IMPRESSION: NORMAL
EOSINOPHIL # BLD AUTO: 0.18 K/UL (ref 0–0.51)
EOSINOPHIL NFR BLD: 1.5 % (ref 0–6.9)
ERYTHROCYTE [DISTWIDTH] IN BLOOD BY AUTOMATED COUNT: 38.1 FL (ref 35.9–50)
GLOBULIN SER CALC-MCNC: 2.9 G/DL (ref 1.9–3.5)
GLUCOSE SERPL-MCNC: 191 MG/DL (ref 65–99)
HCT VFR BLD AUTO: 42.9 % (ref 42–52)
HGB BLD-MCNC: 14.6 G/DL (ref 14–18)
IMM GRANULOCYTES # BLD AUTO: 0.03 K/UL (ref 0–0.11)
IMM GRANULOCYTES NFR BLD AUTO: 0.3 % (ref 0–0.9)
LYMPHOCYTES # BLD AUTO: 3.7 K/UL (ref 1–4.8)
LYMPHOCYTES NFR BLD: 31.3 % (ref 22–41)
MCH RBC QN AUTO: 28.4 PG (ref 27–33)
MCHC RBC AUTO-ENTMCNC: 34 G/DL (ref 33.7–35.3)
MCV RBC AUTO: 83.5 FL (ref 81.4–97.8)
MONOCYTES # BLD AUTO: 0.66 K/UL (ref 0–0.85)
MONOCYTES NFR BLD AUTO: 5.6 % (ref 0–13.4)
NEUTROPHILS # BLD AUTO: 7.16 K/UL (ref 1.82–7.42)
NEUTROPHILS NFR BLD: 60.6 % (ref 44–72)
NRBC # BLD AUTO: 0 K/UL
NRBC BLD-RTO: 0 /100 WBC
PLATELET # BLD AUTO: 322 K/UL (ref 164–446)
PMV BLD AUTO: 9.5 FL (ref 9–12.9)
POTASSIUM SERPL-SCNC: 3.4 MMOL/L (ref 3.6–5.5)
PROT SERPL-MCNC: 7 G/DL (ref 6–8.2)
RBC # BLD AUTO: 5.14 M/UL (ref 4.7–6.1)
SODIUM SERPL-SCNC: 139 MMOL/L (ref 135–145)
T4 FREE SERPL-MCNC: 0.85 NG/DL (ref 0.53–1.43)
TROPONIN I SERPL-MCNC: 0.13 NG/ML (ref 0–0.04)
TSH SERPL DL<=0.005 MIU/L-ACNC: 1.13 UIU/ML (ref 0.38–5.33)
WBC # BLD AUTO: 11.8 K/UL (ref 4.8–10.8)

## 2018-12-07 PROCEDURE — 99223 1ST HOSP IP/OBS HIGH 75: CPT | Mod: 25 | Performed by: HOSPITALIST

## 2018-12-07 PROCEDURE — 770006 HCHG ROOM/CARE - MED/SURG/GYN SEMI*

## 2018-12-07 PROCEDURE — 84443 ASSAY THYROID STIM HORMONE: CPT

## 2018-12-07 PROCEDURE — 84484 ASSAY OF TROPONIN QUANT: CPT

## 2018-12-07 PROCEDURE — 84439 ASSAY OF FREE THYROXINE: CPT

## 2018-12-07 PROCEDURE — 99406 BEHAV CHNG SMOKING 3-10 MIN: CPT | Performed by: HOSPITALIST

## 2018-12-07 PROCEDURE — 99285 EMERGENCY DEPT VISIT HI MDM: CPT

## 2018-12-07 PROCEDURE — 71045 X-RAY EXAM CHEST 1 VIEW: CPT

## 2018-12-07 PROCEDURE — 83880 ASSAY OF NATRIURETIC PEPTIDE: CPT

## 2018-12-07 PROCEDURE — 700101 HCHG RX REV CODE 250: Performed by: EMERGENCY MEDICINE

## 2018-12-07 PROCEDURE — 93005 ELECTROCARDIOGRAM TRACING: CPT

## 2018-12-07 PROCEDURE — 96374 THER/PROPH/DIAG INJ IV PUSH: CPT

## 2018-12-07 PROCEDURE — 80053 COMPREHEN METABOLIC PANEL: CPT

## 2018-12-07 PROCEDURE — A9270 NON-COVERED ITEM OR SERVICE: HCPCS | Performed by: EMERGENCY MEDICINE

## 2018-12-07 PROCEDURE — 700102 HCHG RX REV CODE 250 W/ 637 OVERRIDE(OP): Performed by: EMERGENCY MEDICINE

## 2018-12-07 PROCEDURE — 85025 COMPLETE CBC W/AUTO DIFF WBC: CPT

## 2018-12-07 PROCEDURE — 93005 ELECTROCARDIOGRAM TRACING: CPT | Performed by: EMERGENCY MEDICINE

## 2018-12-07 PROCEDURE — 36415 COLL VENOUS BLD VENIPUNCTURE: CPT

## 2018-12-07 RX ORDER — BISACODYL 10 MG
10 SUPPOSITORY, RECTAL RECTAL
Status: DISCONTINUED | OUTPATIENT
Start: 2018-12-07 | End: 2018-12-10 | Stop reason: HOSPADM

## 2018-12-07 RX ORDER — NICOTINE 21 MG/24HR
21 PATCH, TRANSDERMAL 24 HOURS TRANSDERMAL
Status: DISCONTINUED | OUTPATIENT
Start: 2018-12-08 | End: 2018-12-10 | Stop reason: HOSPADM

## 2018-12-07 RX ORDER — AMOXICILLIN 250 MG
2 CAPSULE ORAL 2 TIMES DAILY
Status: DISCONTINUED | OUTPATIENT
Start: 2018-12-07 | End: 2018-12-10 | Stop reason: HOSPADM

## 2018-12-07 RX ORDER — AMLODIPINE BESYLATE 5 MG/1
5 TABLET ORAL DAILY
COMMUNITY
End: 2019-06-17

## 2018-12-07 RX ORDER — ATORVASTATIN CALCIUM 40 MG/1
40 TABLET, FILM COATED ORAL
Status: DISCONTINUED | OUTPATIENT
Start: 2018-12-07 | End: 2018-12-10 | Stop reason: HOSPADM

## 2018-12-07 RX ORDER — DEXTROSE MONOHYDRATE 25 G/50ML
25 INJECTION, SOLUTION INTRAVENOUS
Status: DISCONTINUED | OUTPATIENT
Start: 2018-12-07 | End: 2018-12-10 | Stop reason: HOSPADM

## 2018-12-07 RX ORDER — PRASUGREL 10 MG/1
10 TABLET, FILM COATED ORAL DAILY
Status: DISCONTINUED | OUTPATIENT
Start: 2018-12-08 | End: 2018-12-08

## 2018-12-07 RX ORDER — NITROGLYCERIN 0.4 MG/1
0.4 TABLET SUBLINGUAL
Status: DISCONTINUED | OUTPATIENT
Start: 2018-12-07 | End: 2018-12-10 | Stop reason: HOSPADM

## 2018-12-07 RX ORDER — HYDRALAZINE HYDROCHLORIDE 20 MG/ML
20 INJECTION INTRAMUSCULAR; INTRAVENOUS EVERY 6 HOURS PRN
Status: DISCONTINUED | OUTPATIENT
Start: 2018-12-07 | End: 2018-12-10 | Stop reason: HOSPADM

## 2018-12-07 RX ORDER — METOPROLOL TARTRATE 1 MG/ML
5 INJECTION, SOLUTION INTRAVENOUS
Status: DISCONTINUED | OUTPATIENT
Start: 2018-12-07 | End: 2018-12-10 | Stop reason: HOSPADM

## 2018-12-07 RX ORDER — METOPROLOL TARTRATE 50 MG/1
50 TABLET, FILM COATED ORAL 2 TIMES DAILY
Status: DISCONTINUED | OUTPATIENT
Start: 2018-12-07 | End: 2018-12-08

## 2018-12-07 RX ORDER — POLYETHYLENE GLYCOL 3350 17 G/17G
1 POWDER, FOR SOLUTION ORAL
Status: DISCONTINUED | OUTPATIENT
Start: 2018-12-07 | End: 2018-12-10 | Stop reason: HOSPADM

## 2018-12-07 RX ORDER — METOPROLOL TARTRATE 1 MG/ML
5 INJECTION, SOLUTION INTRAVENOUS ONCE
Status: COMPLETED | OUTPATIENT
Start: 2018-12-07 | End: 2018-12-07

## 2018-12-07 RX ORDER — MORPHINE SULFATE 10 MG/ML
2-4 INJECTION, SOLUTION INTRAMUSCULAR; INTRAVENOUS
Status: DISCONTINUED | OUTPATIENT
Start: 2018-12-07 | End: 2018-12-10 | Stop reason: HOSPADM

## 2018-12-07 RX ORDER — CLOPIDOGREL BISULFATE 75 MG/1
75 TABLET ORAL DAILY
Status: DISCONTINUED | OUTPATIENT
Start: 2018-12-08 | End: 2018-12-10 | Stop reason: HOSPADM

## 2018-12-07 RX ORDER — CLOPIDOGREL BISULFATE 75 MG/1
75 TABLET ORAL EVERY MORNING
COMMUNITY
End: 2019-06-17 | Stop reason: SDUPTHER

## 2018-12-07 RX ORDER — GLYBURIDE 5 MG/1
5 TABLET ORAL
COMMUNITY
End: 2019-08-30 | Stop reason: SDUPTHER

## 2018-12-07 RX ORDER — LISINOPRIL 10 MG/1
10 TABLET ORAL DAILY
Status: DISCONTINUED | OUTPATIENT
Start: 2018-12-08 | End: 2018-12-08

## 2018-12-07 RX ORDER — AMLODIPINE BESYLATE 5 MG/1
5 TABLET ORAL DAILY
Status: DISCONTINUED | OUTPATIENT
Start: 2018-12-08 | End: 2018-12-08

## 2018-12-07 RX ORDER — ASPIRIN 81 MG/1
324 TABLET, CHEWABLE ORAL ONCE
Status: COMPLETED | OUTPATIENT
Start: 2018-12-07 | End: 2018-12-07

## 2018-12-07 RX ADMIN — METOPROLOL TARTRATE 5 MG: 5 INJECTION INTRAVENOUS at 23:30

## 2018-12-07 RX ADMIN — ASPIRIN 324 MG: 81 TABLET, CHEWABLE ORAL at 23:18

## 2018-12-07 ASSESSMENT — PAIN SCALES - GENERAL: PAINLEVEL_OUTOF10: 0

## 2018-12-08 PROBLEM — Z72.0 TOBACCO ABUSE: Status: ACTIVE | Noted: 2018-12-08

## 2018-12-08 PROBLEM — E11.9 T2DM (TYPE 2 DIABETES MELLITUS) (HCC): Status: ACTIVE | Noted: 2018-12-08

## 2018-12-08 PROBLEM — Z91.199 MEDICAL NON-COMPLIANCE: Status: ACTIVE | Noted: 2018-12-08

## 2018-12-08 LAB
EKG IMPRESSION: NORMAL
ERYTHROCYTE [DISTWIDTH] IN BLOOD BY AUTOMATED COUNT: 38.1 FL (ref 35.9–50)
GLUCOSE BLD-MCNC: 108 MG/DL (ref 65–99)
GLUCOSE BLD-MCNC: 125 MG/DL (ref 65–99)
GLUCOSE BLD-MCNC: 141 MG/DL (ref 65–99)
GLUCOSE BLD-MCNC: 147 MG/DL (ref 65–99)
GLUCOSE BLD-MCNC: 205 MG/DL (ref 65–99)
HCT VFR BLD AUTO: 44.5 % (ref 42–52)
HGB BLD-MCNC: 14.9 G/DL (ref 14–18)
MCH RBC QN AUTO: 28.2 PG (ref 27–33)
MCHC RBC AUTO-ENTMCNC: 33.5 G/DL (ref 33.7–35.3)
MCV RBC AUTO: 84.3 FL (ref 81.4–97.8)
PLATELET # BLD AUTO: 329 K/UL (ref 164–446)
PMV BLD AUTO: 9.5 FL (ref 9–12.9)
RBC # BLD AUTO: 5.28 M/UL (ref 4.7–6.1)
TROPONIN I SERPL-MCNC: 0.14 NG/ML (ref 0–0.04)
TROPONIN I SERPL-MCNC: 0.16 NG/ML (ref 0–0.04)
WBC # BLD AUTO: 11.5 K/UL (ref 4.8–10.8)

## 2018-12-08 PROCEDURE — 99255 IP/OBS CONSLTJ NEW/EST HI 80: CPT | Mod: 25 | Performed by: INTERNAL MEDICINE

## 2018-12-08 PROCEDURE — 93010 ELECTROCARDIOGRAM REPORT: CPT | Performed by: INTERNAL MEDICINE

## 2018-12-08 PROCEDURE — 82962 GLUCOSE BLOOD TEST: CPT

## 2018-12-08 PROCEDURE — 700102 HCHG RX REV CODE 250 W/ 637 OVERRIDE(OP): Performed by: INTERNAL MEDICINE

## 2018-12-08 PROCEDURE — 93005 ELECTROCARDIOGRAM TRACING: CPT | Performed by: HOSPITALIST

## 2018-12-08 PROCEDURE — 99406 BEHAV CHNG SMOKING 3-10 MIN: CPT | Performed by: INTERNAL MEDICINE

## 2018-12-08 PROCEDURE — 84484 ASSAY OF TROPONIN QUANT: CPT

## 2018-12-08 PROCEDURE — A9270 NON-COVERED ITEM OR SERVICE: HCPCS | Performed by: HOSPITALIST

## 2018-12-08 PROCEDURE — 99232 SBSQ HOSP IP/OBS MODERATE 35: CPT | Performed by: INTERNAL MEDICINE

## 2018-12-08 PROCEDURE — 85027 COMPLETE CBC AUTOMATED: CPT

## 2018-12-08 PROCEDURE — 770020 HCHG ROOM/CARE - TELE (206)

## 2018-12-08 PROCEDURE — 36415 COLL VENOUS BLD VENIPUNCTURE: CPT

## 2018-12-08 PROCEDURE — A9270 NON-COVERED ITEM OR SERVICE: HCPCS | Performed by: INTERNAL MEDICINE

## 2018-12-08 PROCEDURE — 700102 HCHG RX REV CODE 250 W/ 637 OVERRIDE(OP): Performed by: HOSPITALIST

## 2018-12-08 PROCEDURE — 700111 HCHG RX REV CODE 636 W/ 250 OVERRIDE (IP): Performed by: HOSPITALIST

## 2018-12-08 RX ORDER — POTASSIUM CHLORIDE 20 MEQ/1
40 TABLET, EXTENDED RELEASE ORAL DAILY
Status: COMPLETED | OUTPATIENT
Start: 2018-12-08 | End: 2018-12-10

## 2018-12-08 RX ORDER — AMLODIPINE BESYLATE 10 MG/1
10 TABLET ORAL DAILY
Status: DISCONTINUED | OUTPATIENT
Start: 2018-12-09 | End: 2018-12-10 | Stop reason: HOSPADM

## 2018-12-08 RX ORDER — METOPROLOL SUCCINATE 100 MG/1
100 TABLET, EXTENDED RELEASE ORAL DAILY
COMMUNITY
End: 2019-06-17

## 2018-12-08 RX ORDER — LISINOPRIL 20 MG/1
40 TABLET ORAL DAILY
Status: DISCONTINUED | OUTPATIENT
Start: 2018-12-09 | End: 2018-12-10 | Stop reason: HOSPADM

## 2018-12-08 RX ORDER — ATORVASTATIN CALCIUM 40 MG/1
40 TABLET, FILM COATED ORAL EVERY MORNING
COMMUNITY
End: 2019-06-17 | Stop reason: SDUPTHER

## 2018-12-08 RX ORDER — METOPROLOL TARTRATE 50 MG/1
100 TABLET, FILM COATED ORAL 2 TIMES DAILY
Status: DISCONTINUED | OUTPATIENT
Start: 2018-12-08 | End: 2018-12-10 | Stop reason: HOSPADM

## 2018-12-08 RX ORDER — LISINOPRIL 10 MG/1
20 TABLET ORAL DAILY
COMMUNITY
End: 2019-02-04

## 2018-12-08 RX ADMIN — HYDRALAZINE HYDROCHLORIDE 20 MG: 20 INJECTION INTRAMUSCULAR; INTRAVENOUS at 23:53

## 2018-12-08 RX ADMIN — METOPROLOL TARTRATE 100 MG: 50 TABLET ORAL at 16:51

## 2018-12-08 RX ADMIN — AMLODIPINE BESYLATE 5 MG: 5 TABLET ORAL at 05:24

## 2018-12-08 RX ADMIN — LISINOPRIL 10 MG: 10 TABLET ORAL at 05:24

## 2018-12-08 RX ADMIN — POTASSIUM CHLORIDE 40 MEQ: 1500 TABLET, EXTENDED RELEASE ORAL at 16:51

## 2018-12-08 RX ADMIN — ASPIRIN 81 MG: 81 TABLET, COATED ORAL at 05:24

## 2018-12-08 RX ADMIN — ATORVASTATIN CALCIUM 40 MG: 40 TABLET, FILM COATED ORAL at 00:29

## 2018-12-08 RX ADMIN — METOPROLOL TARTRATE 50 MG: 50 TABLET ORAL at 00:29

## 2018-12-08 RX ADMIN — HYDRALAZINE HYDROCHLORIDE 20 MG: 20 INJECTION INTRAMUSCULAR; INTRAVENOUS at 00:30

## 2018-12-08 RX ADMIN — INSULIN HUMAN 3 UNITS: 100 INJECTION, SOLUTION PARENTERAL at 12:12

## 2018-12-08 RX ADMIN — CLOPIDOGREL 75 MG: 75 TABLET, FILM COATED ORAL at 05:23

## 2018-12-08 RX ADMIN — ATORVASTATIN CALCIUM 40 MG: 40 TABLET, FILM COATED ORAL at 20:41

## 2018-12-08 RX ADMIN — METOPROLOL TARTRATE 50 MG: 50 TABLET ORAL at 05:24

## 2018-12-08 ASSESSMENT — COPD QUESTIONNAIRES
HAVE YOU SMOKED AT LEAST 100 CIGARETTES IN YOUR ENTIRE LIFE: NO/DON'T KNOW
DURING THE PAST 4 WEEKS HOW MUCH DID YOU FEEL SHORT OF BREATH: NONE/LITTLE OF THE TIME
DO YOU EVER COUGH UP ANY MUCUS OR PHLEGM?: NO/ONLY WITH OCCASIONAL COLDS OR INFECTIONS
IN THE PAST 12 MONTHS DO YOU DO LESS THAN YOU USED TO BECAUSE OF YOUR BREATHING PROBLEMS: DISAGREE/UNSURE

## 2018-12-08 ASSESSMENT — PAIN SCALES - GENERAL
PAINLEVEL_OUTOF10: 0

## 2018-12-08 ASSESSMENT — ENCOUNTER SYMPTOMS
HEADACHES: 0
FEVER: 0
DEPRESSION: 0
PALPITATIONS: 1
SORE THROAT: 0
WHEEZING: 0
COUGH: 0
DIARRHEA: 0
TINGLING: 0
CHILLS: 0
BLURRED VISION: 0
ORTHOPNEA: 0
ABDOMINAL PAIN: 0
VOMITING: 0
MYALGIAS: 0
NAUSEA: 0
SPUTUM PRODUCTION: 0
DOUBLE VISION: 0
DIZZINESS: 0
FOCAL WEAKNESS: 0
PHOTOPHOBIA: 0
SHORTNESS OF BREATH: 0

## 2018-12-08 ASSESSMENT — LIFESTYLE VARIABLES
HOW MANY TIMES IN THE PAST YEAR HAVE YOU HAD 5 OR MORE DRINKS IN A DAY: 0
TOTAL SCORE: 0
TOTAL SCORE: 0
ON A TYPICAL DAY WHEN YOU DRINK ALCOHOL HOW MANY DRINKS DO YOU HAVE: 2
CONSUMPTION TOTAL: NEGATIVE
EVER_SMOKED: YES
HAVE YOU EVER FELT YOU SHOULD CUT DOWN ON YOUR DRINKING: NO
EVER HAD A DRINK FIRST THING IN THE MORNING TO STEADY YOUR NERVES TO GET RID OF A HANGOVER: NO
ALCOHOL_USE: YES
EVER FELT BAD OR GUILTY ABOUT YOUR DRINKING: NO
AVERAGE NUMBER OF DAYS PER WEEK YOU HAVE A DRINK CONTAINING ALCOHOL: 1
HAVE PEOPLE ANNOYED YOU BY CRITICIZING YOUR DRINKING: NO
TOTAL SCORE: 0

## 2018-12-08 ASSESSMENT — COGNITIVE AND FUNCTIONAL STATUS - GENERAL
MOBILITY SCORE: 24
DAILY ACTIVITIY SCORE: 24
SUGGESTED CMS G CODE MODIFIER MOBILITY: CH
SUGGESTED CMS G CODE MODIFIER DAILY ACTIVITY: CH

## 2018-12-08 ASSESSMENT — PATIENT HEALTH QUESTIONNAIRE - PHQ9
2. FEELING DOWN, DEPRESSED, IRRITABLE, OR HOPELESS: NOT AT ALL
1. LITTLE INTEREST OR PLEASURE IN DOING THINGS: NOT AT ALL
SUM OF ALL RESPONSES TO PHQ9 QUESTIONS 1 AND 2: 0
2. FEELING DOWN, DEPRESSED, IRRITABLE, OR HOPELESS: NOT AT ALL
SUM OF ALL RESPONSES TO PHQ9 QUESTIONS 1 AND 2: 0
1. LITTLE INTEREST OR PLEASURE IN DOING THINGS: NOT AT ALL

## 2018-12-08 NOTE — PROGRESS NOTES
Received pt from ED. Pt transported from ED with this RN on zoll monitor to room T728-1. Bedside report completed. Pt lying in bed comfortably.  A/O x 4, pain 0/10. Safety precautions in place. Pt oriented to call light and room. Call light and personal belongings within reach. Pt educated on POC and all questions answered at this time.  Educated patient on calling for assistance when needed. All pt needs are met at this time.  Will continue to monitor.

## 2018-12-08 NOTE — ASSESSMENT & PLAN NOTE
Was here  approximately 1 month ago and diagnosed with NSTEMI, he underwent cardiac catheterization at that time and left circumflex stent placement.  It was recommended that he stay on dual antiplatelet therapy and had been sent home with prescriptions on a medically optimized regimen.   Has not been taking them  Restarting ,  home Plavix, aspirin, and Lipitor  Cardiology also consulted appreciate rec.

## 2018-12-08 NOTE — ED NOTES
Med Rec Updated and Complete per Pt at bedside with Rx Bottles (returned)  Allergies Reviewed  No PO ABX last 30 days    Pt states he takes Plavix 75mg every morning with his last dose 12/06/18 AM.

## 2018-12-08 NOTE — ED NOTES
Pt medicated per MAR. Nitro will be held until 15 min post Lopressor injection and administered for BP >180

## 2018-12-08 NOTE — ASSESSMENT & PLAN NOTE
Secondary to medical noncompliance,   resume home Norvasc, Prinivil, metoprolol, and add IV hydralazine for breakthrough.    Monitor

## 2018-12-08 NOTE — ED PROVIDER NOTES
ED Provider Note    CHIEF COMPLAINT  Chief Complaint   Patient presents with   • Palpitations       [facility]  Mina Hickey Sp Burch is a 33 y.o. male with a history of CAD, diabetes, history of medication noncompliance who presents with palpitations since at around 4:30 AM.  No actual chest pain or trouble breathing.  Denies any exertional chest pain.  No orthopnea or PND.  Denies any substance abuse history.  Has significant family history with regards to cardiovascular disease at a young age with both parents having cardiovascular issues at a young age.  Has had reported heart attacks in the past.  Multiple stents.  2 cardiac caths.  One in [place] and one here in [place] in November.  He has been unable to afford the prescribed medications by cardiology here following his recent catheterization procedure and so has been continuing with his medications prescribed from his physician in [place].  Does take Plavix.  He states that he tends to forget his medications often however.  States that in the mornings he tends to remember his medications however gets in the afternoons.  Patient has a smoking history.  No nausea or vomiting.  No diaphoresis.  No fevers or cough.  Denies any substance abuse such as methamphetamines.      REVIEW OF SYSTEMS  See HPI for further details. All other systems are negative.     PAST MEDICAL HISTORY   has a past medical history of Diabetes (HCC); Hypertension; and Myocardial infarct (HCC).    SOCIAL HISTORY  Social History     Social History Main Topics   • Smoking status: Current Every Day Smoker     Packs/day: 0.50     Types: Cigarettes   • Smokeless tobacco: Current User     Types: Chew   • Alcohol use Yes      Comment: occas   • Drug use: No   • Sexual activity: Not on file       SURGICAL HISTORY   has a past surgical history that includes other cardiac surgery (11/2018) and other cardiac surgery (09/18/2017).    CURRENT MEDICATIONS  Home Medications    **Home medications have not  "yet been reviewed for this encounter**         ALLERGIES  No Known Allergies    PHYSICAL EXAM  VITAL SIGNS: BP (!) 198/134   Pulse (!) 105   Temp 36.9 °C (98.4 °F) (Temporal)   Resp 20   Ht 1.626 m (5' 4\")   Wt 95.5 kg (210 lb 8.6 oz)   SpO2 97%   BMI 36.14 kg/m²   Pulse ox interpretation: I interpret this pulse ox as normal.  Constitutional: Alert in no apparent distress.  HENT: No signs of trauma, Bilateral external ears normal, Nose normal.   Eyes: Pupils are equal and reactive, Conjunctiva normal, Non-icteric.   Neck: Normal range of motion, No tenderness, Supple, No stridor.   Cardiovascular: Tachycardic rate and regular rhythm, no murmurs.   Thorax & Lungs: Normal breath sounds, No respiratory distress, No wheezing, No chest tenderness.   Abdomen: Bowel sounds normal, Soft, No tenderness, No masses, No pulsatile masses. No peritoneal signs.  Skin: Warm, Dry, No erythema, No rash.   Back: No bony tenderness, No CVA tenderness.   Extremities: Intact distal pulses, No edema, No tenderness, No cyanosis  Musculoskeletal: Good range of motion in all major joints. No tenderness to palpation or major deformities noted.   Neurologic: Alert , Normal motor function and gait, Normal sensory function, No focal deficits noted.   Psychiatric: Affect normal, Judgment normal, Mood normal.       DIAGNOSTIC STUDIES / PROCEDURES    EKG  12/7/2018 at 9:20 PM  Sinus tachycardia 108  OR, QRS, QTC within normal limits  Normal axis  T wave inversions in V6, lead II, aVL  Compared to prior EKG on 11/1/2018, previous EKG showed multiple T wave inversions in V4, V5, V6, lead  I, II, aVL    LABS  Labs Reviewed   CBC WITH DIFFERENTIAL - Abnormal; Notable for the following:        Result Value    WBC 11.8 (*)     All other components within normal limits   COMP METABOLIC PANEL - Abnormal; Notable for the following:     Potassium 3.4 (*)     Glucose 191 (*)     All other components within normal limits   TROPONIN - Abnormal; Notable " for the following:     Troponin I 0.13 (*)     All other components within normal limits   BTYPE NATRIURETIC PEPTIDE - Abnormal; Notable for the following:     B Natriuretic Peptide 189 (*)     All other components within normal limits   TROPONIN - Abnormal; Notable for the following:     Troponin I 0.16 (*)     All other components within normal limits   CBC WITHOUT DIFFERENTIAL - Abnormal; Notable for the following:     WBC 11.5 (*)     MCHC 33.5 (*)     All other components within normal limits   TROPONIN - Abnormal; Notable for the following:     Troponin I 0.14 (*)     All other components within normal limits   ACCU-CHEK GLUCOSE - Abnormal; Notable for the following:     Glucose - Accu-Ck 141 (*)     All other components within normal limits   ACCU-CHEK GLUCOSE - Abnormal; Notable for the following:     Glucose - Accu-Ck 147 (*)     All other components within normal limits   ACCU-CHEK GLUCOSE - Abnormal; Notable for the following:     Glucose - Accu-Ck 205 (*)     All other components within normal limits   TSH   FREE THYROXINE   ESTIMATED GFR       RADIOLOGY  DX-CHEST-PORTABLE (1 VIEW)   Final Result      No acute cardiopulmonary disease evident.          COURSE & MEDICAL DECISION MAKING  Pertinent Labs & Imaging studies reviewed. (See chart for details)  33 y.o. male presenting with palpitations.  Has a known history of CAD at a young age with multiple stents.  Has been noncompliant with his medications and often forgets to take his medicines.  Continues to smoke cigarettes.  Has a history of diabetes and hypertension.  Denies any nor orthopnea, PND, lower extremity swelling, shortness of breath.  No chest pain.  No vomiting or diaphoresis.  No syncope.  He is rather hypertensive upon arrival.    Laboratory studies were performed.  He was found to have slightly elevated troponin to 0.13.  Last time he was admitted to the hospital in November, had catheterization procedure with stent placement.  Had elevated  troponin at that time.  No history of kidney disease or renal failure.  No signs of sepsis or PE.  No substance abuse history.    Concern for NSTEMI and the patient was given aspirin.  EKG showing normalization of T wave abnormalities from prior EKG though does still have some T wave abnormalities in the lateral leads.  Given the patient's noncompliance, cannot rule out  thrombosis of the patient's prior stents the patient does not have active chest pain at this time and troponin is only mildly elevated.  There is possibility of hypertensive emergency as well given his high blood pressure with a history of medication noncompliance.  He was given a dose of metoprolol.    11:13 PM spoke with the hospitalist, Dr. Lees, who agrees to the admission.    Results were reviewed with the patient.  He agrees to the admission.    The total critical care time on this patient is 35 minutes, resuscitating patient, speaking with admitting physician, and deciphering test results. This 35 minutes is exclusive of separately billable procedures.      FINAL IMPRESSION  Palpitations  NSTEMI  Troponin elevation  CAD  Hypertension, poorly controlled      Electronically signed by: Suresh Powers, 12/7/2018 9:54 PM

## 2018-12-08 NOTE — PROGRESS NOTES
Hospital Medicine Daily Progress Note    Date of Service  12/8/2018    Chief Complaint  33 y.o. male admitted 12/7/2018 with palpitation and elevated trop     Hospital Course    This is a 33 y.o. male who presented on 12/7/2018 with palpitations. Patient is known to the hospitalist services and was admitted to our facility approximately 1 month ago for NSTEMI.  He has a known history of coronary artery disease and had previous cardiac stents placed at an outside facility.  During his last hospitalization, he underwent cardiac catheterization with 100% occlusion of the left circumflex noted and subsequently required PCI.  He had been medically optimized and discharged home with prescription for all of his medications but he states that when he went to pick them up, he could not afford many of them. Here he was found to have elevated trop. And also uncontrolled  BP. Cardiology has been consulted appreciate rec.        Interval Problem Update  Pt seen and examined, no acute events overnight, cardiology has been consulted appreciate rec.     Consultants/Specialty  Cardiology: Dr. Stallworth     Code Status  Full code     Disposition  Home when medically cleared     Review of Systems  Review of Systems   Constitutional: Negative for chills and fever.   HENT: Negative for congestion and ear discharge.    Eyes: Negative for blurred vision and double vision.   Respiratory: Negative for cough and sputum production.    Cardiovascular: Positive for palpitations. Negative for orthopnea.   Gastrointestinal: Negative for nausea and vomiting.   Genitourinary: Negative for dysuria and urgency.   Skin: Negative for rash.        Physical Exam  Temp:  [36.2 °C (97.1 °F)-36.9 °C (98.4 °F)] 36.7 °C (98 °F)  Pulse:  [] 76  Resp:  [14-20] 17  BP: (129-214)/() 163/114    Physical Exam   Constitutional: He is oriented to person, place, and time.   HENT:   Head: Normocephalic and atraumatic.   Eyes: Conjunctivae are normal. No scleral  icterus.   Neck: Neck supple. No JVD present.   Cardiovascular: Normal rate.  Exam reveals no gallop.    Pulmonary/Chest: He has no wheezes. He has no rales.   Abdominal: Soft. Bowel sounds are normal. He exhibits no distension. There is no tenderness. There is no rebound.   Musculoskeletal: He exhibits no edema.   Neurological: He is alert and oriented to person, place, and time.   Skin: Skin is warm and dry.   Nursing note and vitals reviewed.      Fluids  No intake or output data in the 24 hours ending 12/08/18 1523    Laboratory  Recent Labs      12/07/18   2211  12/08/18   0338   WBC  11.8*  11.5*   RBC  5.14  5.28   HEMOGLOBIN  14.6  14.9   HEMATOCRIT  42.9  44.5   MCV  83.5  84.3   MCH  28.4  28.2   MCHC  34.0  33.5*   RDW  38.1  38.1   PLATELETCT  322  329   MPV  9.5  9.5     Recent Labs      12/07/18   2211   SODIUM  139   POTASSIUM  3.4*   CHLORIDE  105   CO2  24   GLUCOSE  191*   BUN  11   CREATININE  1.04   CALCIUM  8.7         Recent Labs      12/07/18   2211   BNPBTYPENAT  189*           Imaging  DX-CHEST-PORTABLE (1 VIEW)   Final Result      No acute cardiopulmonary disease evident.           Assessment/Plan  * Non-STEMI (non-ST elevated myocardial infarction) (HCC)- (present on admission)   Assessment & Plan    Was here  approximately 1 month ago and diagnosed with NSTEMI, he underwent cardiac catheterization at that time and left circumflex stent placement.  It was recommended that he stay on dual antiplatelet therapy and had been sent home with prescriptions on a medically optimized regimen.   Has not been taking them  Restarting ,  home Plavix, aspirin, and Lipitor  Cardiology also consulted appreciate rec.      Coronary artery disease involving native coronary artery of native heart with angina pectoris (HCC)- (present on admission)   Assessment & Plan    Treatment as noted above.     Hypertensive emergency- (present on admission)   Assessment & Plan    Secondary to medical noncompliance,    resume home Norvasc, Prinivil, metoprolol, and add IV hydralazine for breakthrough.    Monitor      Medical non-compliance   Assessment & Plan    Counseled at bedside, this has been an ongoing issue for this patient.   Sw worker also helping with his medication regarding financial issues      T2DM (type 2 diabetes mellitus) ([facility])   Assessment & Plan    Hold metformin   Cont on  Accu-Cheks and cover with insulin sliding scale.     Tobacco abuse   Assessment & Plan    Counseled on cessation           VTE prophylaxis: heparin

## 2018-12-08 NOTE — PROGRESS NOTES
Pt's medications taken to pharmacy by ANA Duenas from ED. Receipt number: [id number]  Pt's pharmacy label placed in pt chart.     Pt's chewing tobacco placed in pt drawer with pt sticker.     Pt aware of where medications are as well as chewing tobacco and is aware to ask for them when discharging.

## 2018-12-08 NOTE — H&P
"Hospital Medicine History & Physical Note    Date of Service  12/7/2018    Primary Care Physician  Pcp Pt States None    Consultants  None    Code Status  Full    Chief Complaint  Chief Complaint   Patient presents with   • Palpitations       History of Presenting Illness  33 y.o. male who presented on 12/7/2018 with palpitations since yesterday.  Patient is known to the hospitalist services and was admitted to our facility approximately 1 month ago for NSTEMI.  He has a known history of coronary artery disease and had previous cardiac stents placed at an outside facility.  During his last hospitalization, he underwent cardiac catheterization with 100% occlusion of the left circumflex noted and subsequently required PCI.  He had been medically optimized and discharged home with prescription for all of his medications but he states that when he went to pick them up, he could not afford many of them.  Therefore, he has only been taking Plavix and aspirin in addition to his antihypertensives.  The patient admits that he does not take his medications regularly and only \"when I remember\".  Unfortunately, the patient continues to smoke.  He denies any fevers, chills, chest pain, shortness of breath, abdominal pain, diarrhea, dysuria, or diaphoresis.      Review of Systems  Review of Systems   Constitutional: Negative for chills and fever.   HENT: Negative for congestion and sore throat.    Eyes: Negative for photophobia.   Respiratory: Negative for cough, shortness of breath and wheezing.    Cardiovascular: Positive for palpitations. Negative for chest pain.   Gastrointestinal: Negative for abdominal pain, diarrhea, nausea and vomiting.   Genitourinary: Negative for dysuria.   Musculoskeletal: Negative for myalgias.   Skin: Negative.    Neurological: Negative for dizziness, tingling, focal weakness and headaches.   Psychiatric/Behavioral: Negative for depression and suicidal ideas.       Past Medical History  Past Medical " History:   Diagnosis Date   • Myocardial infarct (HCC) 2018    1 stint place   • MI (myocardial infarction) (HCC) 2017    x2 stints placed   • Diabetes (HCC)    • Hypertension        Surgical History  Past Surgical History:   Procedure Laterality Date   • OTHER CARDIAC SURGERY  2018    1 stent placed   • OTHER CARDIAC SURGERY  2017    2 stents       Family History  Family History   Problem Relation Age of Onset   • Heart Disease Father    • Hypertension Father    • Heart Attack Father 45   • Diabetes Mother    • Hypertension Mother        Social History  Social History   Substance Use Topics   • Smoking status: Current Every Day Smoker     Packs/day: 0.50     Types: Cigarettes   • Smokeless tobacco: Current User     Types: Chew   • Alcohol use Yes      Comment: occas       Allergies  No Known Allergies    Medications  No current facility-administered medications on file prior to encounter.      Current Outpatient Prescriptions on File Prior to Encounter   Medication Sig Dispense Refill   • aspirin EC 81 MG EC tablet Take 1 Tab by mouth every day. 30 Tab    • atorvastatin (LIPITOR) 40 MG Tab Take 1 Tab by mouth every bedtime. 30 Tab 1   • lisinopril (PRINIVIL) 10 MG Tab Take 1 Tab by mouth every day. 30 Tab 0   • metoprolol (LOPRESSOR) 50 MG Tab Take 1 Tab by mouth 2 Times a Day. 60 Tab 1   • nitroglycerin (NITROSTAT) 0.4 MG SL Tab Place 1 Tab under tongue as needed for Chest Pain. 25 Tab 0   • prasugrel (EFFIENT) 10 MG Tab Take 1 Tab by mouth every day. 30 Tab 0   • metFORMIN (GLUCOPHAGE) 500 MG Tab Take 1 Tab by mouth 2 times a day, with meals. 60 Tab 0       Physical Exam  Hemodynamics  Temp (24hrs), Av.9 °C (98.4 °F), Min:36.9 °C (98.4 °F), Max:36.9 °C (98.4 °F)   Temperature: 36.9 °C (98.4 °F)  Pulse  Av.6  Min: 96  Max: 118    Blood Pressure: (!) 198/134, NIBP: (!) 190/108      Respiratory      Respiration: 20, Pulse Oximetry: 92 %             Physical Exam   Constitutional: He is oriented  to person, place, and time. No distress.   HENT:   Head: Normocephalic and atraumatic.   Right Ear: External ear normal.   Left Ear: External ear normal.   Eyes: EOM are normal. Right eye exhibits no discharge. Left eye exhibits no discharge.   Neck: Neck supple. No JVD present.   Cardiovascular: Normal rate, regular rhythm and normal heart sounds.    Pulmonary/Chest: Effort normal and breath sounds normal. No respiratory distress. He exhibits no tenderness.   Abdominal: Soft. Bowel sounds are normal. He exhibits no distension. There is no tenderness.   Musculoskeletal: He exhibits no edema.   Neurological: He is alert and oriented to person, place, and time. No cranial nerve deficit.   Skin: Skin is dry. He is not diaphoretic. No erythema.   Psychiatric: He has a normal mood and affect. His behavior is normal.   Nursing note and vitals reviewed.    Capillary refill less than 3 seconds, distal pulses intact    Laboratory:  Recent Labs      12/07/18   2211   WBC  11.8*   RBC  5.14   HEMOGLOBIN  14.6   HEMATOCRIT  42.9   MCV  83.5   MCH  28.4   MCHC  34.0   RDW  38.1   PLATELETCT  322   MPV  9.5         No results for input(s): ALTSGPT, ASTSGOT, ALKPHOSPHAT, TBILIRUBIN, DBILIRUBIN, GAMMAGT, AMYLASE, LIPASE, ALB, PREALBUMIN, GLUCOSE in the last 72 hours.              Lab Results   Component Value Date    TROPONINI 0.13 (H) 12/07/2018       Imaging  Dx-chest-portable (1 View)    Result Date: 12/7/2018 12/7/2018 10:28 PM HISTORY/REASON FOR EXAM:  Chest Pain. TECHNIQUE/EXAM DESCRIPTION AND NUMBER OF VIEWS: Single portable view of the chest. COMPARISON: 11/11/2018. FINDINGS: The soft tissues and bony structures are unremarkable. The heart and mediastinal structures are within normal limits. Pulmonary vascularity is normal. The lung fields are clear. There is no effusion or pneumothorax.     No acute cardiopulmonary disease evident.        Assessment/Plan:  Anticipate that patient will need greater than 2 midnights for  management of the discussed medical issues.    * Non-STEMI (non-ST elevated myocardial infarction) ([facility])- (present on admission)   Assessment & Plan    Patient was seen in our hospital approximately 1 month ago and diagnosed with NSTEMI, he underwent cardiac catheterization at that time and left circumflex stent placement.  It was recommended that he stay on dual antiplatelet therapy and had been sent home with prescriptions on a medically optimized regimen.  Unfortunately, he has been medically compliant with his medications both from a behavioral standpoint and because of financial difficulties.  At this point, he denies chest pain but complains of palpitations.  I am going to resume his Effient, continue home Plavix, aspirin, and Lipitor.  I will monitor him on telemetry and continue to trend troponin levels as well as obtain serial EKGs.  I will control his blood pressure.  We will consider a cardiology consultation in the morning.     Coronary artery disease involving native coronary artery of native heart with angina pectoris ([facility])- (present on admission)   Assessment & Plan    Treatment as noted above.     Hypertensive emergency- (present on admission)   Assessment & Plan    Secondary to medical noncompliance, resume home Norvasc, Prinivil, metoprolol, and add IV hydralazine for breakthrough.  May need to adjust home medications if remains elevated after re-initiation of home medications.     Medical non-compliance   Assessment & Plan    Counseled at bedside, this has been an ongoing issue for this patient.  Consider social work consultation prior to discharge to see if we can provide any assistance with financial counseling and affordability of medications.     T2DM (type 2 diabetes mellitus) ([facility])   Assessment & Plan    Holding oral hypoglycemics for now, monitor with Accu-Cheks and cover with insulin sliding scale.     Tobacco abuse   Assessment & Plan    This patient continues to smoke cigarettes. 3  minutes were spent counseling the patient in cessation techniques. Patient understands smoking increases risk factors for stroke and death. Patient is open to counseling.  The benefits of stopping were presented and nicotine replacement has been ordered to assist with withdrawal from nicotine during hospitalization and we will continue to encourage cessation and discuss other supportive resources including community groups and prescription medications.         Prophylaxis: Sequential compression devices for DVT prophylaxis, no PPI indicated, bowel protocol as needed

## 2018-12-08 NOTE — CARE PLAN
Problem: Communication  Goal: The ability to communicate needs accurately and effectively will improve    Intervention: [place] patient and significant other/support system to call light to alert staff of needs  Pt AOx4

## 2018-12-08 NOTE — ASSESSMENT & PLAN NOTE
Counseled at bedside, this has been an ongoing issue for this patient.   Sw worker also helping with his medication regarding financial issues

## 2018-12-08 NOTE — PROGRESS NOTES
MD Dr. Lees paged at 0637. No Call back received at 0650. MD Dr. Eisenberg paged at 0650. Call back received at 0651.    MD Dr. Eisenberg updated on pt's QTc increasing from 488 from EKG at 2120 to a QTc of 522 from EKG at 0625. No new orders at this time. MD stated to pass off to day shift team and continue to monitor.

## 2018-12-08 NOTE — PROGRESS NOTES
MD Dr. Lees called back at 0657. MD notified of pt's increasing prolonged QTc. MD stated she would let Dr. Gutierrez know at this time. No new orders at this time.

## 2018-12-09 PROBLEM — R79.89 ELEVATED TROPONIN: Status: ACTIVE | Noted: 2018-12-09

## 2018-12-09 LAB
ANION GAP SERPL CALC-SCNC: 7 MMOL/L (ref 0–11.9)
BUN SERPL-MCNC: 17 MG/DL (ref 8–22)
CALCIUM SERPL-MCNC: 9.7 MG/DL (ref 8.5–10.5)
CHLORIDE SERPL-SCNC: 109 MMOL/L (ref 96–112)
CO2 SERPL-SCNC: 22 MMOL/L (ref 20–33)
CREAT SERPL-MCNC: 1.15 MG/DL (ref 0.5–1.4)
ERYTHROCYTE [DISTWIDTH] IN BLOOD BY AUTOMATED COUNT: 39.8 FL (ref 35.9–50)
GLUCOSE BLD-MCNC: 118 MG/DL (ref 65–99)
GLUCOSE BLD-MCNC: 136 MG/DL (ref 65–99)
GLUCOSE BLD-MCNC: 159 MG/DL (ref 65–99)
GLUCOSE BLD-MCNC: 194 MG/DL (ref 65–99)
GLUCOSE SERPL-MCNC: 142 MG/DL (ref 65–99)
HCT VFR BLD AUTO: 47.8 % (ref 42–52)
HGB BLD-MCNC: 15.7 G/DL (ref 14–18)
MAGNESIUM SERPL-MCNC: 2 MG/DL (ref 1.5–2.5)
MCH RBC QN AUTO: 28 PG (ref 27–33)
MCHC RBC AUTO-ENTMCNC: 32.8 G/DL (ref 33.7–35.3)
MCV RBC AUTO: 85.2 FL (ref 81.4–97.8)
PLATELET # BLD AUTO: 351 K/UL (ref 164–446)
PMV BLD AUTO: 9.6 FL (ref 9–12.9)
POTASSIUM SERPL-SCNC: 4.2 MMOL/L (ref 3.6–5.5)
RBC # BLD AUTO: 5.61 M/UL (ref 4.7–6.1)
SODIUM SERPL-SCNC: 138 MMOL/L (ref 135–145)
WBC # BLD AUTO: 11.2 K/UL (ref 4.8–10.8)

## 2018-12-09 PROCEDURE — A9270 NON-COVERED ITEM OR SERVICE: HCPCS | Performed by: HOSPITALIST

## 2018-12-09 PROCEDURE — 99232 SBSQ HOSP IP/OBS MODERATE 35: CPT | Performed by: INTERNAL MEDICINE

## 2018-12-09 PROCEDURE — 80048 BASIC METABOLIC PNL TOTAL CA: CPT

## 2018-12-09 PROCEDURE — 700102 HCHG RX REV CODE 250 W/ 637 OVERRIDE(OP): Performed by: HOSPITALIST

## 2018-12-09 PROCEDURE — 36415 COLL VENOUS BLD VENIPUNCTURE: CPT

## 2018-12-09 PROCEDURE — A9270 NON-COVERED ITEM OR SERVICE: HCPCS | Performed by: INTERNAL MEDICINE

## 2018-12-09 PROCEDURE — 700102 HCHG RX REV CODE 250 W/ 637 OVERRIDE(OP): Performed by: INTERNAL MEDICINE

## 2018-12-09 PROCEDURE — 83735 ASSAY OF MAGNESIUM: CPT

## 2018-12-09 PROCEDURE — 770020 HCHG ROOM/CARE - TELE (206)

## 2018-12-09 PROCEDURE — 85027 COMPLETE CBC AUTOMATED: CPT

## 2018-12-09 PROCEDURE — 82962 GLUCOSE BLOOD TEST: CPT

## 2018-12-09 RX ADMIN — POTASSIUM CHLORIDE 40 MEQ: 1500 TABLET, EXTENDED RELEASE ORAL at 05:36

## 2018-12-09 RX ADMIN — LISINOPRIL 40 MG: 20 TABLET ORAL at 05:36

## 2018-12-09 RX ADMIN — INSULIN HUMAN 2 UNITS: 100 INJECTION, SOLUTION PARENTERAL at 11:35

## 2018-12-09 RX ADMIN — METOPROLOL TARTRATE 100 MG: 50 TABLET ORAL at 05:35

## 2018-12-09 RX ADMIN — ASPIRIN 81 MG: 81 TABLET, COATED ORAL at 05:36

## 2018-12-09 RX ADMIN — CLOPIDOGREL 75 MG: 75 TABLET, FILM COATED ORAL at 05:35

## 2018-12-09 RX ADMIN — AMLODIPINE BESYLATE 10 MG: 10 TABLET ORAL at 05:35

## 2018-12-09 RX ADMIN — ATORVASTATIN CALCIUM 40 MG: 40 TABLET, FILM COATED ORAL at 21:37

## 2018-12-09 RX ADMIN — INSULIN HUMAN 2 UNITS: 100 INJECTION, SOLUTION PARENTERAL at 17:06

## 2018-12-09 RX ADMIN — METOPROLOL TARTRATE 100 MG: 50 TABLET ORAL at 17:03

## 2018-12-09 ASSESSMENT — PAIN SCALES - GENERAL
PAINLEVEL_OUTOF10: 0

## 2018-12-09 ASSESSMENT — ENCOUNTER SYMPTOMS
NAUSEA: 0
FEVER: 0
CHILLS: 0
VOMITING: 0
DOUBLE VISION: 0
SPUTUM PRODUCTION: 0
ORTHOPNEA: 0
COUGH: 0
PALPITATIONS: 1
BLURRED VISION: 0

## 2018-12-09 ASSESSMENT — PATIENT HEALTH QUESTIONNAIRE - PHQ9
SUM OF ALL RESPONSES TO PHQ9 QUESTIONS 1 AND 2: 0
1. LITTLE INTEREST OR PLEASURE IN DOING THINGS: NOT AT ALL
2. FEELING DOWN, DEPRESSED, IRRITABLE, OR HOPELESS: NOT AT ALL

## 2018-12-09 NOTE — PROGRESS NOTES
Assumed pt care. Pt a/o denies sob/pain or needs at this time. Tele box on. White board updated. Bed locked, low position.

## 2018-12-09 NOTE — PROGRESS NOTES
Pt. Verbalizes desire to leave AMA. Education regarding risks of hypertension and complications from hypertension provided. Pt. Agreeable to 1 more nights stay after education. Bed alarm on. Pt. States he no longer wishes to leave AMA. Charge RN notified. Refuses bed alarm. Wanderguard unavailable. Frequent checks performed. Will continue to monitor.

## 2018-12-09 NOTE — ASSESSMENT & PLAN NOTE
Seen by cardiology not likely a NSTEMI, but related to his uncontrolled HTN  No need for further cardiac work up.

## 2018-12-09 NOTE — PROGRESS NOTES
Pt. chente @ [phone] from ANA Jacques. Denies pain/SOB. Bed in low/locked position, call bell within reach. No acute distress at this time, will continue to monitor.

## 2018-12-09 NOTE — PROGRESS NOTES
Pt. Tells CNA that he would like to go for a walk on the unit. Per CNA, pt. Walks off unit taking cell phone with him. Clothing and wallet remain in room. Code purple called, charge RN notified.    1310: Security returns with ptSea Mina states that he did not understand the instructions that he could not leave the unit without supervision. States that he does not wish to leave AMA. Returned to room.

## 2018-12-09 NOTE — CARE PLAN
Problem: Knowledge Deficit  Goal: Knowledge of disease process/condition, treatment plan, diagnostic tests, and medications will improve  Outcome: PROGRESSING SLOWER THAN EXPECTED  Pt. Asks this AM if he can leave AMA. Education regarding disease process and risks with noncompliance to medication regime provided. Pt. Agrees to stay 1 more night in hospital.    Problem: Mobility  Goal: Risk for activity intolerance will decrease  Outcome: PROGRESSING AS EXPECTED  Pt. Ambulates with standby assist.

## 2018-12-09 NOTE — PROGRESS NOTES
Hospital Medicine Daily Progress Note    Date of Service  12/9/2018    Chief Complaint  33 y.o. male admitted 12/7/2018 with palpitation and elevated trop     Hospital Course    This is a 33 y.o. male who presented on 12/7/2018 with palpitations. Patient is known to the hospitalist services and was admitted to our facility approximately 1 month ago for NSTEMI.  He has a known history of coronary artery disease and had previous cardiac stents placed at an outside facility.  During his last hospitalization, he underwent cardiac catheterization with 100% occlusion of the left circumflex noted and subsequently required PCI.  He had been medically optimized and discharged home with prescription for all of his medications but he states that when he went to pick them up, he could not afford many of them. Here he was found to have elevated trop. And also uncontrolled  BP. Cardiology has been consulted appreciate rec.        Interval Problem Update  No acute events overnight, denies chest pain seen by cardiology, not likely NSTEMI as per cards but trop elevation related to his not controlled BP.      Consultants/Specialty  Cardiology: Dr. Stallworth     Code Status  Full code     Disposition  Home when medically cleared     Review of Systems  Review of Systems   Constitutional: Negative for chills and fever.   HENT: Negative for congestion and ear discharge.    Eyes: Negative for blurred vision and double vision.   Respiratory: Negative for cough and sputum production.    Cardiovascular: Positive for palpitations. Negative for orthopnea.   Gastrointestinal: Negative for nausea and vomiting.   Genitourinary: Negative for dysuria and urgency.   Skin: Negative for rash.        Physical Exam  Temp:  [36.7 °C (98 °F)-37.1 °C (98.8 °F)] 36.7 °C (98 °F)  Pulse:  [73-85] 76  Resp:  [17-20] 17  BP: (128-175)/() 138/103    Physical Exam   Constitutional: He is oriented to person, place, and time.   HENT:   Head: Normocephalic and  atraumatic.   Eyes: Conjunctivae are normal. No scleral icterus.   Neck: Neck supple. No JVD present.   Cardiovascular: Normal rate.  Exam reveals no gallop.    Pulmonary/Chest: He has no wheezes. He has no rales.   Abdominal: Soft. Bowel sounds are normal. He exhibits no distension. There is no tenderness. There is no rebound.   Musculoskeletal: He exhibits no edema.   Neurological: He is alert and oriented to person, place, and time.   Skin: Skin is warm and dry.   Nursing note and vitals reviewed.      Fluids    Intake/Output Summary (Last 24 hours) at 12/09/18 1448  Last data filed at 12/09/18 1400   Gross per 24 hour   Intake              500 ml   Output                0 ml   Net              500 ml       Laboratory  Recent Labs      12/07/18   2211 12/08/18   0338  12/09/18   0330   WBC  11.8*  11.5*  11.2*   RBC  5.14  5.28  5.61   HEMOGLOBIN  14.6  14.9  15.7   HEMATOCRIT  42.9  44.5  47.8   MCV  83.5  84.3  85.2   MCH  28.4  28.2  28.0   MCHC  34.0  33.5*  32.8*   RDW  38.1  38.1  39.8   PLATELETCT  322  329  351   MPV  9.5  9.5  9.6     Recent Labs      12/07/18   2211  12/09/18   0330   SODIUM  139  138   POTASSIUM  3.4*  4.2   CHLORIDE  105  109   CO2  24  22   GLUCOSE  191*  142*   BUN  11  17   CREATININE  1.04  1.15   CALCIUM  8.7  9.7         Recent Labs      12/07/18 2211   BNPBTYPENAT  189*           Imaging  DX-CHEST-PORTABLE (1 VIEW)   Final Result      No acute cardiopulmonary disease evident.           Assessment/Plan  Coronary artery disease involving native coronary artery of native heart with angina pectoris (HCC)- (present on admission)   Assessment & Plan    Treatment as noted above.     Hypertensive emergency- (present on admission)   Assessment & Plan    Secondary to medical noncompliance,   resume home Norvasc, Prinivil, metoprolol, and add IV hydralazine for breakthrough.    Monitor      Elevated troponin   Assessment & Plan    Seen by cardiology not likely a NSTEMI, but related to  his uncontrolled HTN  No need for further cardiac work up.      Medical non-compliance   Assessment & Plan    Counseled at bedside, this has been an ongoing issue for this patient.   Sw worker also helping with his medication regarding financial issues      T2DM (type 2 diabetes mellitus) ([facility])   Assessment & Plan    Hold metformin   Cont on  Accu-Cheks and cover with insulin sliding scale.     Tobacco abuse   Assessment & Plan    Counseled on cessation           VTE prophylaxis: heparin

## 2018-12-09 NOTE — CONSULTS
Cardiology Consult Note:    Jose To  Date & Time note created:    12/8/2018   6:32 PM     Referring MD:  Dr. Gutierrez    Patient ID:   Name:             Mina Olmstead   YOB: 1985  Age:                 33 y.o.  male   MRN:               [id number]                                                             Chief Complaint / Reason for consult:  HTN emergency, CAD, prior coronary stent.    History of Present Illness:    This is a 33 years old man with prior history of uncontrolled diabetes, hypertension, medical therapy noncompliance, ongoing tobacco abuse, hypertension, hyperlipidemia, extensive coronary arterial disease with recent acute myocardial infarction with proximal LAD stent placement in 11/2018 along with prior stent in the mid and the distal LAD placed in 2017, presented to the hospital with chest pain.  Patient has not been taking his medications.  His systolic blood pressure was 214 in the ER.    I personally interpreted his EKG tracing which showed sinus rhythm without ACS. + T wave inversion (non-specific).    Review of Systems:      Constitutional: Denies fevers, Denies weight changes  Eyes: Denies changes in vision, no eye pain  Ears/Nose/Throat/Mouth: Denies nasal congestion or sore throat   Cardiovascular: yes chest pain, no palpitations   Respiratory: yes shortness of breath , Denies cough  Gastrointestinal/Hepatic: Denies abdominal pain, nausea, vomiting, diarrhea, constipation or GI bleeding   Genitourinary: Denies dysuria or frequency  Musculoskeletal/Rheum: Denies  joint pain and swelling   Skin: Denies rash  Neurological: Denies headache, confusion, memory loss or focal weakness/parasthesias  Psychiatric: denies mood disorder   Endocrine: Nneka thyroid problems  Heme/Oncology/Lymph Nodes: Denies enlarged lymph nodes, denies brusing or known bleeding disorder  All other systems were reviewed and are negative (AMA/CMS criteria)                Past Medical History:    Past Medical History:   Diagnosis Date   • Diabetes ([facility])    • Hypertension    • MI (myocardial infarction) ([facility]) 2017    x2 stints placed   • Myocardial infarct ([facility]) 11/2018    1 stint place     Active Hospital Problems    Diagnosis   • Hypertensive emergency [I16.1]     Priority: High   • Coronary artery disease involving native coronary artery of native heart with angina pectoris ([facility]) [I25.119]     Priority: High   • Tobacco abuse [Z72.0]     Priority: Low   • T2DM (type 2 diabetes mellitus) ([facility]) [E11.9]   • Medical non-compliance [Z91.19]       Past Surgical History:  Past Surgical History:   Procedure Laterality Date   • OTHER CARDIAC SURGERY  11/2018    1 stent placed   • OTHER CARDIAC SURGERY  09/18/2017    2 stents       Hospital Medications:    Current Facility-Administered Medications:   •  Influenza Vaccine Quad pf injection 0.5 mL, 0.5 mL, Intramuscular, Once, Mariia Gutierrez M.D.  •  pneumococcal vaccine (PNEUMOVAX-23) injection 25 mcg, 0.5 mL, Intramuscular, Once, Mariia Gutierrez M.D.  •  potassium chloride SA (Kdur) tablet 40 mEq, 40 mEq, Oral, DAILY, Mariia Gutierrez M.D., 40 mEq at 12/08/18 1651  •  [START ON 12/9/2018] lisinopril (PRINIVIL) tablet 40 mg, 40 mg, Oral, DAILY, Jose Stallworth M.D.  •  metoprolol (LOPRESSOR) tablet 100 mg, 100 mg, Oral, BID, Jose Stallworth M.D., 100 mg at 12/08/18 1651  •  nitroglycerin (NITRO-BID) 2 % ointment 1 Inch, 1 Inch, Topical, Once, Suresh Powers M.D., Stopped at 12/07/18 2330  •  clopidogrel (PLAVIX) tablet 75 mg, 75 mg, Oral, DAILY, Giovana Lees M.D., 75 mg at 12/08/18 0523  •  atorvastatin (LIPITOR) tablet 40 mg, 40 mg, Oral, QHS, Giovana Lees M.D., 40 mg at 12/08/18 0029  •  aspirin EC (ECOTRIN) tablet 81 mg, 81 mg, Oral, DAILY, Giovana Lees M.D., 81 mg at 12/08/18 0524  •  amLODIPine (NORVASC) tablet 5 mg, 5 mg, Oral, DAILY, Giovana Lees M.D., 5 mg at 12/08/18 0524  •  senna-docusate (PERICOLACE or SENOKOT S) 8.6-50 MG per tablet 2 Tab, 2 Tab, Oral,  BID **AND** polyethylene glycol/lytes (MIRALAX) PACKET 1 Packet, 1 Packet, Oral, QDAY PRN **AND** magnesium hydroxide (MILK OF MAGNESIA) suspension 30 mL, 30 mL, Oral, QDAY PRN **AND** bisacodyl (DULCOLAX) suppository 10 mg, 10 mg, Rectal, QDAY PRN, Giovana Lees M.D.  •  nitroglycerin (NITROSTAT) tablet 0.4 mg, 0.4 mg, Sublingual, Q5 MIN PRN, Giovana Lees M.D.  •  morphine (pf) 10 mg/mL injection 2-4 mg, 2-4 mg, Intravenous, Q5 MIN PRN, Giovana Lees M.D.  •  Metoprolol Tartrate (LOPRESSOR) injection 5 mg, 5 mg, Intravenous, Q5 MIN PRN, Giovana Lees M.D.  •  insulin regular (HUMULIN R) injection 2-9 Units, 2-9 Units, Subcutaneous, Q6HRS, Stopped at 12/08/18 1800 **AND** Accu-Chek Q6 if NPO, , , Q6H **AND** NOTIFY MD and PharmD, , , Once **AND** glucose 4 g chewable tablet 16 g, 16 g, Oral, Q15 MIN PRN **AND** dextrose 50% (D50W) injection 25 mL, 25 mL, Intravenous, Q15 MIN PRN, Giovana Lees M.D.  •  hydrALAZINE (APRESOLINE) injection 20 mg, 20 mg, Intravenous, Q6HRS PRN, Giovana Lees M.D., 20 mg at 12/08/18 0030  •  nicotine (NICODERM) 21 MG/24HR 21 mg, 21 mg, Transdermal, Daily-0600 **AND** Nicotine Replacement Patient Education Materials, , , Once **AND** nicotine polacrilex (NICORETTE) 2 MG piece 2 mg, 2 mg, Oral, Q HOUR PRN, Giovana Lees M.D.    Current Outpatient Medications:  Prescriptions Prior to Admission   Medication Sig Dispense Refill Last Dose   • atorvastatin (LIPITOR) 40 MG Tab Take 40 mg by mouth every morning.   12/6/2018 at AM   • metoprolol SR (TOPROL XL) 100 MG TABLET SR 24 HR Take 100 mg by mouth every day.   12/6/2018 at PM   • metFORMIN (GLUCOPHAGE) 500 MG Tab Take 500 mg by mouth 2 Times a Day.   12/6/2018 at PM   • lisinopril (PRINIVIL) 10 MG Tab Take 10 mg by mouth every day.   12/6/2018 at AM   • amLODIPine (NORVASC) 5 MG Tab Take 5 mg by mouth every day.   12/6/2018 at am   • clopidogrel (PLAVIX) 75 MG Tab Take 75 mg by mouth every morning.   12/6/2018 at am   •  "glyBURIDE (DIABETA) 5 MG Tab Take 5 mg by mouth every morning with breakfast.   12/6/2018 at am   • aspirin EC 81 MG EC tablet Take 1 Tab by mouth every day. 30 Tab  12/6/2018 at am       Medication Allergy:  No Known Allergies    Family History:  Family History   Problem Relation Age of Onset   • Heart Disease Father    • Hypertension Father    • Heart Attack Father 45   • Diabetes Mother    • Hypertension Mother        Social History:  Social History     Social History   • Marital status:      Spouse name: N/A   • Number of children: N/A   • Years of education: N/A     Occupational History   • Not on file.     Social History Main Topics   • Smoking status: Current Every Day Smoker     Packs/day: 0.50     Types: Cigarettes   • Smokeless tobacco: Current User     Types: Chew   • Alcohol use Yes      Comment: occas   • Drug use: No   • Sexual activity: Not on file     Other Topics Concern   • Not on file     Social History Narrative   • No narrative on file         Physical Exam:  Vitals/ General Appearance:   Weight/BMI: Body mass index is 36.37 kg/m².  Blood pressure (!) 175/112, pulse 73, temperature 36.7 °C (98.1 °F), temperature source Temporal, resp. rate 17, height 1.626 m (5' 4\"), weight 96.1 kg (211 lb 13.8 oz), SpO2 93 %.  Vitals:    12/08/18 0400 12/08/18 0800 12/08/18 1200 12/08/18 1600   BP: 152/94 152/109 (!) 163/114 (!) 175/112   Pulse: 85 71 76 73   Resp: 18 18 17 17   Temp: 36.2 °C (97.1 °F) 36.7 °C (98.1 °F) 36.7 °C (98 °F) 36.7 °C (98.1 °F)   TempSrc: Temporal Temporal Temporal Temporal   SpO2: 97% 97% 98% 93%   Weight:       Height:         Oxygen Therapy:  Pulse Oximetry: 93 %, O2 (LPM): 0, O2 Delivery: None (Room Air)    Constitutional:   Well developed, Well nourished, No acute distress  HENMT:  Normocephalic, Atraumatic, Oropharynx moist mucous membranes, No oral exudates, Nose normal.  No thyromegaly.  Eyes:  EOMI, Conjunctiva normal, No discharge.  Neck:  Normal range of motion, No " cervical tenderness,  no JVD.  Cardiovascular:  Normal heart rate, Normal rhythm, No murmurs, No rubs, No gallops.   Extremitites with intact distal pulses, no cyanosis, or edema.  Lungs:  Normal breath sounds, breath sounds clear to auscultation bilaterally,  no rales, no rhonchi, no wheezing.   Abdomen: Bowel sounds normal, Soft, No tenderness, No guarding, No rebound, No masses, No hepatosplenomegaly.  Skin: Warm, Dry, No erythema, No rash, no induration.  Neurologic: Alert & oriented x 3, No focal deficits noted, cranial nerves II through X are intact.  Psychiatric: Affect normal, Judgment normal, Mood normal.      MDM (Data Review):     Records reviewed and summarized in current documentation    Lab Data Review:  Recent Results (from the past 24 hour(s))   EKG    Collection Time: 18  9:20 PM   Result Value Ref Range    Report       [facility] Emergency Dept.    Test Date:  2018  Pt Name:    BRYCE CONNER         Department: ER  MRN:        [id number]                      Room:  Gender:     Male                         Technician: [id number]  :        1985                   Requested By:ER TRIAGE PROTOCOL  Order #:    [id number]                    Reading MD: ALEX GARCIA MD    Measurements  Intervals                                Axis  Rate:       108                          P:          62  NV:         148                          QRS:        34  QRSD:       98                           T:          151  QT:         364  QTc:        488    Interpretive Statements  SINUS TACHYCARDIA  RSR' IN V1 OR V2, RIGHT VCD OR RVH  NONSPECIFIC T ABNORMALITIES, LATERAL LEADS  BORDERLINE PROLONGED QT INTERVAL  Compared to ECG 2018 18:07:36  Right ventricular hypertrophy now present  T-wave abnormality now present  Sinus rhythm no longer present  Early repolarization no longer present  Possi ble ischemia no longer present  ST (T wave) deviation no longer present    Electronically  Signed On 12-7-2018 22:14:38 PST by ALEX GARCIA MD     CBC WITH DIFFERENTIAL    Collection Time: 12/07/18 10:11 PM   Result Value Ref Range    WBC 11.8 (H) 4.8 - 10.8 K/uL    RBC 5.14 4.70 - 6.10 M/uL    Hemoglobin 14.6 14.0 - 18.0 g/dL    Hematocrit 42.9 42.0 - 52.0 %    MCV 83.5 81.4 - 97.8 fL    MCH 28.4 27.0 - 33.0 pg    MCHC 34.0 33.7 - 35.3 g/dL    RDW 38.1 35.9 - 50.0 fL    Platelet Count 322 164 - 446 K/uL    MPV 9.5 9.0 - 12.9 fL    Neutrophils-Polys 60.60 44.00 - 72.00 %    Lymphocytes 31.30 22.00 - 41.00 %    Monocytes 5.60 0.00 - 13.40 %    Eosinophils 1.50 0.00 - 6.90 %    Basophils 0.70 0.00 - 1.80 %    Immature Granulocytes 0.30 0.00 - 0.90 %    Nucleated RBC 0.00 /100 WBC    Neutrophils (Absolute) 7.16 1.82 - 7.42 K/uL    Lymphs (Absolute) 3.70 1.00 - 4.80 K/uL    Monos (Absolute) 0.66 0.00 - 0.85 K/uL    Eos (Absolute) 0.18 0.00 - 0.51 K/uL    Baso (Absolute) 0.08 0.00 - 0.12 K/uL    Immature Granulocytes (abs) 0.03 0.00 - 0.11 K/uL    NRBC (Absolute) 0.00 K/uL   COMP METABOLIC PANEL    Collection Time: 12/07/18 10:11 PM   Result Value Ref Range    Sodium 139 135 - 145 mmol/L    Potassium 3.4 (L) 3.6 - 5.5 mmol/L    Chloride 105 96 - 112 mmol/L    Co2 24 20 - 33 mmol/L    Anion Gap 10.0 0.0 - 11.9    Glucose 191 (H) 65 - 99 mg/dL    Bun 11 8 - 22 mg/dL    Creatinine 1.04 0.50 - 1.40 mg/dL    Calcium 8.7 8.5 - 10.5 mg/dL    AST(SGOT) 18 12 - 45 U/L    ALT(SGPT) 15 2 - 50 U/L    Alkaline Phosphatase 59 30 - 99 U/L    Total Bilirubin 0.4 0.1 - 1.5 mg/dL    Albumin 4.1 3.2 - 4.9 g/dL    Total Protein 7.0 6.0 - 8.2 g/dL    Globulin 2.9 1.9 - 3.5 g/dL    A-G Ratio 1.4 g/dL   TROPONIN    Collection Time: 12/07/18 10:11 PM   Result Value Ref Range    Troponin I 0.13 (H) 0.00 - 0.04 ng/mL   BNP    Collection Time: 12/07/18 10:11 PM   Result Value Ref Range    B Natriuretic Peptide 189 (H) 0 - 100 pg/mL   TSH    Collection Time: 12/07/18 10:11 PM   Result Value Ref Range    TSH 1.130 0.380 - 5.330 uIU/mL    FREE THYROXINE    Collection Time: 18 10:11 PM   Result Value Ref Range    Free T-4 0.85 0.53 - 1.43 ng/dL   ESTIMATED GFR    Collection Time: 18 10:11 PM   Result Value Ref Range    GFR If African American >60 >60 mL/min/1.73 m 2    GFR If Non African American >60 >60 mL/min/1.73 m 2   ACCU-CHEK GLUCOSE    Collection Time: 18 12:55 AM   Result Value Ref Range    Glucose - Accu-Ck 141 (H) 65 - 99 mg/dL   Troponin in four (4) hours    Collection Time: 18  3:38 AM   Result Value Ref Range    Troponin I 0.16 (H) 0.00 - 0.04 ng/mL   CBC without Differential    Collection Time: 18  3:38 AM   Result Value Ref Range    WBC 11.5 (H) 4.8 - 10.8 K/uL    RBC 5.28 4.70 - 6.10 M/uL    Hemoglobin 14.9 14.0 - 18.0 g/dL    Hematocrit 44.5 42.0 - 52.0 %    MCV 84.3 81.4 - 97.8 fL    MCH 28.2 27.0 - 33.0 pg    MCHC 33.5 (L) 33.7 - 35.3 g/dL    RDW 38.1 35.9 - 50.0 fL    Platelet Count 329 164 - 446 K/uL    MPV 9.5 9.0 - 12.9 fL   ACCU-CHEK GLUCOSE    Collection Time: 18  5:27 AM   Result Value Ref Range    Glucose - Accu-Ck 147 (H) 65 - 99 mg/dL   EKG in four (4) hours    Collection Time: 18  6:25 AM   Result Value Ref Range    Report       [facility]    Test Date:  2018  Pt Name:    BRYCE CONNER         Department: ER  MRN:        [id number]                      Room:       T728  Gender:     Male                         Technician: [id number]  :        1985                   Requested By:JENN VILLAR  Order #:    [id number]                    Reading MD: Carlos Enrique Sherwood MD    Measurements  Intervals                                Axis  Rate:       83                           P:          31  NH:         144                          QRS:        -6  QRSD:       94                           T:          157  QT:         444  QTc:        522    Interpretive Statements  SINUS RHYTHM  ABNORMAL st/T, CONSIDER ISCHEMIA, LATERAL LEADS  PROLONGED QT INTERVAL  Compared to ECG  12/07/2018 21:20:28  Sinus tachycardia no longer present      Electronically Signed On 12-8-2018 7:53:41 PST by Carlos Enrique Sherwood MD     TROPONIN    Collection Time: 12/08/18  7:37 AM   Result Value Ref Range    Troponin I 0.14 (H) 0.00 - 0.04 ng/mL   ACCU-CHEK GLUCOSE    Collection Time: 12/08/18 12:06 PM   Result Value Ref Range    Glucose - Accu-Ck 205 (H) 65 - 99 mg/dL   ACCU-CHEK GLUCOSE    Collection Time: 12/08/18  5:04 PM   Result Value Ref Range    Glucose - Accu-Ck 108 (H) 65 - 99 mg/dL       Imaging/Procedures Review:    Chest Xray:  Reviewed    EKG:   As in HPI.     MDM (Assessment and Plan):     Active Hospital Problems    Diagnosis   • Hypertensive emergency [I16.1]     Priority: High   • Coronary artery disease involving native coronary artery of native heart with angina pectoris ([facility]) [I25.119]     Priority: High   • Tobacco abuse [Z72.0]     Priority: Low   • T2DM (type 2 diabetes mellitus) ([facility]) [E11.9]   • Medical non-compliance [Z91.19]         At this time, patient's presentation is related to hypertensive emergency secondary to noncompliance to medical therapy.  No evidence of acute coronary syndrome at this time.  I spent a significant amount of time talking to patient about the need to comply with his medical therapy.  This is very dangerous for him long-term.  The risks of future death in this patient is extremely high if he does not change his outlook and compliance to medications.  Restart medical therapy to optimize blood pressure control for now.  No indication for anticoagulation therapy.  No indication for further cardiac testing.    Will sign off at this time, please call us with further questions.  Patient will be followed in the outpatient cardiology clinic for further cardiac care.    I spent 5 minutes talking to patient about the danger of smoking. I advised patient and counseled patient on smoking cessation. Patient has promised to achieve goal of zero cigarettes per  day.      Thank you for referring this patient to our cardiology service.    Jose Stallworth MD.   Cardiology Inpatient Service.  [facility].  [phone].  Shannon Erwin.

## 2018-12-10 VITALS
WEIGHT: 196.21 LBS | BODY MASS INDEX: 33.5 KG/M2 | OXYGEN SATURATION: 98 % | RESPIRATION RATE: 17 BRPM | HEART RATE: 71 BPM | DIASTOLIC BLOOD PRESSURE: 105 MMHG | HEIGHT: 64 IN | SYSTOLIC BLOOD PRESSURE: 152 MMHG | TEMPERATURE: 97.7 F

## 2018-12-10 LAB — GLUCOSE BLD-MCNC: 142 MG/DL (ref 65–99)

## 2018-12-10 PROCEDURE — 99239 HOSP IP/OBS DSCHRG MGMT >30: CPT | Performed by: INTERNAL MEDICINE

## 2018-12-10 PROCEDURE — A9270 NON-COVERED ITEM OR SERVICE: HCPCS | Performed by: INTERNAL MEDICINE

## 2018-12-10 PROCEDURE — A9270 NON-COVERED ITEM OR SERVICE: HCPCS | Performed by: HOSPITALIST

## 2018-12-10 PROCEDURE — 700102 HCHG RX REV CODE 250 W/ 637 OVERRIDE(OP): Performed by: INTERNAL MEDICINE

## 2018-12-10 PROCEDURE — 700102 HCHG RX REV CODE 250 W/ 637 OVERRIDE(OP): Performed by: HOSPITALIST

## 2018-12-10 PROCEDURE — 82962 GLUCOSE BLOOD TEST: CPT

## 2018-12-10 RX ADMIN — CLOPIDOGREL 75 MG: 75 TABLET, FILM COATED ORAL at 05:44

## 2018-12-10 RX ADMIN — LISINOPRIL 40 MG: 20 TABLET ORAL at 05:44

## 2018-12-10 RX ADMIN — AMLODIPINE BESYLATE 10 MG: 10 TABLET ORAL at 05:45

## 2018-12-10 RX ADMIN — METOPROLOL TARTRATE 100 MG: 50 TABLET ORAL at 05:45

## 2018-12-10 RX ADMIN — ASPIRIN 81 MG: 81 TABLET, COATED ORAL at 05:44

## 2018-12-10 RX ADMIN — POTASSIUM CHLORIDE 40 MEQ: 1500 TABLET, EXTENDED RELEASE ORAL at 05:44

## 2018-12-10 ASSESSMENT — PAIN SCALES - GENERAL
PAINLEVEL_OUTOF10: 0
PAINLEVEL_OUTOF10: 0

## 2018-12-10 NOTE — PROGRESS NOTES
Patient awake, alert, and oriented.  Discharge instructions reviewed and copy given to patient.  Patient verbalized understanding.  Left FA 20g INT d/c'd without difficulty.  Site benign.  Home medications returned to patient.  No c/o voiced.  No acute distress noted.

## 2018-12-10 NOTE — CARE PLAN
Problem: Communication  Goal: The ability to communicate needs accurately and effectively will improve  Outcome: PROGRESSING AS EXPECTED      Problem: Safety  Goal: Will remain free from injury  Outcome: PROGRESSING AS EXPECTED      Problem: Venous Thromboembolism (VTW)/Deep Vein Thrombosis (DVT) Prevention:  Goal: Patient will participate in Venous Thrombosis (VTE)/Deep Vein Thrombosis (DVT)Prevention Measures  Outcome: PROGRESSING AS EXPECTED      Problem: Knowledge Deficit  Goal: Knowledge of disease process/condition, treatment plan, diagnostic tests, and medications will improve  Outcome: PROGRESSING AS EXPECTED

## 2018-12-10 NOTE — CARE PLAN
Problem: Communication  Goal: The ability to communicate needs accurately and effectively will improve    Intervention: Educate patient and significant other/support system about the plan of care, procedures, treatments, medications and allow for questions  Discussed with patient the plan of care for today and his inquire regarding possible discharge.  Verbalized understanding.

## 2018-12-10 NOTE — DISCHARGE INSTRUCTIONS
Discharge Instructions    Discharged to home by car with relative. Discharged via walking, hospital escort: Refused.  Special equipment needed: Not Applicable    Be sure to schedule a follow-up appointment with your primary care doctor or any specialists as instructed.     Discharge Plan:   Smoking Cessation Offered: Patient Refused  Influenza Vaccine Indication: Patient Refuses    I understand that a diet low in cholesterol, fat, and sodium is recommended for good health. Unless I have been given specific instructions below for another diet, I accept this instruction as my diet prescription.   Other diet: Cardiac    Special Instructions:     · Is patient discharged on Warfarin / Coumadin?   No     Depression / Suicide Risk    As you are discharged from this [facility] facility, it is important to learn how to keep safe from harming yourself.    Recognize the warning signs:  · Abrupt changes in personality, positive or negative- including increase in energy   · Giving away possessions  · Change in eating patterns- significant weight changes-  positive or negative  · Change in sleeping patterns- unable to sleep or sleeping all the time   · Unwillingness or inability to communicate  · Depression  · Unusual sadness, discouragement and loneliness  · Talk of wanting to die  · Neglect of personal appearance   · Rebelliousness- reckless behavior  · Withdrawal from people/activities they love  · Confusion- inability to concentrate     If you or a loved one observes any of these behaviors or has concerns about self-harm, here's what you can do:  · Talk about it- your feelings and reasons for harming yourself  · Remove any means that you might use to hurt yourself (examples: pills, rope, extension cords, firearm)  · Get professional help from the community (Mental Health, Substance Abuse, psychological counseling)  · Do not be alone:Call your Safe Contact- someone whom you trust who will be there for you.  · Call your  local CRISIS HOTLINE 785-9075 or 731-868-7982  · Call your local Children's Mobile Crisis Response Team [place] (685) 007-5397 or www.[employer] [facility]/[employer]ubator  · Call the toll free National Suicide Prevention Hotlines   · National Suicide Prevention Lifeline 057-177-TNYG (9812)  · National MedprivÃ[employer] Line Network 800-SUICIDE (154-6289)

## 2018-12-10 NOTE — DISCHARGE SUMMARY
Discharge Summary    CHIEF COMPLAINT ON ADMISSION  Chief Complaint   Patient presents with   • Palpitations       Reason for Admission  Chest Pain     Admission Date  12/7/2018    CODE STATUS  Prior    HPI & HOSPITAL COURSE  This is a 33 y.o. male who presented on 12/7/2018 with palpitations. Patient is known to the hospitalist services and was admitted to our facility approximately 1 month ago for NSTEMI.  He has a known history of coronary artery disease and had previous cardiac stents placed at an outside facility.  During his last hospitalization, he underwent cardiac catheterization with 100% occlusion of the left circumflex noted and subsequently required PCI.  He had been medically optimized and discharged home with prescription for all of his medications but he states that when he went to pick them up, he could not afford many of them. Here he was found to have elevated trop. And also uncontrolled  BP. Cardiology was consulted and evaluated pt and as per cardiology his elevated trop not likely a NSTEMI but related to his uncontrolled LEAH due to his noncompliance with medication. Pt was restarted on his BP med's, and his blood pressure is better controlled, troponin trended down.  Pt was counseled on compliance with medication.  Pt will be discharged home today     The patient met 2-midnight criteria for an inpatient stay at the time of discharge.    Discharge Date  12/10/2018    FOLLOW UP ITEMS POST DISCHARGE  Cardiology     DISCHARGE DIAGNOSES  Active Problems:    Hypertensive emergency POA: Yes    Coronary artery disease involving native coronary artery of native heart with angina pectoris (HCC) POA: Yes    Tobacco abuse POA: Unknown    T2DM (type 2 diabetes mellitus) (HCC) POA: Unknown    Medical non-compliance POA: Unknown    Elevated troponin POA: Unknown  Resolved Problems:    * No resolved hospital problems. *      FOLLOW UP  No future appointments.  No follow-up provider specified.    MEDICATIONS ON  DISCHARGE     Medication List      CONTINUE taking these medications      Instructions   amLODIPine 5 MG Tabs  Commonly known as:  NORVASC   Take 5 mg by mouth every day.  Dose:  5 mg     aspirin 81 MG EC tablet   Take 1 Tab by mouth every day.  Dose:  81 mg     atorvastatin 40 MG Tabs  Commonly known as:  LIPITOR   Take 40 mg by mouth every morning.  Dose:  40 mg     clopidogrel 75 MG Tabs  Commonly known as:  PLAVIX   Take 75 mg by mouth every morning.  Dose:  75 mg     glyBURIDE 5 MG Tabs  Commonly known as:  DIABETA   Take 5 mg by mouth every morning with breakfast.  Dose:  5 mg     lisinopril 10 MG Tabs  Commonly known as:  PRINIVIL   Take 10 mg by mouth every day.  Dose:  10 mg     metFORMIN 500 MG Tabs  Commonly known as:  GLUCOPHAGE   Take 500 mg by mouth 2 Times a Day.  Dose:  500 mg     metoprolol  MG Tb24  Commonly known as:  TOPROL XL   Take 100 mg by mouth every day.  Dose:  100 mg            Allergies  No Known Allergies    DIET  No orders of the defined types were placed in this encounter.      ACTIVITY  As tolerated.  Weight bearing as tolerated    CONSULTATIONS  Cardiology    PROCEDURES  None     LABORATORY  Lab Results   Component Value Date    SODIUM 138 12/09/2018    POTASSIUM 4.2 12/09/2018    CHLORIDE 109 12/09/2018    CO2 22 12/09/2018    GLUCOSE 142 (H) 12/09/2018    BUN 17 12/09/2018    CREATININE 1.15 12/09/2018        Lab Results   Component Value Date    WBC 11.2 (H) 12/09/2018    HEMOGLOBIN 15.7 12/09/2018    HEMATOCRIT 47.8 12/09/2018    PLATELETCT 351 12/09/2018        Total time of the discharge process exceeds 41 minutes.

## 2018-12-10 NOTE — PROGRESS NOTES
Bedside shift report received from ANA Lujan.  Patient awake, alert, and oriented.  No c/o voiced.  No acute distress noted.

## 2018-12-10 NOTE — PROGRESS NOTES
Bedside report received. Pt a/o sitting up in bed eating dinner. Denies pain/sob. Call light within reach. Bed locked, low position.

## 2018-12-10 NOTE — CARE PLAN
Problem: Safety  Goal: Will remain free from falls    Intervention: Assess risk factors for falls  Patient assessed for fall risk.  Discussed importance of calling for assistance if generalized weakness or dizziness occurs.  Verbalized understanding.

## 2019-01-22 ENCOUNTER — TELEPHONE (OUTPATIENT)
Dept: SCHEDULING | Facility: IMAGING CENTER | Age: 34
End: 2019-01-22

## 2019-02-04 ENCOUNTER — OFFICE VISIT (OUTPATIENT)
Dept: MEDICAL GROUP | Facility: PHYSICIAN GROUP | Age: 34
End: 2019-02-04

## 2019-02-04 VITALS
HEART RATE: 93 BPM | DIASTOLIC BLOOD PRESSURE: 118 MMHG | WEIGHT: 208 LBS | OXYGEN SATURATION: 97 % | SYSTOLIC BLOOD PRESSURE: 178 MMHG | RESPIRATION RATE: 14 BRPM | BODY MASS INDEX: 35.51 KG/M2 | TEMPERATURE: 97 F | HEIGHT: 64 IN

## 2019-02-04 DIAGNOSIS — E11.65 TYPE 2 DIABETES MELLITUS WITH HYPERGLYCEMIA, WITHOUT LONG-TERM CURRENT USE OF INSULIN (HCC): ICD-10-CM

## 2019-02-04 DIAGNOSIS — I10 ESSENTIAL HYPERTENSION: ICD-10-CM

## 2019-02-04 DIAGNOSIS — E66.9 OBESITY (BMI 35.0-39.9 WITHOUT COMORBIDITY): ICD-10-CM

## 2019-02-04 DIAGNOSIS — F17.210 CIGARETTE NICOTINE DEPENDENCE WITHOUT COMPLICATION: ICD-10-CM

## 2019-02-04 PROBLEM — E66.09 CLASS 1 OBESITY DUE TO EXCESS CALORIES WITHOUT SERIOUS COMORBIDITY WITH BODY MASS INDEX (BMI) OF 34.0 TO 34.9 IN ADULT: Status: RESOLVED | Noted: 2018-11-01 | Resolved: 2019-02-04

## 2019-02-04 PROBLEM — E66.811 CLASS 1 OBESITY DUE TO EXCESS CALORIES WITHOUT SERIOUS COMORBIDITY WITH BODY MASS INDEX (BMI) OF 34.0 TO 34.9 IN ADULT: Status: RESOLVED | Noted: 2018-11-01 | Resolved: 2019-02-04

## 2019-02-04 PROBLEM — I16.1 HYPERTENSIVE EMERGENCY: Status: RESOLVED | Noted: 2018-11-01 | Resolved: 2019-02-04

## 2019-02-04 PROBLEM — E11.9 T2DM (TYPE 2 DIABETES MELLITUS) (HCC): Status: RESOLVED | Noted: 2018-12-08 | Resolved: 2019-02-04

## 2019-02-04 PROBLEM — R79.89 ELEVATED TROPONIN: Status: RESOLVED | Noted: 2018-12-09 | Resolved: 2019-02-04

## 2019-02-04 PROBLEM — Z91.199 MEDICAL NON-COMPLIANCE: Status: RESOLVED | Noted: 2018-12-08 | Resolved: 2019-02-04

## 2019-02-04 LAB
HBA1C MFR BLD: 6.5 % (ref ?–5.8)
INT CON NEG: NORMAL
INT CON POS: NORMAL

## 2019-02-04 PROCEDURE — 83036 HEMOGLOBIN GLYCOSYLATED A1C: CPT | Performed by: NURSE PRACTITIONER

## 2019-02-04 PROCEDURE — 99204 OFFICE O/P NEW MOD 45 MIN: CPT | Mod: 25 | Performed by: NURSE PRACTITIONER

## 2019-02-04 PROCEDURE — 99406 BEHAV CHNG SMOKING 3-10 MIN: CPT | Performed by: NURSE PRACTITIONER

## 2019-02-04 RX ORDER — LISINOPRIL AND HYDROCHLOROTHIAZIDE 20; 12.5 MG/1; MG/1
1 TABLET ORAL DAILY
Qty: 30 TAB | Refills: 0 | Status: SHIPPED | OUTPATIENT
Start: 2019-02-04 | End: 2019-06-17 | Stop reason: SDUPTHER

## 2019-02-04 ASSESSMENT — ENCOUNTER SYMPTOMS
SHORTNESS OF BREATH: 0
CHILLS: 0
PALPITATIONS: 0
ABDOMINAL PAIN: 0
BLURRED VISION: 0
BLOOD IN STOOL: 0
FEVER: 0
COUGH: 0
HEADACHES: 0
DIZZINESS: 0

## 2019-02-04 ASSESSMENT — PATIENT HEALTH QUESTIONNAIRE - PHQ9: CLINICAL INTERPRETATION OF PHQ2 SCORE: 0

## 2019-02-04 NOTE — PROGRESS NOTES
New Patient appointment    Mina Burch is a 33 y.o. male here to establish care. His previous PCP was none. He presents with the following concerns:    HPI:      Essential hypertension  Chronic in nature. His BP is elevated at today's visit. He is currently taking lisinopril 20mg, amlodipine 5mg, and metoprolol 100mg. He denies CP or SOB. He is a current smoker of 14-year history. He does have history of MI x 2; 2017 with 2 stent placements and 2018 with 1 stent placement. He is requesting to have form completed to allow him to complete pre employment physical exam requiring evaluation of physical demands. He is taking Plavix and baby aspirin daily.      Type 2 diabetes mellitus with hyperglycemia, without long-term current use of insulin (HCC)  Chronic in nature. He is taking glyburide and metformin daily. He is not monitoring BS levels at home. He is noncompliant with diet and physical activity.       Current medicines (including changes today)  Current Outpatient Prescriptions   Medication Sig Dispense Refill   • lisinopril-hydrochlorothiazide (PRINZIDE, ZESTORETIC) 20-12.5 MG per tablet Take 1 Tab by mouth every day. 30 Tab 0   • atorvastatin (LIPITOR) 40 MG Tab Take 40 mg by mouth every morning.     • metoprolol SR (TOPROL XL) 100 MG TABLET SR 24 HR Take 100 mg by mouth every day.     • metFORMIN (GLUCOPHAGE) 500 MG Tab Take 500 mg by mouth 2 Times a Day.     • amLODIPine (NORVASC) 5 MG Tab Take 5 mg by mouth every day.     • clopidogrel (PLAVIX) 75 MG Tab Take 75 mg by mouth every morning.     • glyBURIDE (DIABETA) 5 MG Tab Take 5 mg by mouth every morning with breakfast.     • aspirin EC 81 MG EC tablet Take 1 Tab by mouth every day. 30 Tab      No current facility-administered medications for this visit.      He  has a past medical history of Diabetes ([facility]); Hyperlipidemia; Hypertension; MI (myocardial infarction) ([facility]) (2017); and Myocardial infarct ([facility]) (11/2018).  He  has a past surgical  history that includes other cardiac surgery (2018) and other cardiac surgery (2017).  Social History   Substance Use Topics   • Smoking status: Current Every Day Smoker     Packs/day: 0.50     Years: 14.00     Types: Cigarettes   • Smokeless tobacco: Current User     Types: Chew   • Alcohol use Yes      Comment: occasionally      Social History     Social History Narrative   • No narrative on file     Family History   Problem Relation Age of Onset   • Heart Disease Father    • Hypertension Father    • Heart Attack Father 45   • Diabetes Mother    • Hypertension Mother    • Cancer Maternal Grandfather         Brain    • No Known Problems Paternal Grandmother    • No Known Problems Paternal Grandfather    • No Known Problems Son    • No Known Problems Daughter      Family Status   Relation Status   • Fa    • Mo        There are no preventive care reminders to display for this patient.    Review of Systems   Constitutional: Negative for chills, fever and malaise/fatigue.   HENT: Negative for hearing loss.    Eyes: Negative for blurred vision.   Respiratory: Negative for cough and shortness of breath.    Cardiovascular: Negative for chest pain, palpitations and leg swelling.   Gastrointestinal: Negative for abdominal pain and blood in stool.   Neurological: Negative for dizziness and headaches.       All other systems reviewed and are negative    Depression Screening    Little interest or pleasure in doing things?  0 - not at all  Feeling down, depressed , or hopeless? 0 - not at all  Patient Health Questionnaire Score: 0    If depressive symptoms identified deferred to follow up visit unless specifically addressed in assesment and plan.      Interpretation of PHQ-9 Total Score   Score Severity   1-4 Minimal Depression   5-9 Mild Depression   10-14 Moderate Depression   15-19 Moderately Severe Depression   20-27 Severe Depression       Objective:     Blood pressure (!) 178/118, pulse 93,  "temperature 36.1 °C (97 °F), resp. rate 14, height 1.626 m (5' 4\"), weight 94.3 kg (208 lb), SpO2 97 %. Body mass index is 35.7 kg/m².    Physical Exam:  Constitutional: Oriented to person, place, and time and well-developed, well-nourished, and in no distress.   HENT:   Head: Normocephalic and atraumatic.   Right Ear: Tympanic membrane and external ear normal.   Left Ear: Tympanic membrane and external ear normal.   Mouth/Throat: Oropharynx is clear and moist and mucous membranes are normal. No oropharyngeal exudate or posterior oropharyngeal erythema.   Eyes: Conjunctivae and EOM are normal. Pupils are equal, round, and reactive to light.   Neck: Normal range of motion. Neck supple. No thyromegaly present.   Cardiovascular: Normal rate, regular rhythm, normal heart sounds. Radial pulses intact. Exam reveals no friction rub. No murmur heard.  Pulmonary/Chest: Effort normal and breath sounds normal. No respiratory distress or use of accessory muscles. No wheezes, rhonchi, or rales.   Abdominal: Soft. Bowel sounds are normal. Exhibits no distension and no mass. There is no tenderness. No hepatosplenomegaly.    Musculoskeletal: Full range of motion. No deformity or swelling of joints. DTRs intact.   Neurological: Alert and oriented to person, place, and time. Gait normal.   Skin: Skin is warm and dry. No cyanosis. No edema.  Psychiatric: Mood, memory, affect and judgment normal.     Assessment and Plan:   The following treatment plan was discussed    1. Essential hypertension  Uncontrolled. D/C lisinopril and initiate treatment with lisinopril-hctz daily. Strongly encouraged lifestyle modifications with smoking cessation, adequate daily water intake of 2 L, cardiovascular activity, and limit daily salt intake to less than 1 tsp. Advised that I would be unable to complete form until BP is better controlled.  - lisinopril-hydrochlorothiazide (PRINZIDE, ZESTORETIC) 20-12.5 MG per tablet; Take 1 Tab by mouth every day.  " Dispense: 30 Tab; Refill: 0    2. Type 2 diabetes mellitus with hyperglycemia, without long-term current use of insulin (HCC)  His HA1C was 6.5 today. Continue medications as prescribed. Continue working on lifestyle modifications with healthy, balanced diet low in simple carbohydrates.   - POCT  A1C    3. Obesity (BMI 35.0-39.9 without comorbidity)  - Patient identified as having weight management issue.  Appropriate orders and counseling given.    4. Cigarette nicotine dependence without complication  >3 minutes spent providing smoking cessation. Strongly encouraged to quit to prevent further exacerbation of BP, as well as decreased risk for cardiovascular events. Patient is not interested in quitting at this time.    Records requested.    Followup: Return in about 1 week (around 2/11/2019) for For follow-up on, HTN.

## 2019-02-04 NOTE — ASSESSMENT & PLAN NOTE
Chronic in nature. He is taking glyburide and metformin daily. He is not monitoring BS levels at home. He is noncompliant with diet and physical activity.

## 2019-02-04 NOTE — ASSESSMENT & PLAN NOTE
Chronic in nature. His BP is elevated at today's visit. He is currently taking lisinopril 20mg, amlodipine 5mg, and metoprolol 100mg. He denies CP or SOB. He is a current smoker of 14-year history. He does have history of MI x 2; 2017 with 2 stent placements and 2018 with 1 stent placement. He is requesting to have form completed to allow him to complete pre employment physical exam requiring evaluation of physical demands. He is taking Plavix and baby aspirin daily.

## 2019-02-11 ENCOUNTER — NON-PROVIDER VISIT (OUTPATIENT)
Dept: MEDICAL GROUP | Facility: PHYSICIAN GROUP | Age: 34
End: 2019-02-11

## 2019-02-11 VITALS — DIASTOLIC BLOOD PRESSURE: 96 MMHG | SYSTOLIC BLOOD PRESSURE: 124 MMHG

## 2019-06-13 ENCOUNTER — HOSPITAL ENCOUNTER (EMERGENCY)
Facility: MEDICAL CENTER | Age: 34
End: 2019-06-13
Attending: EMERGENCY MEDICINE
Payer: COMMERCIAL

## 2019-06-13 VITALS
RESPIRATION RATE: 17 BRPM | TEMPERATURE: 98 F | WEIGHT: 205 LBS | DIASTOLIC BLOOD PRESSURE: 127 MMHG | SYSTOLIC BLOOD PRESSURE: 192 MMHG | OXYGEN SATURATION: 100 % | HEIGHT: 64 IN | BODY MASS INDEX: 35 KG/M2 | HEART RATE: 96 BPM

## 2019-06-13 DIAGNOSIS — I10 HYPERTENSION, UNSPECIFIED TYPE: ICD-10-CM

## 2019-06-13 DIAGNOSIS — M25.572 ACUTE BILATERAL ANKLE PAIN: ICD-10-CM

## 2019-06-13 DIAGNOSIS — M25.571 ACUTE BILATERAL ANKLE PAIN: ICD-10-CM

## 2019-06-13 PROCEDURE — 700102 HCHG RX REV CODE 250 W/ 637 OVERRIDE(OP): Performed by: EMERGENCY MEDICINE

## 2019-06-13 PROCEDURE — 99284 EMERGENCY DEPT VISIT MOD MDM: CPT

## 2019-06-13 PROCEDURE — A9270 NON-COVERED ITEM OR SERVICE: HCPCS | Performed by: EMERGENCY MEDICINE

## 2019-06-13 RX ORDER — AMLODIPINE BESYLATE 5 MG/1
5 TABLET ORAL DAILY
Qty: 30 TAB | Refills: 0 | Status: SHIPPED | OUTPATIENT
Start: 2019-06-13 | End: 2019-06-17 | Stop reason: SDUPTHER

## 2019-06-13 RX ORDER — PREDNISONE 20 MG/1
40 TABLET ORAL DAILY
Qty: 10 TAB | Refills: 0 | Status: SHIPPED | OUTPATIENT
Start: 2019-06-13 | End: 2019-06-17

## 2019-06-13 RX ORDER — COLCHICINE 0.6 MG/1
1.2 TABLET ORAL ONCE
Status: COMPLETED | OUTPATIENT
Start: 2019-06-13 | End: 2019-06-13

## 2019-06-13 RX ORDER — METOPROLOL SUCCINATE 100 MG/1
100 TABLET, EXTENDED RELEASE ORAL DAILY
Qty: 30 TAB | Refills: 0 | Status: SHIPPED | OUTPATIENT
Start: 2019-06-13 | End: 2019-06-17 | Stop reason: SDUPTHER

## 2019-06-13 RX ORDER — LISINOPRIL AND HYDROCHLOROTHIAZIDE 20; 12.5 MG/1; MG/1
1 TABLET ORAL DAILY
Qty: 30 TAB | Refills: 0 | Status: SHIPPED | OUTPATIENT
Start: 2019-06-13 | End: 2019-06-17

## 2019-06-13 RX ORDER — METOPROLOL SUCCINATE 50 MG/1
100 TABLET, EXTENDED RELEASE ORAL ONCE
Status: COMPLETED | OUTPATIENT
Start: 2019-06-13 | End: 2019-06-13

## 2019-06-13 RX ORDER — OXYCODONE HYDROCHLORIDE 5 MG/1
5 TABLET ORAL ONCE
Status: COMPLETED | OUTPATIENT
Start: 2019-06-13 | End: 2019-06-13

## 2019-06-13 RX ORDER — INDOMETHACIN 50 MG/1
50 CAPSULE ORAL 3 TIMES DAILY
Qty: 15 CAP | Refills: 0 | Status: SHIPPED | OUTPATIENT
Start: 2019-06-13 | End: 2019-10-28

## 2019-06-13 RX ORDER — LISINOPRIL 20 MG/1
20 TABLET ORAL ONCE
Status: COMPLETED | OUTPATIENT
Start: 2019-06-13 | End: 2019-06-13

## 2019-06-13 RX ORDER — AMLODIPINE BESYLATE 5 MG/1
5 TABLET ORAL ONCE
Status: COMPLETED | OUTPATIENT
Start: 2019-06-13 | End: 2019-06-13

## 2019-06-13 RX ADMIN — OXYCODONE HYDROCHLORIDE 5 MG: 5 TABLET ORAL at 05:45

## 2019-06-13 RX ADMIN — COLCHICINE 1.2 MG: 0.6 TABLET, FILM COATED ORAL at 06:05

## 2019-06-13 RX ADMIN — METOPROLOL SUCCINATE 100 MG: 50 TABLET, EXTENDED RELEASE ORAL at 05:44

## 2019-06-13 RX ADMIN — AMLODIPINE BESYLATE 5 MG: 5 TABLET ORAL at 05:45

## 2019-06-13 RX ADMIN — LISINOPRIL 20 MG: 20 TABLET ORAL at 05:45

## 2019-06-13 ASSESSMENT — LIFESTYLE VARIABLES: DO YOU DRINK ALCOHOL: NO

## 2019-06-13 NOTE — ED NOTES
Pt discharged home via ambulatory to lobby with steady gait, AOx4. Pt educated not to drive under the influence of narcotics, pt verbalized understanding. Pt in possession of belongings. Pt provided discharge education and information pertaining to medications and follow up appointments. Pt received copy of discharge instructions and verbalized understanding.

## 2019-06-13 NOTE — ED PROVIDER NOTES
ED Provider Note    Scribed for Indra Forbes M.D. by Ayse Hernandez. 6/13/2019, 5:26 AM.    Primary care provider: HARIKA Tejeda  Means of arrival: Wheel Chair  History obtained from: Patient  History limited by: None    CHIEF COMPLAINT  Chief Complaint   Patient presents with   • Ankle Pain     bilat       [facility]  Mina Burch is a 34 y.o. male with a history of gout and hypertension who presents to the Emergency Department for evaluation of left ankle pain which began one week ago and has been worsening. He reports that the pain radiates to the top of his left foot, but he has also begun to experience the same pain in his right ankle. The pain is exacerbated with weight bearing activities like walking. The patient has attempted to take Aleve and wear braces around his ankles with no alleviation. He has a history of gout and states his symptoms tonight feel similar to prior flare ups. His most recent gout flare up was 5 months ago, but he states his gout flare ups are typically localized to his toes. He denies associated swelling to his ankles or fevers. The patient has a history of hypertension, but has not taken his medications for a few months as he ran out of his prescription. He has an appointment on Monday with his PCP for refill of his meds. He denies any chest pain, shortness of breath, headache, confusion. No IV drug abuse.    REVIEW OF SYSTEMS  Pertinent positives include bilateral ankle pain. Pertinent negatives include no swelling to ankles or fevers.   See HPI for further details.     PAST MEDICAL HISTORY   has a past medical history of Diabetes ([facility]); Hyperlipidemia; Hypertension; MI (myocardial infarction) ([facility]) (2017); and Myocardial infarct ([facility]) (11/2018).    SURGICAL HISTORY   has a past surgical history that includes other cardiac surgery (11/2018) and other cardiac surgery (09/18/2017).    SOCIAL HISTORY  Social History   Substance Use Topics   • Smoking status: Current  "Every Day Smoker     Packs/day: 0.50     Years: 14.00     Types: Cigarettes   • Smokeless tobacco: Current User     Types: Chew   • Alcohol use Yes      Comment: occasionally       History   Drug Use   • Types: Marijuana       FAMILY HISTORY  Family History   Problem Relation Age of Onset   • Heart Disease Father    • Hypertension Father    • Heart Attack Father 45   • Diabetes Mother    • Hypertension Mother    • Cancer Maternal Grandfather         Brain    • No Known Problems Paternal Grandmother    • No Known Problems Paternal Grandfather    • No Known Problems Son    • No Known Problems Daughter        CURRENT MEDICATIONS  Home Medications     Reviewed by Himanshu Pink R.N. (Registered Nurse) on 06/13/19 at 0444  Med List Status: Partial   Medication Last Dose Status   amLODIPine (NORVASC) 5 MG Tab  Active   aspirin EC 81 MG EC tablet  Active   atorvastatin (LIPITOR) 40 MG Tab  Active   clopidogrel (PLAVIX) 75 MG Tab  Active   glyBURIDE (DIABETA) 5 MG Tab  Active   lisinopril-hydrochlorothiazide (PRINZIDE, ZESTORETIC) 20-12.5 MG per tablet  Active   metFORMIN (GLUCOPHAGE) 500 MG Tab  Active   metoprolol SR (TOPROL XL) 100 MG TABLET SR 24 HR  Active                ALLERGIES  No Known Allergies    PHYSICAL EXAM  VITAL SIGNS: BP (!) 192/127 Comment: Pt with hx of HTN, hasn't been taking medications  Pulse (!) 104   Temp 36.7 °C (98 °F) (Temporal)   Resp 18   Ht 1.626 m (5' 4\")   Wt 93 kg (205 lb)   SpO2 98%   BMI 35.19 kg/m²   Vitals reviewed.  Constitutional:  Alert, able to answer questions  HENT: Moist mucous membranes, Nose is normal in appearance without rhinorrhea, external ears are normal  Eyes: Anicteric,  pupils are equal round and reactive, there is no conjunctival drainage or pallor   Neck: The trachea is midline, there is no obvious mass or meningeal signs  Cardiovascular: Tachycardic rate and regular rhythm, no murmurs.  Thorax & Lungs: Respiratory rate and effort are normal. There is " normal chest excursion with respiration.  Clear to auscultation bilaterally.  Abdomen: Abdomen is normal in appearance, no gross peritoneal signs  Musculoskeletal: Good range of motion in all major joints. No major deformities noted. Tenderness to palpation over dorsum of left foot, decreased range of motion in left ankle secondary to pain, no erythema/edema/crepitus. Tenderness over anterior aspect of the left ankle. Full ROM in right ankle with no erythema, edema, or tenderness.   Skin: Visualized skin is warm, no erythema, no rash.  Neurologic:  Cranial nerves II through XII are intact there is no focal abnormality noted.  Psychiatric: Normal mood and mentation    COURSE & MEDICAL DECISION MAKING  Nursing notes, VS, PMSFHx reviewed in chart.  Differential diagnoses include but not limited to: gout, septic arthitis, muscle strain, fracture, cellulitis, arthritis      5:26 AM Patient seen and examined at bedside. Patient arrives tachycardic but afebrile with otherwise normal vital signs. Patient appears well hydrated and non-toxic. Patient's left ankle is tender over the anterior aspect and over dorsum of foot and he has decreased ROM due to his pain. No erythema, warmth, crepitus. Right ankle at this time appears normal without any tenderness.  He says the symptoms are just beginning in that ankle. I suspect that this is a gout flare. He is not an IV drug abuser so septic arthritis is less likely. No trauma; low suspicion for fracture. Patient will be treated with Oxycodone 5 mg, Colcrys 1.2 mg. We discussed that he cannot drive after taking the oxycodone.    Patient has a history of HTN and has reportedly been out of his meds for months. He has not followed up with his PCP yet but has an appointment on Monday. He denies any chest pain, shortness of breath, jaw pain, nausea. I will treat him with his home meds and refill his prescriptions. He needs to keep his Monday appointment.    Upon recheck patient's symptoms  are significantly improved. His blood pressure has improved and I suspect it will continue to do so now that he has his prescriptions. He is able to ambulate. He has requested a prescription for indomethacin which I will provide. He will follow up with his PCP on Monday.    The patient will return for new or worsening symptoms and is stable at the time of discharge.    The patient is referred to a primary physician for blood pressure management, diabetic screening, and for all other preventative health concerns.    DISPOSITION:  Patient discharged home in good and stable condition.    FOLLOW UP:  No follow-up provider specified.    OUTPATIENT MEDICATIONS:  Discharge Medication List as of 6/13/2019  7:07 AM      START taking these medications    Details   !! amLODIPine (NORVASC) 5 MG Tab Take 1 Tab by mouth every day., Disp-30 Tab, R-0, Print Rx Paper      !! metoprolol SR (TOPROL XL) 100 MG TABLET SR 24 HR Take 1 Tab by mouth every day., Disp-30 Tab, R-0, Print Rx Paper      !! lisinopril-hydrochlorothiazide (PRINZIDE, ZESTORETIC) 20-12.5 MG per tablet Take 1 Tab by mouth every day., Disp-30 Tab, R-0, Print Rx Paper       !! - Potential duplicate medications found. Please discuss with provider.          FINAL IMPRESSION  1. Acute bilateral ankle pain    2. Hypertension, unspecified type          I, Ayse Katz), am scribing for, and in the presence of, Indra Forbes M.D..    Electronically signed by: Ayse Katz), 6/13/2019    IIndra M.D. personally performed the services described in this documentation, as scribed by Ayse Hernandez in my presence, and it is both accurate and complete.    E.    The note accurately reflects work and decisions made by me.  Indra Forbes  6/13/2019  7:44 PM

## 2019-06-13 NOTE — DISCHARGE INSTRUCTIONS
You were seen in the ER for ankle pain which is likely due to gout. I have given you some pain medication. Your blood pressure has been high today and I have refilled your blood pressure medications. Please take them as directed. Follow up with your PCP on Monday as scheduled. Ibuprofen for pain control. Return to the ER with new or worsening symptoms.

## 2019-06-17 ENCOUNTER — OFFICE VISIT (OUTPATIENT)
Dept: MEDICAL GROUP | Facility: PHYSICIAN GROUP | Age: 34
End: 2019-06-17
Payer: COMMERCIAL

## 2019-06-17 ENCOUNTER — HOSPITAL ENCOUNTER (OUTPATIENT)
Dept: LAB | Facility: MEDICAL CENTER | Age: 34
End: 2019-06-17
Attending: NURSE PRACTITIONER
Payer: COMMERCIAL

## 2019-06-17 VITALS
SYSTOLIC BLOOD PRESSURE: 162 MMHG | HEIGHT: 64 IN | BODY MASS INDEX: 36.66 KG/M2 | WEIGHT: 214.73 LBS | RESPIRATION RATE: 18 BRPM | DIASTOLIC BLOOD PRESSURE: 120 MMHG | OXYGEN SATURATION: 96 % | TEMPERATURE: 97.4 F | HEART RATE: 79 BPM

## 2019-06-17 DIAGNOSIS — I10 ESSENTIAL HYPERTENSION: ICD-10-CM

## 2019-06-17 DIAGNOSIS — E11.9 TYPE 2 DIABETES MELLITUS WITHOUT COMPLICATION, WITHOUT LONG-TERM CURRENT USE OF INSULIN (HCC): ICD-10-CM

## 2019-06-17 DIAGNOSIS — E11.65 TYPE 2 DIABETES MELLITUS WITH HYPERGLYCEMIA, WITHOUT LONG-TERM CURRENT USE OF INSULIN (HCC): ICD-10-CM

## 2019-06-17 DIAGNOSIS — I25.10 CORONARY ARTERY DISEASE WITHOUT ANGINA PECTORIS, UNSPECIFIED VESSEL OR LESION TYPE, UNSPECIFIED WHETHER NATIVE OR TRANSPLANTED HEART: ICD-10-CM

## 2019-06-17 LAB
ALBUMIN SERPL BCP-MCNC: 3.9 G/DL (ref 3.2–4.9)
ALBUMIN/GLOB SERPL: 1.4 G/DL
ALP SERPL-CCNC: 54 U/L (ref 30–99)
ALT SERPL-CCNC: 22 U/L (ref 2–50)
ANION GAP SERPL CALC-SCNC: 9 MMOL/L (ref 0–11.9)
AST SERPL-CCNC: 23 U/L (ref 12–45)
BILIRUB SERPL-MCNC: 0.7 MG/DL (ref 0.1–1.5)
BUN SERPL-MCNC: 16 MG/DL (ref 8–22)
CALCIUM SERPL-MCNC: 9.1 MG/DL (ref 8.5–10.5)
CHLORIDE SERPL-SCNC: 106 MMOL/L (ref 96–112)
CHOLEST SERPL-MCNC: 162 MG/DL (ref 100–199)
CO2 SERPL-SCNC: 23 MMOL/L (ref 20–33)
CREAT SERPL-MCNC: 1.13 MG/DL (ref 0.5–1.4)
CREAT UR-MCNC: 126.9 MG/DL
FASTING STATUS PATIENT QL REPORTED: NORMAL
GLOBULIN SER CALC-MCNC: 2.8 G/DL (ref 1.9–3.5)
GLUCOSE SERPL-MCNC: 129 MG/DL (ref 65–99)
HDLC SERPL-MCNC: 25 MG/DL
LDLC SERPL CALC-MCNC: 119 MG/DL
MICROALBUMIN UR-MCNC: 4.6 MG/DL
MICROALBUMIN/CREAT UR: 36 MG/G (ref 0–30)
POTASSIUM SERPL-SCNC: 4.4 MMOL/L (ref 3.6–5.5)
PROT SERPL-MCNC: 6.7 G/DL (ref 6–8.2)
SODIUM SERPL-SCNC: 138 MMOL/L (ref 135–145)
TRIGL SERPL-MCNC: 90 MG/DL (ref 0–149)

## 2019-06-17 PROCEDURE — 82043 UR ALBUMIN QUANTITATIVE: CPT

## 2019-06-17 PROCEDURE — 82570 ASSAY OF URINE CREATININE: CPT

## 2019-06-17 PROCEDURE — 80061 LIPID PANEL: CPT

## 2019-06-17 PROCEDURE — 80053 COMPREHEN METABOLIC PANEL: CPT

## 2019-06-17 PROCEDURE — 36415 COLL VENOUS BLD VENIPUNCTURE: CPT

## 2019-06-17 PROCEDURE — 99214 OFFICE O/P EST MOD 30 MIN: CPT | Performed by: NURSE PRACTITIONER

## 2019-06-17 PROCEDURE — 83036 HEMOGLOBIN GLYCOSYLATED A1C: CPT

## 2019-06-17 RX ORDER — LISINOPRIL AND HYDROCHLOROTHIAZIDE 25; 20 MG/1; MG/1
1 TABLET ORAL DAILY
Qty: 30 TAB | Refills: 0 | Status: CANCELLED | OUTPATIENT
Start: 2019-06-17

## 2019-06-17 RX ORDER — ATORVASTATIN CALCIUM 40 MG/1
40 TABLET, FILM COATED ORAL EVERY MORNING
Qty: 90 TAB | Refills: 0 | Status: SHIPPED | OUTPATIENT
Start: 2019-06-17 | End: 2019-08-30 | Stop reason: SDUPTHER

## 2019-06-17 RX ORDER — LISINOPRIL AND HYDROCHLOROTHIAZIDE 20; 12.5 MG/1; MG/1
1 TABLET ORAL DAILY
Qty: 30 TAB | Refills: 0 | Status: SHIPPED | OUTPATIENT
Start: 2019-06-17 | End: 2019-08-30 | Stop reason: SDUPTHER

## 2019-06-17 RX ORDER — METOPROLOL SUCCINATE 100 MG/1
100 TABLET, EXTENDED RELEASE ORAL DAILY
Qty: 90 TAB | Refills: 1 | Status: SHIPPED | OUTPATIENT
Start: 2019-06-17 | End: 2019-08-30 | Stop reason: SDUPTHER

## 2019-06-17 RX ORDER — CLOPIDOGREL BISULFATE 75 MG/1
75 TABLET ORAL EVERY MORNING
Qty: 90 TAB | Refills: 0 | Status: SHIPPED | OUTPATIENT
Start: 2019-06-17 | End: 2019-08-30 | Stop reason: SDUPTHER

## 2019-06-17 RX ORDER — AMLODIPINE BESYLATE 5 MG/1
5 TABLET ORAL DAILY
Qty: 30 TAB | Refills: 0 | Status: SHIPPED | OUTPATIENT
Start: 2019-06-17 | End: 2019-08-30 | Stop reason: SDUPTHER

## 2019-06-17 ASSESSMENT — ENCOUNTER SYMPTOMS
CHILLS: 0
DIZZINESS: 0
FEVER: 0
HEADACHES: 0
BLURRED VISION: 0
PALPITATIONS: 0
SHORTNESS OF BREATH: 1

## 2019-06-17 NOTE — ASSESSMENT & PLAN NOTE
Chronic in nature. Last HA1C was 6.5 in Feb 2019. He was previously treated with glyburide 5mg and Metformin 500mg BID, however he has been out of medications since March. He is not monitoring BS levels at home. He is noncompliant with diet and physical activity.

## 2019-06-17 NOTE — ASSESSMENT & PLAN NOTE
Chronic in nature.  Onset began 2017.  He was previously treated with lisinopril hydrochlorothiazide 2012.5 mg and Norvasc 5 mg daily.  He does admit to being out of medication since March 2019 due lack of insurance.  He is experiencing intermittent shortness of breath.  He denies chest pain, headaches, cough, and/or dizziness.  He is a current smoker.  History of MI x 2 with 3 stents.

## 2019-06-17 NOTE — ASSESSMENT & PLAN NOTE
Chronic in nature.  History of MI x 2 with 3 stent placements between 2017 and 2018. He has been managed with metoprolol 100 mg, Plavix 75 mg, and atorvastatin 40 mg daily.  He has been out of this medication since March 2019 due to lack of insurance. He is a current smoker 1/2 ppd for 14-year history.

## 2019-06-17 NOTE — PROGRESS NOTES
Subjective:   Mina Burch is a 34 y.o. male here today for follow up on HTN.    Coronary artery disease involving native coronary artery of native heart with angina pectoris (HCC)  Chronic in nature.  History of MI x 2 with 3 stent placements between 2017 and 2018. He has been managed with metoprolol 100 mg, Plavix 75 mg, and atorvastatin 40 mg daily.  He has been out of this medication since March 2019 due to lack of insurance. He is a current smoker 1/2 ppd for 14-year history.    Essential hypertension  Chronic in nature.  Onset began 2017.  He was previously treated with lisinopril hydrochlorothiazide 2012.5 mg and Norvasc 5 mg daily.  He does admit to being out of medication since March 2019 due lack of insurance.  He is experiencing intermittent shortness of breath.  He denies chest pain, headaches, cough, and/or dizziness.  He is a current smoker.  History of MI x 2 with 3 stents.    Type 2 diabetes mellitus with hyperglycemia, without long-term current use of insulin (HCC)  Chronic in nature. Last HA1C was 6.5 in Feb 2019. He was previously treated with glyburide 5mg and Metformin 500mg BID, however he has been out of medications since March. He is not monitoring BS levels at home. He is noncompliant with diet and physical activity.    Current medicines (including changes today)  Current Outpatient Prescriptions   Medication Sig Dispense Refill   • lisinopril-hydrochlorothiazide (PRINZIDE, ZESTORETIC) 20-12.5 MG per tablet Take 1 Tab by mouth every day. 30 Tab 0   • metoprolol SR (TOPROL XL) 100 MG TABLET SR 24 HR Take 1 Tab by mouth every day. 90 Tab 1   • amLODIPine (NORVASC) 5 MG Tab Take 1 Tab by mouth every day. 30 Tab 0   • atorvastatin (LIPITOR) 40 MG Tab Take 1 Tab by mouth every morning. 90 Tab 0   • clopidogrel (PLAVIX) 75 MG Tab Take 1 Tab by mouth every morning. 90 Tab 0   • indomethacin (INDOCIN) 50 MG Cap Take 1 Cap by mouth 3 times a day. 15 Cap 0   • metFORMIN (GLUCOPHAGE) 500 MG  "Tab Take 500 mg by mouth 2 Times a Day.     • glyBURIDE (DIABETA) 5 MG Tab Take 5 mg by mouth every morning with breakfast.       No current facility-administered medications for this visit.      He  has a past medical history of Diabetes ([facility]); Hyperlipidemia; Hypertension; MI (myocardial infarction) ([facility]) (2017); and Myocardial infarct ([facility]) (11/2018).    Social History     Social History   • Marital status:      Spouse name: N/A   • Number of children: N/A   • Years of education: N/A     Occupational History   •       [employer] productive associate     Social History Main Topics   • Smoking status: Current Every Day Smoker     Packs/day: 0.50     Years: 14.00     Types: Cigarettes   • Smokeless tobacco: Current User     Types: Chew   • Alcohol use Yes      Comment: occasionally    • Drug use: Yes     Types: Marijuana   • Sexual activity: No      Comment:       Other Topics Concern   • Not on file     Social History Narrative   • No narrative on file       Review of Systems   Constitutional: Negative for chills, fever and malaise/fatigue.   Eyes: Negative for blurred vision.   Respiratory: Positive for shortness of breath.    Cardiovascular: Negative for chest pain and palpitations.   Neurological: Negative for dizziness and headaches.        Objective:     BP (!) 162/120   Pulse 79   Temp 36.3 °C (97.4 °F) (Temporal)   Resp 18   Ht 1.626 m (5' 4\")   Wt 97.4 kg (214 lb 11.7 oz)   SpO2 96%  Body mass index is 36.86 kg/m².     Physical Exam:  Constitutional: Oriented to person, place, and time and well-developed, well-nourished, and in no distress.   HENT:   Head: Normocephalic and atraumatic.   Eyes: Conjunctivae and EOM are normal. Pupils are equal, round, and reactive to light.   Neck: Normal range of motion. Neck supple..   Cardiovascular: Normal rate, regular rhythm, normal heart sounds. Radial pulses intact. Exam reveals no friction rub. No murmur heard.  Pulmonary/Chest: Effort normal and " breath sounds normal. No respiratory distress or use of accessory muscles. No wheezes, rhonchi, or rales.   Neurological: Alert and oriented to person, place, and time. Gait normal.   Skin: Skin is warm and dry. No cyanosis. No edema.  Psychiatric: Mood, memory, affect and judgment normal.       Assessment and Plan:   The following treatment plan was discussed    1. Essential hypertension  Uncontrolled.  Initiate treatment with previous medications.  We will plan to recheck blood pressure in 1 month.  Encouraged to monitor blood pressure at home and keep log.  Encourage lifestyle modifications with smoking cessation, adequate daily water intake, regular physical activity, and low-salt intake.  - lisinopril-hydrochlorothiazide (PRINZIDE, ZESTORETIC) 20-12.5 MG per tablet; Take 1 Tab by mouth every day.  Dispense: 30 Tab; Refill: 0  - amLODIPine (NORVASC) 5 MG Tab; Take 1 Tab by mouth every day.  Dispense: 30 Tab; Refill: 0  - Comp Metabolic Panel; Future    2. Coronary artery disease without angina pectoris, unspecified vessel or lesion type, unspecified whether native or transplanted heart  Stable.  Refill medications.  - lisinopril-hydrochlorothiazide (PRINZIDE, ZESTORETIC) 20-12.5 MG per tablet; Take 1 Tab by mouth every day.  Dispense: 30 Tab; Refill: 0  - metoprolol SR (TOPROL XL) 100 MG TABLET SR 24 HR; Take 1 Tab by mouth every day.  Dispense: 90 Tab; Refill: 1  - amLODIPine (NORVASC) 5 MG Tab; Take 1 Tab by mouth every day.  Dispense: 30 Tab; Refill: 0  - atorvastatin (LIPITOR) 40 MG Tab; Take 1 Tab by mouth every morning.  Dispense: 90 Tab; Refill: 0  - clopidogrel (PLAVIX) 75 MG Tab; Take 1 Tab by mouth every morning.  Dispense: 90 Tab; Refill: 0    3. Type 2 diabetes mellitus without complication, without long-term current use of insulin ([facility])  Labs ordered to reevaluate blood sugar levels.  Will determine treatment plan based on lab results.  - HEMOGLOBIN A1C; Future  - Lipid Profile; Future  -  MICROALBUMIN CREAT RATIO URINE (LAB COLLECT); Future      Followup: Return in about 1 month (around 7/17/2019) for For follow-up on, HTN.

## 2019-06-18 ENCOUNTER — TELEPHONE (OUTPATIENT)
Dept: MEDICAL GROUP | Facility: PHYSICIAN GROUP | Age: 34
End: 2019-06-18

## 2019-06-18 DIAGNOSIS — Z79.4 TYPE 2 DIABETES MELLITUS WITHOUT COMPLICATION, WITH LONG-TERM CURRENT USE OF INSULIN (HCC): ICD-10-CM

## 2019-06-18 DIAGNOSIS — E11.9 TYPE 2 DIABETES MELLITUS WITHOUT COMPLICATION, WITH LONG-TERM CURRENT USE OF INSULIN (HCC): ICD-10-CM

## 2019-06-18 LAB
EST. AVERAGE GLUCOSE BLD GHB EST-MCNC: 146 MG/DL
HBA1C MFR BLD: 6.7 % (ref 0–5.6)

## 2019-06-18 NOTE — TELEPHONE ENCOUNTER
"----- Message from HARIKA Tejeda sent at 6/18/2019 10:35 AM PDT -----  Lab results show elevated A1c of 6.7, low HDL \" good cholesterol\" of 25, and elevated LDL of 119.  All remaining labs were within normal limits.    Based on these lab findings, I do recommend that patient continue taking metformin 500 mg twice daily.  A prescription has been sent to pharmacy on file.    Please let me know if you have any further questions or concerns.    HARIKA Tejeda,FNP-C    "

## 2019-08-30 ENCOUNTER — OFFICE VISIT (OUTPATIENT)
Dept: URGENT CARE | Facility: CLINIC | Age: 34
End: 2019-08-30
Payer: COMMERCIAL

## 2019-08-30 ENCOUNTER — APPOINTMENT (OUTPATIENT)
Dept: RADIOLOGY | Facility: IMAGING CENTER | Age: 34
End: 2019-08-30
Attending: EMERGENCY MEDICINE
Payer: COMMERCIAL

## 2019-08-30 VITALS
SYSTOLIC BLOOD PRESSURE: 208 MMHG | WEIGHT: 195 LBS | BODY MASS INDEX: 33.29 KG/M2 | RESPIRATION RATE: 22 BRPM | HEART RATE: 108 BPM | OXYGEN SATURATION: 99 % | TEMPERATURE: 98.6 F | HEIGHT: 64 IN | DIASTOLIC BLOOD PRESSURE: 134 MMHG

## 2019-08-30 DIAGNOSIS — M79.672 FOOT PAIN, LEFT: ICD-10-CM

## 2019-08-30 DIAGNOSIS — E11.9 TYPE 2 DIABETES MELLITUS WITHOUT COMPLICATION, WITH LONG-TERM CURRENT USE OF INSULIN (HCC): ICD-10-CM

## 2019-08-30 DIAGNOSIS — M10.9 GOUTY ARTHROPATHY: ICD-10-CM

## 2019-08-30 DIAGNOSIS — I25.10 CORONARY ARTERY DISEASE WITHOUT ANGINA PECTORIS, UNSPECIFIED VESSEL OR LESION TYPE, UNSPECIFIED WHETHER NATIVE OR TRANSPLANTED HEART: ICD-10-CM

## 2019-08-30 DIAGNOSIS — I10 ESSENTIAL HYPERTENSION: ICD-10-CM

## 2019-08-30 DIAGNOSIS — Z79.4 TYPE 2 DIABETES MELLITUS WITHOUT COMPLICATION, WITH LONG-TERM CURRENT USE OF INSULIN (HCC): ICD-10-CM

## 2019-08-30 PROCEDURE — 99214 OFFICE O/P EST MOD 30 MIN: CPT | Performed by: EMERGENCY MEDICINE

## 2019-08-30 PROCEDURE — 73630 X-RAY EXAM OF FOOT: CPT | Mod: TC,LT | Performed by: EMERGENCY MEDICINE

## 2019-08-30 RX ORDER — ATORVASTATIN CALCIUM 40 MG/1
40 TABLET, FILM COATED ORAL EVERY MORNING
Qty: 30 TAB | Refills: 0 | Status: SHIPPED | OUTPATIENT
Start: 2019-08-30 | End: 2019-09-29

## 2019-08-30 RX ORDER — COLCHICINE 0.6 MG/1
TABLET ORAL
Qty: 3 TAB | Refills: 0 | Status: SHIPPED | OUTPATIENT
Start: 2019-08-30 | End: 2019-10-28

## 2019-08-30 RX ORDER — GLYBURIDE 5 MG/1
5 TABLET ORAL
Qty: 30 TAB | Refills: 0 | Status: SHIPPED | OUTPATIENT
Start: 2019-08-30 | End: 2019-09-29

## 2019-08-30 RX ORDER — CLOPIDOGREL BISULFATE 75 MG/1
75 TABLET ORAL EVERY MORNING
Qty: 30 TAB | Refills: 0 | Status: SHIPPED | OUTPATIENT
Start: 2019-08-30 | End: 2019-09-29

## 2019-08-30 RX ORDER — LISINOPRIL AND HYDROCHLOROTHIAZIDE 20; 12.5 MG/1; MG/1
1 TABLET ORAL DAILY
Qty: 30 TAB | Refills: 0 | Status: SHIPPED | OUTPATIENT
Start: 2019-08-30 | End: 2019-10-28 | Stop reason: SDUPTHER

## 2019-08-30 RX ORDER — INDOMETHACIN 75 MG/1
75 CAPSULE, EXTENDED RELEASE ORAL
Qty: 12 CAP | Refills: 0 | Status: SHIPPED | OUTPATIENT
Start: 2019-08-30 | End: 2019-10-28

## 2019-08-30 RX ORDER — METOPROLOL SUCCINATE 100 MG/1
100 TABLET, EXTENDED RELEASE ORAL DAILY
Qty: 30 TAB | Refills: 0 | Status: SHIPPED | OUTPATIENT
Start: 2019-08-30 | End: 2019-10-28 | Stop reason: SDUPTHER

## 2019-08-30 RX ORDER — AMLODIPINE BESYLATE 5 MG/1
5 TABLET ORAL DAILY
Qty: 30 TAB | Refills: 0 | Status: SHIPPED | OUTPATIENT
Start: 2019-08-30 | End: 2019-09-29

## 2019-08-30 ASSESSMENT — ENCOUNTER SYMPTOMS
SHORTNESS OF BREATH: 0
HEADACHES: 0
SENSORY CHANGE: 0
FEVER: 0
DIZZINESS: 0
JOINT SWELLING: 1
FOCAL WEAKNESS: 0
NUMBNESS: 0
PALPITATIONS: 0

## 2019-08-30 NOTE — PROGRESS NOTES
Subjective:      Mina Burch is a 34 y.o. male who presents with Ankle Pain (LT ankle pain cannot recall an injury. )            Foot Problem   This is a recurrent problem. Episode onset: 3 months. The problem has been rapidly worsening. Associated symptoms include joint swelling. Pertinent negatives include no chest pain, fever, headaches, numbness or rash. The symptoms are aggravated by bending and walking. He has tried NSAIDs for the symptoms. The treatment provided mild relief.   No trauma.  Notes history of gouty arthropathy of feet; prior treatment with colchicine/indomethacin.  Also notes not taking any prescribed medications per PCP order; unable to fill prescription, has not contacted PCP.    Review of Systems   Constitutional: Negative for fever.   Respiratory: Negative for shortness of breath.    Cardiovascular: Negative for chest pain and palpitations.   Musculoskeletal: Positive for joint swelling.        No pain proximal or distal to the sites.   Skin: Negative for rash.   Neurological: Negative for dizziness, sensory change, focal weakness, numbness and headaches.     PMH:  has a past medical history of Diabetes ([facility]), Hyperlipidemia, Hypertension, MI (myocardial infarction) ([facility]) (2017), and Myocardial infarct ([facility]) (11/2018).  MEDS:   Current Outpatient Medications:   •  amLODIPine (NORVASC) 5 MG Tab, Take 1 Tab by mouth every day for 30 days., Disp: 30 Tab, Rfl: 0  •  atorvastatin (LIPITOR) 40 MG Tab, Take 1 Tab by mouth every morning for 30 days., Disp: 30 Tab, Rfl: 0  •  clopidogrel (PLAVIX) 75 MG Tab, Take 1 Tab by mouth every morning for 30 days., Disp: 30 Tab, Rfl: 0  •  lisinopril-hydrochlorothiazide (PRINZIDE, ZESTORETIC) 20-12.5 MG per tablet, Take 1 Tab by mouth every day., Disp: 30 Tab, Rfl: 0  •  metFORMIN (GLUCOPHAGE) 500 MG Tab, Take 1 Tab by mouth 2 Times a Day for 90 days., Disp: 60 Tab, Rfl: 2  •  metoprolol SR (TOPROL XL) 100 MG TABLET SR 24 HR, Take 1 Tab by mouth  "every day., Disp: 30 Tab, Rfl: 0  •  indomethacin (INDOCIN) 50 MG Cap, Take 1 Cap by mouth 3 times a day. (Patient not taking: Reported on 8/30/2019), Disp: 15 Cap, Rfl: 0  •  glyBURIDE (DIABETA) 5 MG Tab, Take 5 mg by mouth every morning with breakfast., Disp: , Rfl:   ALLERGIES: No Known Allergies  SURGHX:   Past Surgical History:   Procedure Laterality Date   • OTHER CARDIAC SURGERY  11/2018    1 stent placed   • OTHER CARDIAC SURGERY  09/18/2017    2 stents     SOCHX:  reports that he has been smoking cigarettes. He has a 7.00 pack-year smoking history. He quit smokeless tobacco use about 8 months ago.  His smokeless tobacco use included chew. He reports that he drinks alcohol. He reports that he has current or past drug history. Drug: Marijuana.  FH: family history includes Cancer in his maternal grandfather; Diabetes in his mother; Heart Attack (age of onset: 45) in his father; Heart Disease in his father; Hypertension in his father and mother; No Known Problems in his daughter, paternal grandfather, paternal grandmother, and son.     Objective:     BP (!) 208/134 (BP Location: Right arm, Patient Position: Sitting, BP Cuff Size: Adult)   Pulse (!) 108   Temp 37 °C (98.6 °F) (Temporal)   Resp (!) 22   Ht 1.626 m (5' 4\")   Wt 88.5 kg (195 lb)   SpO2 99%   BMI 33.47 kg/m²      Physical Exam   Constitutional: He appears well-developed and well-nourished. He is cooperative. He does not have a sickly appearance. He does not appear ill. No distress.   Eyes: Conjunctivae are normal.   Neck: Phonation normal. No JVD present.   Cardiovascular: Regular rhythm and normal heart sounds.  No extrasystoles are present. Tachycardia present. Exam reveals no gallop.   No murmur heard.  No calf tenderness, Homans sign negative.   Pulmonary/Chest: Effort normal and breath sounds normal.   Abdominal: He exhibits no distension.   Musculoskeletal:        Left ankle: He exhibits decreased range of motion and swelling. " Tenderness. No head of 5th metatarsal and no proximal fibula tenderness found. Achilles tendon normal.        Left foot: There is decreased range of motion, tenderness and swelling. There is no crepitus.   Neurological: He is alert.   Skin: Skin is warm, dry and intact. No rash noted.   Psychiatric: He has a normal mood and affect.          Advised need for follow-up with PCP as soon as available     Assessment/Plan:     1. Gouty arthropathy  - colchicine (COLCRYS) 0.6 MG Tab; Take 2 tablets initially, repeat 1 tablet 1 hour later.  Dispense: 3 Tab; Refill: 0  - indomethacin SR (INDOCIN SR) 75 MG Cap CR; Take 1 Cap by mouth 2 times daily with meals as needed.  Dispense: 12 Cap; Refill: 0  Rest, crutches prn    2. Essential hypertension  - amLODIPine (NORVASC) 5 MG Tab; Take 1 Tab by mouth every day for 30 days.  Dispense: 30 Tab; Refill: 0  - lisinopril-hydrochlorothiazide (PRINZIDE, ZESTORETIC) 20-12.5 MG per tablet; Take 1 Tab by mouth every day.  Dispense: 30 Tab; Refill: 0    3. Coronary artery disease without angina pectoris, unspecified vessel or lesion type, unspecified whether native or transplanted heart  - atorvastatin (LIPITOR) 40 MG Tab; Take 1 Tab by mouth every morning for 30 days.  Dispense: 30 Tab; Refill: 0  - clopidogrel (PLAVIX) 75 MG Tab; Take 1 Tab by mouth every morning for 30 days.  Dispense: 30 Tab; Refill: 0  - metoprolol SR (TOPROL XL) 100 MG TABLET SR 24 HR; Take 1 Tab by mouth every day.  Dispense: 30 Tab; Refill: 0    4. Type 2 diabetes mellitus without complication, with long-term current use of insulin (HCC)  - metFORMIN (GLUCOPHAGE) 500 MG Tab; Take 1 Tab by mouth 2 Times a Day for 90 days.  Dispense: 60 Tab; Refill: 2    5. Foot pain, left  - DX-FOOT-COMPLETE 3+ LEFT; per radiologist:  No acute fracture or dislocation identified. Soft tissue swelling.

## 2019-08-30 NOTE — LETTER
August 30, 2019       Patient: Mina Burch   YOB: 1985   Date of Visit: 8/30/2019         To Whom It May Concern:    It is my medical opinion that Mina Burch is unable to attend work August 29-31, 2019      Sincerely,          Stanislaw Cordova M.D.  Electronically Signed

## 2019-10-28 ENCOUNTER — OFFICE VISIT (OUTPATIENT)
Dept: MEDICAL GROUP | Facility: PHYSICIAN GROUP | Age: 34
End: 2019-10-28
Payer: COMMERCIAL

## 2019-10-28 VITALS
HEART RATE: 110 BPM | TEMPERATURE: 97.8 F | DIASTOLIC BLOOD PRESSURE: 118 MMHG | SYSTOLIC BLOOD PRESSURE: 158 MMHG | OXYGEN SATURATION: 98 % | RESPIRATION RATE: 18 BRPM | HEIGHT: 64 IN | WEIGHT: 200 LBS | BODY MASS INDEX: 34.15 KG/M2

## 2019-10-28 DIAGNOSIS — E11.40 TYPE 2 DIABETES MELLITUS WITH DIABETIC NEUROPATHY, WITHOUT LONG-TERM CURRENT USE OF INSULIN (HCC): ICD-10-CM

## 2019-10-28 DIAGNOSIS — I10 ESSENTIAL HYPERTENSION: ICD-10-CM

## 2019-10-28 DIAGNOSIS — I25.10 CORONARY ARTERY DISEASE WITHOUT ANGINA PECTORIS, UNSPECIFIED VESSEL OR LESION TYPE, UNSPECIFIED WHETHER NATIVE OR TRANSPLANTED HEART: ICD-10-CM

## 2019-10-28 PROBLEM — M10.9 GOUT OF MULTIPLE SITES: Status: ACTIVE | Noted: 2019-10-28

## 2019-10-28 PROBLEM — E11.9 T2DM (TYPE 2 DIABETES MELLITUS) (HCC): Status: RESOLVED | Noted: 2018-12-08 | Resolved: 2019-10-28

## 2019-10-28 PROCEDURE — 99214 OFFICE O/P EST MOD 30 MIN: CPT | Performed by: NURSE PRACTITIONER

## 2019-10-28 RX ORDER — METOPROLOL SUCCINATE 100 MG/1
100 TABLET, EXTENDED RELEASE ORAL DAILY
Qty: 90 TAB | Refills: 0 | Status: SHIPPED | OUTPATIENT
Start: 2019-10-28 | End: 2020-04-29 | Stop reason: SDUPTHER

## 2019-10-28 RX ORDER — ATORVASTATIN CALCIUM 20 MG/1
20 TABLET, FILM COATED ORAL EVERY EVENING
Qty: 90 TAB | Refills: 0 | Status: SHIPPED | OUTPATIENT
Start: 2019-10-28 | End: 2020-04-29 | Stop reason: SDUPTHER

## 2019-10-28 RX ORDER — LISINOPRIL AND HYDROCHLOROTHIAZIDE 20; 12.5 MG/1; MG/1
1 TABLET ORAL DAILY
Qty: 90 TAB | Refills: 0 | Status: SHIPPED | OUTPATIENT
Start: 2019-10-28 | End: 2020-04-29 | Stop reason: SDUPTHER

## 2019-10-28 ASSESSMENT — ENCOUNTER SYMPTOMS
PALPITATIONS: 0
SHORTNESS OF BREATH: 0
BLURRED VISION: 0
CHILLS: 0
HEADACHES: 0
DIZZINESS: 0
FEVER: 0

## 2019-10-28 NOTE — ASSESSMENT & PLAN NOTE
Chronic in nature.  Blood pressure is managed with lisinopril- hydrochlorothiazide 20-12.5mg .  Patient's blood pressure today was 158/118.  He does admit that he has been out of his blood pressure medication for 2 weeks.  He denies chest pain, shortness of breath, headaches, or dizziness.

## 2019-10-28 NOTE — PROGRESS NOTES
Subjective:   Mina Burch is a 34 y.o. male here today for follow up on:    Essential hypertension  Chronic in nature.  Blood pressure is managed with lisinopril- hydrochlorothiazide 20-12.5mg .  Patient's blood pressure today was 158/118.  He does admit that he has been out of his blood pressure medication for 2 weeks.  He denies chest pain, shortness of breath, headaches, or dizziness.      Coronary artery disease involving native coronary artery of native heart with angina pectoris ([facility])  Chronic in nature. He has had MI x 2 with 3 stents.  He is not currently taking metoprolol or statin therapy.  He is a current smoker half a pack a day for 14-year history.    Type 2 diabetes mellitus with diabetic neuropathy, without long-term current use of insulin ([facility])  Chronic in nature. His last A1c drawn 6/17/2019 was 6.7.  He is not taking metformin 500 mg twice daily as prescribed.  He does not monitor blood sugar levels at home.  He is noncompliant with diet and physical activity.     Current medicines (including changes today)  Current Outpatient Medications   Medication Sig Dispense Refill   • metFORMIN (GLUCOPHAGE) 500 MG Tab Take 1 Tab by mouth 2 Times a Day for 90 days. 180 Tab 0   • metoprolol SR (TOPROL XL) 100 MG TABLET SR 24 HR Take 1 Tab by mouth every day. 90 Tab 0   • atorvastatin (LIPITOR) 20 MG Tab Take 1 Tab by mouth every evening. 90 Tab 0   • lisinopril-hydrochlorothiazide (PRINZIDE, ZESTORETIC) 20-12.5 MG per tablet Take 1 Tab by mouth every day. 90 Tab 0     No current facility-administered medications for this visit.      He  has a past medical history of Diabetes ([facility]), Hyperlipidemia, Hypertension, MI (myocardial infarction) ([facility]) (2017), and Myocardial infarct ([facility]) (11/2018).    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   Social Needs   • Financial resource strain: Not on file   • Food insecurity:     Worry: Not on file     Inability: Not on file   • Transportation needs:     Medical: Not on file     Non-medical: Not on file   Tobacco Use   • Smoking status: Current Every Day Smoker     Packs/day: 0.50     Years: 14.00     Pack years: 7.00     Types: Cigarettes   • Smokeless tobacco: Former User     Types: Chew     Quit date: 12/2018   Substance and Sexual Activity   • Alcohol use: Yes     Comment: occasionally    • Drug use: Yes     Types: Marijuana   • Sexual activity: Never     Partners: Female     Comment:     Lifestyle   • Physical activity:     Days per week: Not on file     Minutes per session: Not on file   • Stress: Not on file   Relationships   • Social connections:     Talks on phone: Not on file     Gets together: Not on file     Attends [employer] service: Not on file     Active member of club or organization: Not on file     Attends meetings of clubs or organizations: Not on file     Relationship status: Not on file   • Intimate partner violence:     Fear of current or ex partner: Not on file     Emotionally abused: Not on file     Physically abused: Not on file     Forced sexual activity: Not on file   Other Topics Concern   • Not on file   Social History Narrative   • Not on file       Review of Systems   Constitutional: Negative for chills, fever and malaise/fatigue.   Eyes: Negative for blurred vision.   Respiratory: Negative for shortness of breath.    Cardiovascular: Negative for chest pain and palpitations.   Neurological: Negative for dizziness and headaches.        Objective:     /118 (BP Location: Left arm, Patient Position: Sitting)   Pulse (!) 110   Temp 36.6 °C (97.8 °F)   Resp 18   Ht 1.626 m (5' 4\")   Wt 90.7 kg (200 lb)   SpO2 98%  Body mass index is 34.33 kg/m².     Physical Exam:  Constitutional: Oriented to " person, place, and time and well-developed, well-nourished, and in no distress.   HENT:   Head: Normocephalic and atraumatic.   Eyes: Conjunctivae and EOM are normal. Pupils are equal, round, and reactive to light.   Neck: Normal range of motion. Neck supple.   Cardiovascular: Normal rate, regular rhythm, normal heart sounds. Radial pulses intact. Exam reveals no friction rub. No murmur heard.  Pulmonary/Chest: Effort normal and breath sounds normal. No respiratory distress or use of accessory muscles. No wheezes, rhonchi, or rales.   Neurological: Alert and oriented to person, place, and time. Gait normal.   Skin: Skin is warm and dry. No cyanosis. No edema.  Psychiatric: Mood, memory, affect and judgment normal.       Assessment and Plan:   The following treatment plan was discussed    1. Essential hypertension  Uncontrolled. Restart lisinopril-hctz. Continue to work on lifestyle modifications including diet, regular physical activity, and smoking cessation.  - lisinopril-hydrochlorothiazide (PRINZIDE, ZESTORETIC) 20-12.5 MG per tablet; Take 1 Tab by mouth every day.  Dispense: 90 Tab; Refill: 0    2. Type 2 diabetes mellitus with diabetic neuropathy, without long-term current use of insulin (HCC)  Patient is noncompliant.  Advised to continue taking metformin 500 mg twice daily.  I also encouraged to monitor blood sugar levels at home and keep a log.  He was referred to podiatrist and ophthalmologist for further evaluation.  Routine screening labs ordered.  - REFERRAL TO OPHTHALMOLOGY  - Comp Metabolic Panel; Future  - HEMOGLOBIN A1C; Future  - Lipid Profile; Future  - MICROALBUMIN CREAT RATIO URINE (LAB COLLECT); Future  - REFERRAL TO PODIATRY  - CBC WITH DIFFERENTIAL; Future  - metFORMIN (GLUCOPHAGE) 500 MG Tab; Take 1 Tab by mouth 2 Times a Day for 90 days.  Dispense: 180 Tab; Refill: 0    3. Coronary artery disease without angina pectoris, unspecified vessel or lesion type, unspecified whether native or  transplanted heart  Patient is noncompliant with medication.  I counseled patient on importance of taking beta-blocker and statin therapy id[place] to prevent further cardiovascular events.  Patient verbalized understanding.  Patient was started on atorvastatin.  Refill metoprolol.  I did recommend that patient take over-the-counter co-Q10 supplement 100 to 200 mg daily.  - metoprolol SR (TOPROL XL) 100 MG TABLET SR 24 HR; Take 1 Tab by mouth every day.  Dispense: 90 Tab; Refill: 0  - lisinopril-hydrochlorothiazide (PRINZIDE, ZESTORETIC) 20-12.5 MG per tablet; Take 1 Tab by mouth every day.  Dispense: 90 Tab; Refill: 0  - atorvastatin (LIPITOR) 20 MG Tab; Take 1 Tab by mouth every evening.  Dispense: 90 Tab; Refill: 0    Followup: Return in about 3 months (around 1/28/2020), or if symptoms worsen or fail to improve, for For follow-up on, HTN, DM.

## 2019-10-28 NOTE — ASSESSMENT & PLAN NOTE
Chronic in nature. His last A1c drawn 6/17/2019 was 6.7.  He is not taking metformin 500 mg twice daily as prescribed.  He does not monitor blood sugar levels at home.  He is noncompliant with diet and physical activity.

## 2019-10-28 NOTE — ASSESSMENT & PLAN NOTE
Chronic in nature. He has had MI x 2 with 3 stents.  He is not currently taking metoprolol or statin therapy.  He is a current smoker half a pack a day for 14-year history.

## 2019-10-30 LAB — HBA1C MFR BLD: 6.6 % (ref 0–5.6)

## 2020-04-29 ENCOUNTER — PATIENT MESSAGE (OUTPATIENT)
Dept: MEDICAL GROUP | Facility: PHYSICIAN GROUP | Age: 35
End: 2020-04-29

## 2020-04-29 DIAGNOSIS — M10.9 GOUTY ARTHROPATHY: ICD-10-CM

## 2020-04-30 RX ORDER — INDOMETHACIN 75 MG/1
75 CAPSULE, EXTENDED RELEASE ORAL
Qty: 12 CAP | Refills: 0 | Status: SHIPPED | OUTPATIENT
Start: 2020-04-30 | End: 2020-06-24 | Stop reason: SDUPTHER

## 2020-06-09 ENCOUNTER — APPOINTMENT (OUTPATIENT)
Dept: MEDICAL GROUP | Facility: LAB | Age: 35
End: 2020-06-09
Payer: COMMERCIAL

## 2020-06-15 ENCOUNTER — APPOINTMENT (OUTPATIENT)
Dept: MEDICAL GROUP | Facility: LAB | Age: 35
End: 2020-06-15
Payer: COMMERCIAL

## 2020-06-24 ENCOUNTER — OFFICE VISIT (OUTPATIENT)
Dept: MEDICAL GROUP | Facility: LAB | Age: 35
End: 2020-06-24
Payer: COMMERCIAL

## 2020-06-24 VITALS
HEIGHT: 64 IN | SYSTOLIC BLOOD PRESSURE: 156 MMHG | WEIGHT: 195 LBS | RESPIRATION RATE: 12 BRPM | HEART RATE: 100 BPM | DIASTOLIC BLOOD PRESSURE: 122 MMHG | BODY MASS INDEX: 33.29 KG/M2 | OXYGEN SATURATION: 96 % | TEMPERATURE: 97.6 F

## 2020-06-24 DIAGNOSIS — E11.40 TYPE 2 DIABETES MELLITUS WITH DIABETIC NEUROPATHY, WITHOUT LONG-TERM CURRENT USE OF INSULIN (HCC): ICD-10-CM

## 2020-06-24 DIAGNOSIS — I10 ESSENTIAL HYPERTENSION: ICD-10-CM

## 2020-06-24 DIAGNOSIS — M10.9 GOUTY ARTHROPATHY: ICD-10-CM

## 2020-06-24 DIAGNOSIS — Z72.0 TOBACCO ABUSE: ICD-10-CM

## 2020-06-24 DIAGNOSIS — I25.10 CORONARY ARTERY DISEASE WITHOUT ANGINA PECTORIS, UNSPECIFIED VESSEL OR LESION TYPE, UNSPECIFIED WHETHER NATIVE OR TRANSPLANTED HEART: ICD-10-CM

## 2020-06-24 PROCEDURE — 99214 OFFICE O/P EST MOD 30 MIN: CPT | Performed by: FAMILY MEDICINE

## 2020-06-24 RX ORDER — LISINOPRIL AND HYDROCHLOROTHIAZIDE 20; 12.5 MG/1; MG/1
1 TABLET ORAL
Qty: 90 TAB | Refills: 3 | Status: ON HOLD | OUTPATIENT
Start: 2020-06-24 | End: 2020-07-24

## 2020-06-24 RX ORDER — INDOMETHACIN 75 MG/1
75 CAPSULE, EXTENDED RELEASE ORAL
Qty: 20 CAP | Refills: 0 | Status: SHIPPED | OUTPATIENT
Start: 2020-06-24 | End: 2020-07-28

## 2020-06-24 RX ORDER — ATORVASTATIN CALCIUM 20 MG/1
20 TABLET, FILM COATED ORAL DAILY
Qty: 90 TAB | Refills: 3 | Status: ON HOLD | OUTPATIENT
Start: 2020-06-24 | End: 2020-07-24

## 2020-06-24 RX ORDER — METOPROLOL SUCCINATE 100 MG/1
100 TABLET, EXTENDED RELEASE ORAL
Qty: 90 TAB | Refills: 3 | Status: ON HOLD | OUTPATIENT
Start: 2020-06-24 | End: 2020-07-24

## 2020-06-24 ASSESSMENT — ENCOUNTER SYMPTOMS
WHEEZING: 0
FEVER: 0
COUGH: 0
CONSTIPATION: 0
CHILLS: 0
NAUSEA: 0
DIARRHEA: 0
FOCAL WEAKNESS: 0
PALPITATIONS: 0
SHORTNESS OF BREATH: 0
VOMITING: 0
HEADACHES: 0
DEPRESSION: 0
SORE THROAT: 0
DIZZINESS: 0
BLURRED VISION: 0
ABDOMINAL PAIN: 0

## 2020-06-24 ASSESSMENT — PATIENT HEALTH QUESTIONNAIRE - PHQ9: CLINICAL INTERPRETATION OF PHQ2 SCORE: 0

## 2020-06-24 ASSESSMENT — FIBROSIS 4 INDEX: FIB4 SCORE: 0.49

## 2020-06-24 NOTE — PROGRESS NOTES
"Mina Burch is a 35 y.o. male with a history of hypertension, coronary artery disease, gout, and diabetes here to establish care and discuss medication refills.  Has been out of all medications for 1 month.    HPI:      Coronary artery disease  History of MI x2 with drug-eluting stents placed in 2017 and 2018.  He is unsure if he ever followed up with cardiology after his last hospitalization in 2018.  His current medications include metoprolol, lisinopril HCTZ, and atorvastatin.  He has not been taking these for the last month or so as he ran out.  No chest pain or shortness of breath.    Gout  Getting flares about every 2 weeks -gets flares to right elbow and ankles, right knee. Reports he was on \"water pills\" in the past in [facility].  Last used indomethacin 2 months ago and this does work well for his flares. No alcohol, he has lost weight recently    Diabetes  No metformin for the last month.  Last A1c was checked in October 2019 and 6.6.    Current medicines (including changes today)  Current Outpatient Medications   Medication Sig Dispense Refill   • lisinopril-hydrochlorothiazide (PRINZIDE) 20-12.5 MG per tablet TAKE 1 TABLET BY MOUTH EVERY DAY 30 Tab 0   • metoprolol SR (TOPROL XL) 100 MG TABLET SR 24 HR TAKE 1 TABLET BY MOUTH EVERY DAY 30 Tab 0   • atorvastatin (LIPITOR) 20 MG Tab TAKE 1 TABLET BY MOUTH EVERY EVENING 30 Tab 0   • indomethacin SR (INDOCIN SR) 75 MG Cap CR Take 1 Cap by mouth 2 times daily with meals as needed. 12 Cap 0     No current facility-administered medications for this visit.      He  has a past medical history of Diabetes ([facility]), Hyperlipidemia, Hypertension, MI (myocardial infarction) ([facility]) (2017), and Myocardial infarct ([facility]) (11/2018).  He  has a past surgical history that includes other cardiac surgery (11/2018) and other cardiac surgery (09/18/2017).  Social History     Tobacco Use   • Smoking status: Current Every Day Smoker     Packs/day: 0.50     Years: 14.00     Pack " "years: 7.00     Types: Cigarettes   • Smokeless tobacco: Former User     Types: Chew     Quit date: 2018   Substance Use Topics   • Alcohol use: Yes     Comment: occasionally    • Drug use: Yes     Types: Marijuana     Social History     Social History Narrative   • Not on file     Family History   Problem Relation Age of Onset   • Heart Disease Father    • Hypertension Father    • Heart Attack Father 45   • Diabetes Mother    • Hypertension Mother    • Cancer Maternal Grandfather         Brain    • No Known Problems Paternal Grandmother    • No Known Problems Paternal Grandfather    • No Known Problems Son    • No Known Problems Daughter      Family Status   Relation Name Status   • Fa  Alive   • Mo  Alive   • MGMo  Alive   • MGFa  (Not Specified)   • PGMo     • PGFa     • Son  Alive, age 14y   • Shelia  Alive, age 13y       ROS  Review of Systems   Constitutional: Negative for chills and fever.   HENT: Negative for congestion and sore throat.    Eyes: Negative for blurred vision.   Respiratory: Negative for cough, shortness of breath and wheezing.    Cardiovascular: Negative for chest pain and palpitations.   Gastrointestinal: Negative for abdominal pain, constipation, diarrhea, nausea and vomiting.   Genitourinary: Negative for dysuria and urgency.   Skin: Negative for rash.   Neurological: Negative for dizziness, focal weakness and headaches.   Psychiatric/Behavioral: Negative for depression.   All other systems reviewed and are negative.        Objective:     Physical Exam:  /122 (BP Location: Right arm, Patient Position: Sitting, BP Cuff Size: Large adult)   Pulse 100   Temp 36.4 °C (97.6 °F)   Resp 12   Ht 1.626 m (5' 4\")   Wt 88.5 kg (195 lb)   SpO2 96%  Body mass index is 33.47 kg/m².  Constitutional: Alert. Well appearing. No distress.  Skin: Warm, dry, good turgor, no visible rashes.  Eye: Equal, round and reactive to light, conjunctiva clear, lids normal.  ENMT: Moist mucous " membranes. Normal dentition. Oropharynx clear.  Neck: Trachea midline, no masses, no thyromegaly. No cervical or supraclavicular lymphadenopathy.  Respiratory: Normal effort. Lungs are clear to auscultation bilaterally.  Cardiovascular: Tachycardic Normal S1/S2. No murmurs, rubs or gallops.   Abdomen: Soft, non-tender, non-distended. No masses, no hepatosplenomegaly.  Neuro: Moves all four extremities. No facial droop.  Psych: Answers questions appropriately. Normal affect and mood.    Assessment and Plan:     1. Essential hypertension  Chronic issue.  Uncontrolled.  No medications for last month.  Lisinopril-hydrochlorothiazide refilled.  Close follow-up.  - lisinopril-hydrochlorothiazide (PRINZIDE) 20-12.5 MG per tablet; Take 1 Tab by mouth every day.  Dispense: 30 Tab; Refill: 0    2. Coronary artery disease without angina pectoris, unspecified vessel or lesion type, unspecified whether native or transplanted heart  History of MI x2 with drug-eluting stents placed in 2017 and 2018.  He is unsure if he ever followed up with cardiology after his last hospitalization in 2018.  His current medications include metoprolol, lisinopril HCTZ, and atorvastatin.  Referral placed to establish with cardiology.  - lisinopril-hydrochlorothiazide (PRINZIDE) 20-12.5 MG per tablet; Take 1 Tab by mouth every day.  Dispense: 30 Tab; Refill: 0  - atorvastatin (LIPITOR) 20 MG Tab; Take 1 Tab by mouth.  Dispense: 30 Tab; Refill: 0  - metoprolol SR (TOPROL XL) 100 MG TABLET SR 24 HR; Take 1 Tab by mouth every day.  Dispense: 30 Tab; Refill: 0    3. Gouty arthropathy  Chronic issue.  Reporting frequent flares.  Indomethacin refilled for flare treatment.  Checking uric acid will likely plan to start allopurinol.  - indomethacin SR (INDOCIN SR) 75 MG Cap CR; Take 1 Cap by mouth 2 times daily with meals as needed.  Dispense: 12 Cap; Refill: 0  - URIC ACID; Future    4. Type 2 diabetes mellitus with diabetic neuropathy, without long-term  current use of insulin (HCC)  No metformin for the last month.  Last A1c was checked in October 2019 and 6.6.  Guarding metformin and checking A1c.  - metFORMIN (GLUCOPHAGE) 500 MG Tab; Take 1 Tab by mouth 2 Times a Day for 90 days.  Dispense: 180 Tab; Refill: 0    5. Tobacco abuse  Encouraged cessation.    Follow up: Return in about 1 month (around 7/24/2020).         PLEASE NOTE: This note was created using voice recognition software.

## 2020-07-06 ENCOUNTER — HOSPITAL ENCOUNTER (OUTPATIENT)
Dept: LAB | Facility: MEDICAL CENTER | Age: 35
End: 2020-07-06
Attending: FAMILY MEDICINE
Payer: COMMERCIAL

## 2020-07-06 DIAGNOSIS — E11.40 TYPE 2 DIABETES MELLITUS WITH DIABETIC NEUROPATHY, WITHOUT LONG-TERM CURRENT USE OF INSULIN (HCC): ICD-10-CM

## 2020-07-06 DIAGNOSIS — M10.9 GOUTY ARTHROPATHY: ICD-10-CM

## 2020-07-06 DIAGNOSIS — I25.10 CORONARY ARTERY DISEASE WITHOUT ANGINA PECTORIS, UNSPECIFIED VESSEL OR LESION TYPE, UNSPECIFIED WHETHER NATIVE OR TRANSPLANTED HEART: ICD-10-CM

## 2020-07-06 DIAGNOSIS — I10 ESSENTIAL HYPERTENSION: ICD-10-CM

## 2020-07-06 LAB
ALBUMIN SERPL BCP-MCNC: 3.9 G/DL (ref 3.2–4.9)
ALBUMIN/GLOB SERPL: 1.2 G/DL
ALP SERPL-CCNC: 54 U/L (ref 30–99)
ALT SERPL-CCNC: 15 U/L (ref 2–50)
ANION GAP SERPL CALC-SCNC: 14 MMOL/L (ref 7–16)
AST SERPL-CCNC: 19 U/L (ref 12–45)
BASOPHILS # BLD AUTO: 0.8 % (ref 0–1.8)
BASOPHILS # BLD: 0.1 K/UL (ref 0–0.12)
BILIRUB SERPL-MCNC: 0.5 MG/DL (ref 0.1–1.5)
BUN SERPL-MCNC: 20 MG/DL (ref 8–22)
CALCIUM SERPL-MCNC: 8.8 MG/DL (ref 8.5–10.5)
CHLORIDE SERPL-SCNC: 102 MMOL/L (ref 96–112)
CHOLEST SERPL-MCNC: 197 MG/DL (ref 100–199)
CO2 SERPL-SCNC: 21 MMOL/L (ref 20–33)
CREAT SERPL-MCNC: 1.04 MG/DL (ref 0.5–1.4)
EOSINOPHIL # BLD AUTO: 0.23 K/UL (ref 0–0.51)
EOSINOPHIL NFR BLD: 1.9 % (ref 0–6.9)
ERYTHROCYTE [DISTWIDTH] IN BLOOD BY AUTOMATED COUNT: 38.4 FL (ref 35.9–50)
EST. AVERAGE GLUCOSE BLD GHB EST-MCNC: 146 MG/DL
FASTING STATUS PATIENT QL REPORTED: NORMAL
GLOBULIN SER CALC-MCNC: 3.3 G/DL (ref 1.9–3.5)
GLUCOSE SERPL-MCNC: 147 MG/DL (ref 65–99)
HBA1C MFR BLD: 6.7 % (ref 0–5.6)
HCT VFR BLD AUTO: 50.9 % (ref 42–52)
HDLC SERPL-MCNC: 38 MG/DL
HGB BLD-MCNC: 16.8 G/DL (ref 14–18)
IMM GRANULOCYTES # BLD AUTO: 0.06 K/UL (ref 0–0.11)
IMM GRANULOCYTES NFR BLD AUTO: 0.5 % (ref 0–0.9)
LDLC SERPL CALC-MCNC: 134 MG/DL
LYMPHOCYTES # BLD AUTO: 2.89 K/UL (ref 1–4.8)
LYMPHOCYTES NFR BLD: 23.7 % (ref 22–41)
MCH RBC QN AUTO: 28.3 PG (ref 27–33)
MCHC RBC AUTO-ENTMCNC: 33 G/DL (ref 33.7–35.3)
MCV RBC AUTO: 85.7 FL (ref 81.4–97.8)
MONOCYTES # BLD AUTO: 0.88 K/UL (ref 0–0.85)
MONOCYTES NFR BLD AUTO: 7.2 % (ref 0–13.4)
NEUTROPHILS # BLD AUTO: 8.04 K/UL (ref 1.82–7.42)
NEUTROPHILS NFR BLD: 65.9 % (ref 44–72)
NRBC # BLD AUTO: 0 K/UL
NRBC BLD-RTO: 0 /100 WBC
PLATELET # BLD AUTO: 327 K/UL (ref 164–446)
PMV BLD AUTO: 9.8 FL (ref 9–12.9)
POTASSIUM SERPL-SCNC: 3.7 MMOL/L (ref 3.6–5.5)
PROT SERPL-MCNC: 7.2 G/DL (ref 6–8.2)
RBC # BLD AUTO: 5.94 M/UL (ref 4.7–6.1)
SODIUM SERPL-SCNC: 137 MMOL/L (ref 135–145)
TRIGL SERPL-MCNC: 126 MG/DL (ref 0–149)
URATE SERPL-MCNC: 10.8 MG/DL (ref 2.5–8.3)
WBC # BLD AUTO: 12.2 K/UL (ref 4.8–10.8)

## 2020-07-06 PROCEDURE — 80061 LIPID PANEL: CPT

## 2020-07-06 PROCEDURE — 85025 COMPLETE CBC W/AUTO DIFF WBC: CPT

## 2020-07-06 PROCEDURE — 80053 COMPREHEN METABOLIC PANEL: CPT

## 2020-07-06 PROCEDURE — 84550 ASSAY OF BLOOD/URIC ACID: CPT

## 2020-07-06 PROCEDURE — 83036 HEMOGLOBIN GLYCOSYLATED A1C: CPT

## 2020-07-06 PROCEDURE — 36415 COLL VENOUS BLD VENIPUNCTURE: CPT

## 2020-07-07 RX ORDER — ALLOPURINOL 100 MG/1
100 TABLET ORAL DAILY
Qty: 30 TAB | Refills: 0 | Status: SHIPPED | OUTPATIENT
Start: 2020-07-07 | End: 2020-09-01 | Stop reason: SDUPTHER

## 2020-07-21 PROCEDURE — 99285 EMERGENCY DEPT VISIT HI MDM: CPT

## 2020-07-22 ENCOUNTER — HOSPITAL ENCOUNTER (INPATIENT)
Facility: MEDICAL CENTER | Age: 35
LOS: 2 days | DRG: 281 | End: 2020-07-24
Attending: EMERGENCY MEDICINE | Admitting: INTERNAL MEDICINE
Payer: COMMERCIAL

## 2020-07-22 ENCOUNTER — APPOINTMENT (OUTPATIENT)
Dept: RADIOLOGY | Facility: MEDICAL CENTER | Age: 35
DRG: 281 | End: 2020-07-22
Attending: INTERNAL MEDICINE
Payer: COMMERCIAL

## 2020-07-22 ENCOUNTER — APPOINTMENT (OUTPATIENT)
Dept: CARDIOLOGY | Facility: MEDICAL CENTER | Age: 35
DRG: 281 | End: 2020-07-22
Attending: INTERNAL MEDICINE
Payer: COMMERCIAL

## 2020-07-22 ENCOUNTER — APPOINTMENT (OUTPATIENT)
Dept: RADIOLOGY | Facility: MEDICAL CENTER | Age: 35
DRG: 281 | End: 2020-07-22
Attending: EMERGENCY MEDICINE
Payer: COMMERCIAL

## 2020-07-22 DIAGNOSIS — R07.9 CHEST PAIN, UNSPECIFIED TYPE: ICD-10-CM

## 2020-07-22 DIAGNOSIS — E11.40 TYPE 2 DIABETES MELLITUS WITH DIABETIC NEUROPATHY, WITHOUT LONG-TERM CURRENT USE OF INSULIN (HCC): ICD-10-CM

## 2020-07-22 DIAGNOSIS — I25.5 ISCHEMIC CARDIOMYOPATHY: ICD-10-CM

## 2020-07-22 DIAGNOSIS — I24.9 ACS (ACUTE CORONARY SYNDROME) (HCC): ICD-10-CM

## 2020-07-22 DIAGNOSIS — I25.119 CORONARY ARTERY DISEASE INVOLVING NATIVE CORONARY ARTERY OF NATIVE HEART WITH ANGINA PECTORIS (HCC): ICD-10-CM

## 2020-07-22 PROBLEM — N17.9 ARF (ACUTE RENAL FAILURE) (HCC): Status: ACTIVE | Noted: 2020-07-22

## 2020-07-22 PROBLEM — D75.1 POLYCYTHEMIA: Status: ACTIVE | Noted: 2020-07-22

## 2020-07-22 PROBLEM — J45.909 ASTHMA: Status: ACTIVE | Noted: 2020-07-22

## 2020-07-22 PROBLEM — D72.829 LEUKOCYTOSIS: Status: ACTIVE | Noted: 2020-07-22

## 2020-07-22 PROBLEM — E11.65 TYPE 2 DIABETES MELLITUS WITH HYPERGLYCEMIA (HCC): Status: ACTIVE | Noted: 2018-12-08

## 2020-07-22 LAB
ALBUMIN SERPL BCP-MCNC: 3.9 G/DL (ref 3.2–4.9)
ALBUMIN SERPL BCP-MCNC: 4.2 G/DL (ref 3.2–4.9)
ALBUMIN/GLOB SERPL: 1.6 G/DL
ALP SERPL-CCNC: 50 U/L (ref 30–99)
ALT SERPL-CCNC: 17 U/L (ref 2–50)
ANION GAP SERPL CALC-SCNC: 14 MMOL/L (ref 7–16)
APTT PPP: 182.1 SEC (ref 24.7–36)
APTT PPP: 225.8 SEC (ref 24.7–36)
APTT PPP: 32.6 SEC (ref 24.7–36)
APTT PPP: 63.8 SEC (ref 24.7–36)
AST SERPL-CCNC: 21 U/L (ref 12–45)
BASOPHILS # BLD AUTO: 0.9 % (ref 0–1.8)
BASOPHILS # BLD: 0.13 K/UL (ref 0–0.12)
BILIRUB SERPL-MCNC: 0.4 MG/DL (ref 0.1–1.5)
BUN SERPL-MCNC: 25 MG/DL (ref 8–22)
BUN SERPL-MCNC: 30 MG/DL (ref 8–22)
CALCIUM SERPL-MCNC: 9 MG/DL (ref 8.5–10.5)
CALCIUM SERPL-MCNC: 9.4 MG/DL (ref 8.5–10.5)
CHLORIDE SERPL-SCNC: 101 MMOL/L (ref 96–112)
CHLORIDE SERPL-SCNC: 102 MMOL/L (ref 96–112)
CO2 SERPL-SCNC: 21 MMOL/L (ref 20–33)
CO2 SERPL-SCNC: 23 MMOL/L (ref 20–33)
COVID ORDER STATUS COVID19: NORMAL
CREAT SERPL-MCNC: 1.64 MG/DL (ref 0.5–1.4)
CREAT SERPL-MCNC: 1.64 MG/DL (ref 0.5–1.4)
EKG IMPRESSION: NORMAL
EOSINOPHIL # BLD AUTO: 0.18 K/UL (ref 0–0.51)
EOSINOPHIL NFR BLD: 1.3 % (ref 0–6.9)
ERYTHROCYTE [DISTWIDTH] IN BLOOD BY AUTOMATED COUNT: 41.8 FL (ref 35.9–50)
EST. AVERAGE GLUCOSE BLD GHB EST-MCNC: 143 MG/DL
GLOBULIN SER CALC-MCNC: 2.7 G/DL (ref 1.9–3.5)
GLUCOSE BLD-MCNC: 114 MG/DL (ref 65–99)
GLUCOSE BLD-MCNC: 117 MG/DL (ref 65–99)
GLUCOSE BLD-MCNC: 144 MG/DL (ref 65–99)
GLUCOSE BLD-MCNC: 165 MG/DL (ref 65–99)
GLUCOSE SERPL-MCNC: 124 MG/DL (ref 65–99)
GLUCOSE SERPL-MCNC: 210 MG/DL (ref 65–99)
HBA1C MFR BLD: 6.6 % (ref 0–5.6)
HCT VFR BLD AUTO: 49.1 % (ref 42–52)
HCT VFR BLD AUTO: 52.5 % (ref 42–52)
HGB BLD-MCNC: 15.4 G/DL (ref 14–18)
HGB BLD-MCNC: 16.6 G/DL (ref 14–18)
IMM GRANULOCYTES # BLD AUTO: 0.05 K/UL (ref 0–0.11)
IMM GRANULOCYTES NFR BLD AUTO: 0.4 % (ref 0–0.9)
INR PPP: 0.98 (ref 0.87–1.13)
LV EJECT FRACT  99904: 20
LYMPHOCYTES # BLD AUTO: 3.38 K/UL (ref 1–4.8)
LYMPHOCYTES NFR BLD: 23.8 % (ref 22–41)
MCH RBC QN AUTO: 28.4 PG (ref 27–33)
MCHC RBC AUTO-ENTMCNC: 31.6 G/DL (ref 33.7–35.3)
MCV RBC AUTO: 89.9 FL (ref 81.4–97.8)
MONOCYTES # BLD AUTO: 0.6 K/UL (ref 0–0.85)
MONOCYTES NFR BLD AUTO: 4.2 % (ref 0–13.4)
NEUTROPHILS # BLD AUTO: 9.87 K/UL (ref 1.82–7.42)
NEUTROPHILS NFR BLD: 69.4 % (ref 44–72)
NRBC # BLD AUTO: 0 K/UL
NRBC BLD-RTO: 0 /100 WBC
PHOSPHATE SERPL-MCNC: 4.2 MG/DL (ref 2.5–4.5)
PLATELET # BLD AUTO: 346 K/UL (ref 164–446)
PMV BLD AUTO: 9.9 FL (ref 9–12.9)
POTASSIUM SERPL-SCNC: 4.1 MMOL/L (ref 3.6–5.5)
POTASSIUM SERPL-SCNC: 4.2 MMOL/L (ref 3.6–5.5)
PROT SERPL-MCNC: 6.9 G/DL (ref 6–8.2)
PROTHROMBIN TIME: 13.3 SEC (ref 12–14.6)
RBC # BLD AUTO: 5.84 M/UL (ref 4.7–6.1)
SARS-COV-2 RDRP RESP QL NAA+PROBE: NOTDETECTED
SODIUM SERPL-SCNC: 138 MMOL/L (ref 135–145)
SODIUM SERPL-SCNC: 138 MMOL/L (ref 135–145)
SPECIMEN SOURCE: NORMAL
TROPONIN T SERPL-MCNC: 111 NG/L (ref 6–19)
TROPONIN T SERPL-MCNC: 113 NG/L (ref 6–19)
TROPONIN T SERPL-MCNC: 344 NG/L (ref 6–19)
TROPONIN T SERPL-MCNC: 494 NG/L (ref 6–19)
TROPONIN T SERPL-MCNC: 667 NG/L (ref 6–19)
WBC # BLD AUTO: 14.2 K/UL (ref 4.8–10.8)

## 2020-07-22 PROCEDURE — 36415 COLL VENOUS BLD VENIPUNCTURE: CPT

## 2020-07-22 PROCEDURE — 85014 HEMATOCRIT: CPT

## 2020-07-22 PROCEDURE — 700111 HCHG RX REV CODE 636 W/ 250 OVERRIDE (IP): Performed by: INTERNAL MEDICINE

## 2020-07-22 PROCEDURE — 84484 ASSAY OF TROPONIN QUANT: CPT | Mod: 91

## 2020-07-22 PROCEDURE — 80069 RENAL FUNCTION PANEL: CPT

## 2020-07-22 PROCEDURE — A9270 NON-COVERED ITEM OR SERVICE: HCPCS | Performed by: INTERNAL MEDICINE

## 2020-07-22 PROCEDURE — 71045 X-RAY EXAM CHEST 1 VIEW: CPT

## 2020-07-22 PROCEDURE — 700102 HCHG RX REV CODE 250 W/ 637 OVERRIDE(OP): Performed by: INTERNAL MEDICINE

## 2020-07-22 PROCEDURE — 51798 US URINE CAPACITY MEASURE: CPT

## 2020-07-22 PROCEDURE — A9270 NON-COVERED ITEM OR SERVICE: HCPCS | Performed by: EMERGENCY MEDICINE

## 2020-07-22 PROCEDURE — 93306 TTE W/DOPPLER COMPLETE: CPT

## 2020-07-22 PROCEDURE — 93005 ELECTROCARDIOGRAM TRACING: CPT | Performed by: INTERNAL MEDICINE

## 2020-07-22 PROCEDURE — 94760 N-INVAS EAR/PLS OXIMETRY 1: CPT

## 2020-07-22 PROCEDURE — 83036 HEMOGLOBIN GLYCOSYLATED A1C: CPT

## 2020-07-22 PROCEDURE — 85025 COMPLETE CBC W/AUTO DIFF WBC: CPT

## 2020-07-22 PROCEDURE — 80053 COMPREHEN METABOLIC PANEL: CPT

## 2020-07-22 PROCEDURE — 85018 HEMOGLOBIN: CPT

## 2020-07-22 PROCEDURE — 99222 1ST HOSP IP/OBS MODERATE 55: CPT | Performed by: INTERNAL MEDICINE

## 2020-07-22 PROCEDURE — U0004 COV-19 TEST NON-CDC HGH THRU: HCPCS

## 2020-07-22 PROCEDURE — C9803 HOPD COVID-19 SPEC COLLECT: HCPCS | Performed by: EMERGENCY MEDICINE

## 2020-07-22 PROCEDURE — 93010 ELECTROCARDIOGRAM REPORT: CPT | Performed by: INTERNAL MEDICINE

## 2020-07-22 PROCEDURE — 700102 HCHG RX REV CODE 250 W/ 637 OVERRIDE(OP): Performed by: EMERGENCY MEDICINE

## 2020-07-22 PROCEDURE — 93005 ELECTROCARDIOGRAM TRACING: CPT

## 2020-07-22 PROCEDURE — 94640 AIRWAY INHALATION TREATMENT: CPT

## 2020-07-22 PROCEDURE — 82962 GLUCOSE BLOOD TEST: CPT

## 2020-07-22 PROCEDURE — 99291 CRITICAL CARE FIRST HOUR: CPT | Performed by: INTERNAL MEDICINE

## 2020-07-22 PROCEDURE — 84300 ASSAY OF URINE SODIUM: CPT

## 2020-07-22 PROCEDURE — 700102 HCHG RX REV CODE 250 W/ 637 OVERRIDE(OP): Performed by: NURSE PRACTITIONER

## 2020-07-22 PROCEDURE — 93306 TTE W/DOPPLER COMPLETE: CPT | Mod: 26 | Performed by: INTERNAL MEDICINE

## 2020-07-22 PROCEDURE — 700111 HCHG RX REV CODE 636 W/ 250 OVERRIDE (IP): Performed by: EMERGENCY MEDICINE

## 2020-07-22 PROCEDURE — 96375 TX/PRO/DX INJ NEW DRUG ADDON: CPT

## 2020-07-22 PROCEDURE — 96365 THER/PROPH/DIAG IV INF INIT: CPT

## 2020-07-22 PROCEDURE — 93005 ELECTROCARDIOGRAM TRACING: CPT | Performed by: EMERGENCY MEDICINE

## 2020-07-22 PROCEDURE — 85730 THROMBOPLASTIN TIME PARTIAL: CPT | Mod: 91

## 2020-07-22 PROCEDURE — 96366 THER/PROPH/DIAG IV INF ADDON: CPT

## 2020-07-22 PROCEDURE — 99233 SBSQ HOSP IP/OBS HIGH 50: CPT | Performed by: INTERNAL MEDICINE

## 2020-07-22 PROCEDURE — 81003 URINALYSIS AUTO W/O SCOPE: CPT

## 2020-07-22 PROCEDURE — A9270 NON-COVERED ITEM OR SERVICE: HCPCS | Performed by: NURSE PRACTITIONER

## 2020-07-22 PROCEDURE — 93975 VASCULAR STUDY: CPT | Mod: 26 | Performed by: INTERNAL MEDICINE

## 2020-07-22 PROCEDURE — 770020 HCHG ROOM/CARE - TELE (206)

## 2020-07-22 PROCEDURE — 700105 HCHG RX REV CODE 258: Performed by: INTERNAL MEDICINE

## 2020-07-22 PROCEDURE — 85610 PROTHROMBIN TIME: CPT

## 2020-07-22 PROCEDURE — 700101 HCHG RX REV CODE 250: Performed by: INTERNAL MEDICINE

## 2020-07-22 PROCEDURE — 93975 VASCULAR STUDY: CPT

## 2020-07-22 PROCEDURE — 82570 ASSAY OF URINE CREATININE: CPT

## 2020-07-22 RX ORDER — SODIUM CHLORIDE 9 MG/ML
INJECTION, SOLUTION INTRAVENOUS CONTINUOUS
Status: DISCONTINUED | OUTPATIENT
Start: 2020-07-22 | End: 2020-07-23

## 2020-07-22 RX ORDER — ASPIRIN 81 MG/1
324 TABLET, CHEWABLE ORAL DAILY
Status: DISCONTINUED | OUTPATIENT
Start: 2020-07-22 | End: 2020-07-24

## 2020-07-22 RX ORDER — ASPIRIN 325 MG
325 TABLET ORAL ONCE
Status: COMPLETED | OUTPATIENT
Start: 2020-07-22 | End: 2020-07-22

## 2020-07-22 RX ORDER — ATORVASTATIN CALCIUM 40 MG/1
40 TABLET, FILM COATED ORAL EVERY EVENING
Status: DISCONTINUED | OUTPATIENT
Start: 2020-07-22 | End: 2020-07-24 | Stop reason: HOSPADM

## 2020-07-22 RX ORDER — ASPIRIN 300 MG/1
300 SUPPOSITORY RECTAL DAILY
Status: DISCONTINUED | OUTPATIENT
Start: 2020-07-22 | End: 2020-07-24

## 2020-07-22 RX ORDER — DIAZEPAM 2 MG/1
2-5 TABLET ORAL EVERY 6 HOURS PRN
Status: DISCONTINUED | OUTPATIENT
Start: 2020-07-22 | End: 2020-07-24 | Stop reason: HOSPADM

## 2020-07-22 RX ORDER — HYDRALAZINE HYDROCHLORIDE 20 MG/ML
20 INJECTION INTRAMUSCULAR; INTRAVENOUS EVERY 6 HOURS PRN
Status: DISCONTINUED | OUTPATIENT
Start: 2020-07-22 | End: 2020-07-24 | Stop reason: HOSPADM

## 2020-07-22 RX ORDER — ONDANSETRON 2 MG/ML
4 INJECTION INTRAMUSCULAR; INTRAVENOUS EVERY 4 HOURS PRN
Status: DISCONTINUED | OUTPATIENT
Start: 2020-07-22 | End: 2020-07-24 | Stop reason: HOSPADM

## 2020-07-22 RX ORDER — IPRATROPIUM BROMIDE AND ALBUTEROL SULFATE 2.5; .5 MG/3ML; MG/3ML
3 SOLUTION RESPIRATORY (INHALATION)
Status: DISCONTINUED | OUTPATIENT
Start: 2020-07-22 | End: 2020-07-24 | Stop reason: HOSPADM

## 2020-07-22 RX ORDER — HEPARIN SODIUM 1000 [USP'U]/ML
3200 INJECTION, SOLUTION INTRAVENOUS; SUBCUTANEOUS PRN
Status: DISCONTINUED | OUTPATIENT
Start: 2020-07-22 | End: 2020-07-22

## 2020-07-22 RX ORDER — ACETAMINOPHEN 325 MG/1
650 TABLET ORAL EVERY 6 HOURS PRN
Status: DISCONTINUED | OUTPATIENT
Start: 2020-07-22 | End: 2020-07-24 | Stop reason: HOSPADM

## 2020-07-22 RX ORDER — ONDANSETRON 4 MG/1
4 TABLET, ORALLY DISINTEGRATING ORAL EVERY 4 HOURS PRN
Status: DISCONTINUED | OUTPATIENT
Start: 2020-07-22 | End: 2020-07-24 | Stop reason: HOSPADM

## 2020-07-22 RX ORDER — OXYCODONE HYDROCHLORIDE 10 MG/1
10 TABLET ORAL EVERY 4 HOURS PRN
Status: DISCONTINUED | OUTPATIENT
Start: 2020-07-22 | End: 2020-07-24 | Stop reason: HOSPADM

## 2020-07-22 RX ORDER — PROCHLORPERAZINE EDISYLATE 5 MG/ML
5-10 INJECTION INTRAMUSCULAR; INTRAVENOUS EVERY 4 HOURS PRN
Status: DISCONTINUED | OUTPATIENT
Start: 2020-07-22 | End: 2020-07-24 | Stop reason: HOSPADM

## 2020-07-22 RX ORDER — CLOPIDOGREL BISULFATE 75 MG/1
75 TABLET ORAL DAILY
Status: DISCONTINUED | OUTPATIENT
Start: 2020-07-23 | End: 2020-07-24 | Stop reason: HOSPADM

## 2020-07-22 RX ORDER — METOPROLOL SUCCINATE 50 MG/1
100 TABLET, EXTENDED RELEASE ORAL
Status: DISCONTINUED | OUTPATIENT
Start: 2020-07-22 | End: 2020-07-22

## 2020-07-22 RX ORDER — NITROGLYCERIN 0.4 MG/1
0.4 TABLET SUBLINGUAL
Status: DISCONTINUED | OUTPATIENT
Start: 2020-07-22 | End: 2020-07-24 | Stop reason: HOSPADM

## 2020-07-22 RX ORDER — PROMETHAZINE HYDROCHLORIDE 25 MG/1
12.5-25 TABLET ORAL EVERY 4 HOURS PRN
Status: DISCONTINUED | OUTPATIENT
Start: 2020-07-22 | End: 2020-07-24 | Stop reason: HOSPADM

## 2020-07-22 RX ORDER — BISACODYL 10 MG
10 SUPPOSITORY, RECTAL RECTAL
Status: DISCONTINUED | OUTPATIENT
Start: 2020-07-22 | End: 2020-07-24 | Stop reason: HOSPADM

## 2020-07-22 RX ORDER — HEPARIN SODIUM 1000 [USP'U]/ML
6000 INJECTION, SOLUTION INTRAVENOUS; SUBCUTANEOUS ONCE
Status: COMPLETED | OUTPATIENT
Start: 2020-07-22 | End: 2020-07-22

## 2020-07-22 RX ORDER — ISOSORBIDE DINITRATE 10 MG/1
5 TABLET ORAL 2 TIMES DAILY PRN
Status: DISCONTINUED | OUTPATIENT
Start: 2020-07-22 | End: 2020-07-24 | Stop reason: HOSPADM

## 2020-07-22 RX ORDER — ASPIRIN 325 MG
325 TABLET ORAL DAILY
Status: DISCONTINUED | OUTPATIENT
Start: 2020-07-22 | End: 2020-07-24

## 2020-07-22 RX ORDER — HEPARIN SODIUM 1000 [USP'U]/ML
3200 INJECTION, SOLUTION INTRAVENOUS; SUBCUTANEOUS PRN
Status: DISCONTINUED | OUTPATIENT
Start: 2020-07-22 | End: 2020-07-24

## 2020-07-22 RX ORDER — MORPHINE SULFATE 4 MG/ML
2-4 INJECTION, SOLUTION INTRAMUSCULAR; INTRAVENOUS
Status: DISCONTINUED | OUTPATIENT
Start: 2020-07-22 | End: 2020-07-24 | Stop reason: HOSPADM

## 2020-07-22 RX ORDER — ENALAPRILAT 1.25 MG/ML
1.25 INJECTION INTRAVENOUS EVERY 6 HOURS PRN
Status: DISCONTINUED | OUTPATIENT
Start: 2020-07-22 | End: 2020-07-22

## 2020-07-22 RX ORDER — SODIUM CHLORIDE 9 MG/ML
INJECTION, SOLUTION INTRAVENOUS CONTINUOUS
Status: DISCONTINUED | OUTPATIENT
Start: 2020-07-22 | End: 2020-07-22

## 2020-07-22 RX ORDER — HEPARIN SODIUM 5000 [USP'U]/100ML
INJECTION, SOLUTION INTRAVENOUS CONTINUOUS
Status: DISCONTINUED | OUTPATIENT
Start: 2020-07-22 | End: 2020-07-22

## 2020-07-22 RX ORDER — LOSARTAN POTASSIUM 25 MG/1
25 TABLET ORAL
Status: DISCONTINUED | OUTPATIENT
Start: 2020-07-22 | End: 2020-07-23

## 2020-07-22 RX ORDER — OXYCODONE HYDROCHLORIDE 5 MG/1
5 TABLET ORAL EVERY 4 HOURS PRN
Status: DISCONTINUED | OUTPATIENT
Start: 2020-07-22 | End: 2020-07-24 | Stop reason: HOSPADM

## 2020-07-22 RX ORDER — HEPARIN SODIUM 5000 [USP'U]/100ML
INJECTION, SOLUTION INTRAVENOUS CONTINUOUS
Status: DISCONTINUED | OUTPATIENT
Start: 2020-07-22 | End: 2020-07-24

## 2020-07-22 RX ORDER — SPIRONOLACTONE 25 MG/1
12.5 TABLET ORAL
Status: DISCONTINUED | OUTPATIENT
Start: 2020-07-22 | End: 2020-07-23

## 2020-07-22 RX ORDER — METOPROLOL SUCCINATE 50 MG/1
100 TABLET, EXTENDED RELEASE ORAL 2 TIMES DAILY
Status: DISCONTINUED | OUTPATIENT
Start: 2020-07-22 | End: 2020-07-24 | Stop reason: HOSPADM

## 2020-07-22 RX ORDER — POLYETHYLENE GLYCOL 3350 17 G/17G
1 POWDER, FOR SOLUTION ORAL
Status: DISCONTINUED | OUTPATIENT
Start: 2020-07-22 | End: 2020-07-24 | Stop reason: HOSPADM

## 2020-07-22 RX ORDER — PROMETHAZINE HYDROCHLORIDE 25 MG/1
12.5-25 SUPPOSITORY RECTAL EVERY 4 HOURS PRN
Status: DISCONTINUED | OUTPATIENT
Start: 2020-07-22 | End: 2020-07-24 | Stop reason: HOSPADM

## 2020-07-22 RX ORDER — AMOXICILLIN 250 MG
2 CAPSULE ORAL 2 TIMES DAILY
Status: DISCONTINUED | OUTPATIENT
Start: 2020-07-22 | End: 2020-07-24 | Stop reason: HOSPADM

## 2020-07-22 RX ORDER — CLOPIDOGREL BISULFATE 75 MG/1
300 TABLET ORAL ONCE
Status: COMPLETED | OUTPATIENT
Start: 2020-07-22 | End: 2020-07-22

## 2020-07-22 RX ORDER — DEXTROSE MONOHYDRATE 25 G/50ML
50 INJECTION, SOLUTION INTRAVENOUS
Status: DISCONTINUED | OUTPATIENT
Start: 2020-07-22 | End: 2020-07-24 | Stop reason: HOSPADM

## 2020-07-22 RX ADMIN — MORPHINE SULFATE 2 MG: 4 INJECTION INTRAVENOUS at 12:01

## 2020-07-22 RX ADMIN — METOPROLOL SUCCINATE 100 MG: 50 TABLET, EXTENDED RELEASE ORAL at 16:40

## 2020-07-22 RX ADMIN — HEPARIN SODIUM 6000 UNITS: 1000 INJECTION, SOLUTION INTRAVENOUS; SUBCUTANEOUS at 01:53

## 2020-07-22 RX ADMIN — ATORVASTATIN CALCIUM 40 MG: 40 TABLET, FILM COATED ORAL at 16:40

## 2020-07-22 RX ADMIN — SODIUM CHLORIDE: 9 INJECTION, SOLUTION INTRAVENOUS at 21:48

## 2020-07-22 RX ADMIN — METOPROLOL SUCCINATE 100 MG: 50 TABLET, EXTENDED RELEASE ORAL at 09:18

## 2020-07-22 RX ADMIN — SPIRONOLACTONE 12.5 MG: 25 TABLET ORAL at 15:37

## 2020-07-22 RX ADMIN — DOCUSATE SODIUM 50 MG AND SENNOSIDES 8.6 MG 2 TABLET: 8.6; 5 TABLET, FILM COATED ORAL at 16:40

## 2020-07-22 RX ADMIN — IPRATROPIUM BROMIDE AND ALBUTEROL SULFATE 3 ML: .5; 3 SOLUTION RESPIRATORY (INHALATION) at 16:46

## 2020-07-22 RX ADMIN — OXYCODONE 5 MG: 5 TABLET ORAL at 21:47

## 2020-07-22 RX ADMIN — SODIUM CHLORIDE: 9 INJECTION, SOLUTION INTRAVENOUS at 11:47

## 2020-07-22 RX ADMIN — HEPARIN SODIUM 1200 UNITS/HR: 5000 INJECTION, SOLUTION INTRAVENOUS at 06:05

## 2020-07-22 RX ADMIN — CLOPIDOGREL BISULFATE 300 MG: 75 TABLET ORAL at 15:35

## 2020-07-22 RX ADMIN — NITROGLYCERIN 0.4 MG: 0.4 TABLET, ORALLY DISINTEGRATING SUBLINGUAL at 11:47

## 2020-07-22 RX ADMIN — NITROGLYCERIN 0.4 MG: 0.4 TABLET, ORALLY DISINTEGRATING SUBLINGUAL at 11:21

## 2020-07-22 RX ADMIN — ASPIRIN 325 MG: 325 TABLET, FILM COATED ORAL at 01:10

## 2020-07-22 RX ADMIN — HEPARIN SODIUM 25000 UNITS: 5000 INJECTION, SOLUTION INTRAVENOUS at 01:53

## 2020-07-22 RX ADMIN — LOSARTAN POTASSIUM 25 MG: 25 TABLET, FILM COATED ORAL at 15:36

## 2020-07-22 RX ADMIN — INSULIN HUMAN 1 UNITS: 100 INJECTION, SOLUTION PARENTERAL at 07:43

## 2020-07-22 ASSESSMENT — LIFESTYLE VARIABLES
TOTAL SCORE: 0
CONSUMPTION TOTAL: NEGATIVE
ON A TYPICAL DAY WHEN YOU DRINK ALCOHOL HOW MANY DRINKS DO YOU HAVE: 0
DO YOU DRINK ALCOHOL: NO
TOTAL SCORE: 0
HOW MANY TIMES IN THE PAST YEAR HAVE YOU HAD 5 OR MORE DRINKS IN A DAY: 0
CONSUMPTION TOTAL: NEGATIVE
HOW MANY TIMES IN THE PAST YEAR HAVE YOU HAD 5 OR MORE DRINKS IN A DAY: 0
TOTAL SCORE: 0
HAVE YOU EVER FELT YOU SHOULD CUT DOWN ON YOUR DRINKING: NO
TOTAL SCORE: 0
ON A TYPICAL DAY WHEN YOU DRINK ALCOHOL HOW MANY DRINKS DO YOU HAVE: 0
HAVE PEOPLE ANNOYED YOU BY CRITICIZING YOUR DRINKING: NO
AVERAGE NUMBER OF DAYS PER WEEK YOU HAVE A DRINK CONTAINING ALCOHOL: 0
TOTAL SCORE: 0
DOES PATIENT WANT TO STOP DRINKING: NO
DOES PATIENT WANT TO STOP DRINKING: NO
EVER HAD A DRINK FIRST THING IN THE MORNING TO STEADY YOUR NERVES TO GET RID OF A HANGOVER: NO
ALCOHOL_USE: YES
EVER FELT BAD OR GUILTY ABOUT YOUR DRINKING: NO
HAVE YOU EVER FELT YOU SHOULD CUT DOWN ON YOUR DRINKING: NO
EVER_SMOKED: YES
EVER HAD A DRINK FIRST THING IN THE MORNING TO STEADY YOUR NERVES TO GET RID OF A HANGOVER: NO
AVERAGE NUMBER OF DAYS PER WEEK YOU HAVE A DRINK CONTAINING ALCOHOL: 0
EVER FELT BAD OR GUILTY ABOUT YOUR DRINKING: NO
HAVE PEOPLE ANNOYED YOU BY CRITICIZING YOUR DRINKING: NO
TOTAL SCORE: 0

## 2020-07-22 ASSESSMENT — ENCOUNTER SYMPTOMS
EYE PAIN: 0
FALLS: 0
COUGH: 0
DEPRESSION: 0
FEVER: 0
HEMOPTYSIS: 0
SHORTNESS OF BREATH: 0
HEADACHES: 0
MYALGIAS: 0
STRIDOR: 0
CHILLS: 0
SHORTNESS OF BREATH: 1
BRUISES/BLEEDS EASILY: 0
DIZZINESS: 0
BLURRED VISION: 0
WHEEZING: 0
EYE DISCHARGE: 0
ABDOMINAL PAIN: 0
TINGLING: 0
VOMITING: 0
NERVOUS/ANXIOUS: 0
SPEECH CHANGE: 0
CONSTIPATION: 0
NAUSEA: 0
PALPITATIONS: 0
LOSS OF CONSCIOUSNESS: 0
WEAKNESS: 0
DIARRHEA: 0
SPUTUM PRODUCTION: 0

## 2020-07-22 ASSESSMENT — FIBROSIS 4 INDEX
FIB4 SCORE: 0.52
FIB4 SCORE: 0.53

## 2020-07-22 ASSESSMENT — COPD QUESTIONNAIRES
DO YOU EVER COUGH UP ANY MUCUS OR PHLEGM?: NO/ONLY WITH OCCASIONAL COLDS OR INFECTIONS
COPD SCREENING SCORE: 2
DURING THE PAST 4 WEEKS HOW MUCH DID YOU FEEL SHORT OF BREATH: NONE/LITTLE OF THE TIME
HAVE YOU SMOKED AT LEAST 100 CIGARETTES IN YOUR ENTIRE LIFE: YES

## 2020-07-22 ASSESSMENT — PATIENT HEALTH QUESTIONNAIRE - PHQ9
SUM OF ALL RESPONSES TO PHQ9 QUESTIONS 1 AND 2: 0
2. FEELING DOWN, DEPRESSED, IRRITABLE, OR HOPELESS: NOT AT ALL
1. LITTLE INTEREST OR PLEASURE IN DOING THINGS: NOT AT ALL

## 2020-07-22 NOTE — ASSESSMENT & PLAN NOTE
-Cardiology recommends no invasive intervention due to patients medical noncompliance and inability to get medications, to which patient is agreeable  -continue heparin ggt 48 hrs   -Echo shows EF 20%   -continue ASA, plavix, statin, nitrate

## 2020-07-22 NOTE — ED TRIAGE NOTES
Mina Hickey Sp Burch   35 y.o. male   Chief Complaint   Patient presents with   • Shortness of Breath   • Chest Pressure      The patient presents to the ED via triage for evaluation of dyspnea and chest pressure. The patient attributes his symptoms to an asthma exacerbation but describes that he just doesn't feel like himself. The patient describes chest tightness that cannot be re-produced upon palpation. He states that it only hurts when he takes a deep breath.     Pt amb to triage with steady gait for above complaint.  Pt is alert and oriented, speaking in full sentences, follows commands and responds appropriately to questions. NAD. Resp are even and unlabored.   Pt placed in senior lounge. Pt educated on triage process. Pt encouraged to alert staff for any changes.

## 2020-07-22 NOTE — ASSESSMENT & PLAN NOTE
-Likely due to dehydration and possible hypoperfusion  -mildly improved with fluids   -renal US negative for medical renal disease   -urine lytes pending

## 2020-07-22 NOTE — CONSULTS
Cardiology consult    Requested by Dr. Lane Drew      REASON FOR CONSULT: Abnormal EKG, chest pressure    HPI: 35-year-old male with history of ischemic cardiomyopathy.  Previous stenting of the LAD and stenting of the circumflex.  2017 and 2018.  Comes in with shortness of breath and what the patient feels as an asthma exacerbation.  Mild chest pressure.  Different than his previous acute coronary syndrome per patient.  No radiation of pain, no nausea or vomiting. Symptoms have lasted approximately 24 hours.  States they were brought on with smoke exposure.  Diabetes mellitus.  Hypertension.  Smoking.  Patient has improved lifestyle with less smoking and weight loss.  Works at [employer].  Positive family history of heart disease.  Currently with very mild shortness of breath.  EF of 25% on last echocardiogram.      MEDICATIONS:      Current Facility-Administered Medications:   •  [COMPLETED] heparin injection 6,000 Units, 6,000 Units, Intravenous, Once, 6,000 Units at 07/22/20 0153 **AND** heparin injection 3,200 Units, 3,200 Units, Intravenous, PRN **AND** heparin infusion 25,000 units in 500 mL 0.45% NACL, , Intravenous, Continuous, 25,000 Units at 07/22/20 0153 **AND** Protocol 440 Heparin Weight Based DO NOT GIVE ANY HEPARIN BOLUS TO STROKE PATIENT, , , CONTINUOUS **AND** Protocol 440 Heparin Weight Based Discontinue Enoxaparin (Lovenox), Dabigatran (Pradaxa), Rivaroxaban (Xarelto), Apixaban (Eliquis), Edoxaban (Savaysa, Lixiana), Fondaparinux (Arixtra) and Argatroban prior to heparin administration, , , CONTINUOUS **AND** Protocol 440 Heparin Weight Based Draw baseline aPTT, PT, and platelet count if not already done, , , CONTINUOUS **AND** Protocol 440 Heparin Weight Based Draw aPTT 6 hours after beginning infusion. , , , CONTINUOUS **AND** Protocol 440 Heparin Weight Based Draw Platelet count every three days. Contact MD if platelet is 50% lower than baseline count., , , CONTINUOUS **AND** Protocol 440  Heparin Weight Based Record Patient Data, , , CONTINUOUS **AND** Protocol 440 Heparin Weight Based INSTRUCTIONS, , , CONTINUOUS **AND** Protocol 440 Heparin Weight Based Review aPTT results 6 hours after infusion is begun as detailed, , , CONTINUOUS **AND** Protocol 440 Heparin Weight Based Adjust heparin to maintain aPTT between 55-96 sec, , , CONTINUOUS **AND** Protocol 440 Heparin Weight Based Order aPTT 6 hours after any rate change or hold until aPTT is therapeutic (55-96 seconds), , , CONTINUOUS **AND** Protocol 440 Heparin Weight Based Documentation and verification, , , CONTINUOUS, Lane Drew M.D.    Current Outpatient Medications:   •  allopurinol (ZYLOPRIM) 100 MG Tab, Take 1 Tab by mouth every day., Disp: 30 Tab, Rfl: 0  •  lisinopril-hydrochlorothiazide (PRINZIDE) 20-12.5 MG per tablet, Take 1 Tab by mouth every day., Disp: 90 Tab, Rfl: 3  •  atorvastatin (LIPITOR) 20 MG Tab, Take 1 Tab by mouth every day., Disp: 90 Tab, Rfl: 3  •  metoprolol SR (TOPROL XL) 100 MG TABLET SR 24 HR, Take 1 Tab by mouth every day., Disp: 90 Tab, Rfl: 3  •  indomethacin SR (INDOCIN SR) 75 MG Cap CR, Take 1 Cap by mouth 2 times daily with meals as needed., Disp: 20 Cap, Rfl: 0  •  metFORMIN (GLUCOPHAGE) 500 MG Tab, Take 1 Tab by mouth 2 Times a Day for 90 days., Disp: 180 Tab, Rfl: 0    ALLERGIES:   Mr. Sp Burch  has No Known Allergies.     SURGERIES:  Mr. Sp Burch   has a past surgical history that includes other cardiac surgery (11/2018) and other cardiac surgery (09/18/2017).     MEDICAL ILLNESSES:  Mr. Sp Burch   has a past medical history of Diabetes ([facility]), Hyperlipidemia, Hypertension, MI (myocardial infarction) ([facility]) (2017), and Myocardial infarct ([facility]) (11/2018).     SOCIAL HISTORY:  Mr. Sp Burch   reports that he has been smoking cigarettes. He has a 7.00 pack-year smoking history. He quit smokeless tobacco use about 19 months ago.  His smokeless tobacco use included chew. He reports  current alcohol use. He reports current drug use. Drug: Marijuana.     FAMILY HISTORY:  Mr.'s Sp Burch  family history includes Cancer in his maternal grandfather; Diabetes in his mother; Heart Attack (age of onset: 45) in his father; Heart Disease in his father; Hypertension in his father and mother; No Known Problems in his daughter, paternal grandfather, paternal grandmother, and son.      ROS:  Review of Systems   Constitutional: Negative for chills and fever.   HENT: Negative for congestion.    Eyes: Negative for blurred vision, pain and discharge.   Respiratory: Positive for shortness of breath. Negative for cough, hemoptysis and wheezing.    Cardiovascular: Positive for chest pain. Negative for palpitations.   Gastrointestinal: Negative for abdominal pain, nausea and vomiting.   Musculoskeletal: Negative for joint pain and myalgias.   Skin: Negative for itching and rash.   Neurological: Negative for speech change and loss of consciousness.   Endo/Heme/Allergies: Does not bruise/bleed easily.   Psychiatric/Behavioral: Negative for depression. The patient is not nervous/anxious.    All other systems reviewed and are negative.    In addition to the above, a complete review of system was performed and all other organs systems were normal    PHYSICAL EXAM:  Physical Exam   Constitutional: He is oriented to person, place, and time and well-developed, well-nourished, and in no distress.   HENT:   Head: Normocephalic and atraumatic.   Mouth/Throat: Oropharynx is clear and moist.   Eyes: Pupils are equal, round, and reactive to light. EOM are normal.   Neck: Normal range of motion. Neck supple. No thyromegaly present.   Cardiovascular: Normal rate, regular rhythm, normal heart sounds and intact distal pulses. Exam reveals no gallop and no friction rub.   No murmur heard.  Pulmonary/Chest: Effort normal.   Mild wheezing   Abdominal: Soft. Bowel sounds are normal.   Musculoskeletal: Normal range of motion.          "General: No tenderness or edema.   Neurological: He is alert and oriented to person, place, and time.   Skin: Skin is warm and dry. No rash noted.   Psychiatric: Mood, memory, affect and judgment normal.       /68   Pulse (!) 54   Temp 36.1 °C (97 °F) (Oral)   Resp 15   Ht 1.626 m (5' 4\")   Wt 89.7 kg (197 lb 12 oz)   SpO2 99%   BMI 33.94 kg/m²      Lab Results   Component Value Date/Time    CHOLSTRLTOT 197 07/06/2020 06:59 AM     (H) 07/06/2020 06:59 AM    HDL 38 (A) 07/06/2020 06:59 AM    TRIGLYCERIDE 126 07/06/2020 06:59 AM       Lab Results   Component Value Date/Time    SODIUM 138 07/22/2020 12:34 AM    POTASSIUM 4.2 07/22/2020 12:34 AM    CHLORIDE 101 07/22/2020 12:34 AM    CO2 23 07/22/2020 12:34 AM    GLUCOSE 210 (H) 07/22/2020 12:34 AM    BUN 25 (H) 07/22/2020 12:34 AM    CREATININE 1.64 (H) 07/22/2020 12:34 AM     Lab Results   Component Value Date/Time    ALKPHOSPHAT 50 07/22/2020 12:34 AM    ASTSGOT 21 07/22/2020 12:34 AM    ALTSGPT 17 07/22/2020 12:34 AM    TBILIRUBIN 0.4 07/22/2020 12:34 AM      Lab Results   Component Value Date/Time    BNPBTYPENAT 189 (H) 12/07/2018 10:11 PM         Recent Labs     07/22/20  0034   WBC 14.2*   RBC 5.84   HEMOGLOBIN 16.6   HEMATOCRIT 52.5*   MCV 89.9   MCH 28.4   MCHC 31.6*   RDW 41.8   PLATELETCT 346   MPV 9.9   Echocardiogram from 11/1/2018  CONCLUSIONS  No prior study is available for comparison.   Severely reduced left ventricular systolic function. Left ventricular   ejection fraction is visually estimated to be 25%.  No evidence of valvular abnormality based on Doppler evaluation.        EKG: Sinus rhythm with J-point elevation V1 and V2, T wave inversions inferior and lateral old.  No reciprocal ST depression.  Elevation somewhat more prominent than previous EKG.  Results for orders placed or performed during the hospital encounter of 07/22/20   EKG   Result Value Ref Range    Report       [facility] Emergency " Dept.    Test Date:  2020  Pt Name:    BRYCE CONNER         Department: ER  MRN:        [id number]                      Room:  Gender:     Male                         Technician: [id number]  :        1985                   Requested By:ER TRIAGE PROTOCOL  Order #:    [id number]                    Reading MD:    Measurements  Intervals                                Axis  Rate:       61                           P:          60  LA:         144                          QRS:        -2  QRSD:       102                          T:          185  QT:         484  QTc:        488    Interpretive Statements  SINUS RHYTHM  LVH WITH SECONDARY REPOLARIZATION ABNORMALITY  ANTERIOR INJURY, EARLY ACUTE INFARCT  BORDERLINE PROLONGED QT INTERVAL  Compared to ECG 2018 06:25:02  Left ventricular hypertrophy now present  Early repolarization now present  Myocardial infarct finding now present  Possible ischemia no longer present         IMAGING:   DX-CHEST-PORTABLE (1 VIEW)   Final Result         1.  No acute cardiopulmonary disease.   2.  Cardiomegaly            Patient Active Problem List    Diagnosis Date Noted   • Ischemic cardiomyopathy 2018     Priority: High   • Coronary artery disease involving native coronary artery of native heart with angina pectoris ([facility]) 2018     Priority: High   • Tobacco abuse 2018     Priority: Low   • Gout of multiple sites 10/28/2019   • Obesity (BMI 35.0-39.9 without comorbidity) ([facility]) 2019   • Essential hypertension 2018   • Type 2 diabetes mellitus with diabetic neuropathy, without long-term current use of insulin ([facility]) 2018       ASSESSMENT AND PLAN:   1.  Possible asthma exacerbation lasting greater than 24 hours versus acute coronary syndrome.  Patient received aspirin and heparin.  Respiratory treatment.  Initial troponin just over 100.  Plan is for serial troponins.  Second troponin unchanged.  2.  Hypertension well controlled.  3.   Diabetes mellitus.  4.  We will continue to follow.

## 2020-07-22 NOTE — PROGRESS NOTES
Received report from ER RN, Kristie. Pt is resting in [facility] and states that chest pain is same as it has been. Hooked to zoll monitor and transported to unit via rney. Once on unit hooked to tele monitor SB 50, oriented to room, use of call light, and safety precautions. POC addressed, white board updated. No further questions at this time.

## 2020-07-22 NOTE — ED PROVIDER NOTES
ED Provider Note    CHIEF COMPLAINT  Chief Complaint   Patient presents with   • Shortness of Breath   • Chest Pressure       HPI  Mina Burch is a 35 y.o. male who presents with chest pain.  The patient states he is had chest pressure and shortness of breath over the last 24 hours.  He states there is no known exacerbating or relieving factors.  He attributes this to an asthma exacerbation.  He has not had any recent fevers nor cough.  He does have a significant cardiac history with stents placed in 2017 as well as November 2018.  He also has diabetes, dyslipidemia, and hypertension.  Currently the pain is mild in intensity.  He does not have any associated nausea nor diaphoresis.  He has not noted any pain or swelling to his lower extremities.    REVIEW OF SYSTEMS  See HPI for further details. All other systems are negative.     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   Social Needs   • Financial resource strain: Not on file   • Food insecurity     Worry: Not on file     Inability: Not on file   • Transportation needs     Medical: Not on file     Non-medical: Not on file   Tobacco Use   • Smoking status: Current Every Day Smoker     Packs/day: 0.50     Years: 14.00     Pack years: 7.00     Types: Cigarettes   • Smokeless tobacco: Former User     Types: Chew     Quit date: 12/2018   Substance and Sexual Activity   • Alcohol use: Yes     Comment: occasionally    • Drug use: Yes     Types:  "Marijuana   • Sexual activity: Never     Partners: Female     Comment:     Lifestyle   • Physical activity     Days per week: Not on file     Minutes per session: Not on file   • Stress: Not on file   Relationships   • Social connections     Talks on phone: Not on file     Gets together: Not on file     Attends Judaism service: Not on file     Active member of club or organization: Not on file     Attends meetings of clubs or organizations: Not on file     Relationship status: Not on file   • Intimate partner violence     Fear of current or ex partner: Not on file     Emotionally abused: Not on file     Physically abused: Not on file     Forced sexual activity: Not on file   Other Topics Concern   • Not on file   Social History Narrative   • Not on file       SURGICAL HISTORY  Past Surgical History:   Procedure Laterality Date   • OTHER CARDIAC SURGERY  11/2018    1 stent placed   • OTHER CARDIAC SURGERY  09/18/2017    2 stents       CURRENT MEDICATIONS  Home Medications    **Home medications have not yet been reviewed for this encounter**         ALLERGIES  No Known Allergies    PHYSICAL EXAM  VITAL SIGNS: /89   Pulse 62   Temp 36.1 °C (97 °F) (Oral)   Resp 18   Ht 1.626 m (5' 4\")   Wt 89.7 kg (197 lb 12 oz)   SpO2 96%   BMI 33.94 kg/m²       Constitutional: in acute distress, Non-toxic appearance.   HENT: Normocephalic, Atraumatic, Bilateral external ears normal, Oropharynx moist, No oral exudates, Nose normal.   Eyes: PERRLA, EOMI, Conjunctiva normal, No discharge.   Neck: Normal range of motion, No tenderness, Supple, No stridor.   Lymphatic: No lymphadenopathy noted.   Cardiovascular: Normal heart rate, Normal rhythm, No murmurs, No rubs, No gallops.   Thorax & Lungs: Symmetrically diminished throughout, No respiratory distress, No wheezing, No chest tenderness.   Abdomen: Bowel sounds normal, Soft, No tenderness, No masses, No pulsatile masses.   Skin: Warm, Dry, No erythema, No rash. "   Back: No tenderness, No CVA tenderness.   Extremities: Intact distal pulses, No edema, No tenderness, No cyanosis, No clubbing. Neurologic: Alert & oriented x 3, Normal motor function, Normal sensory function, No focal deficits noted.   Psychiatric: Affect normal, Judgment normal, Mood normal.     Results for orders placed or performed during the hospital encounter of 07/22/20   CBC with Differential   Result Value Ref Range    WBC 14.2 (H) 4.8 - 10.8 K/uL    RBC 5.84 4.70 - 6.10 M/uL    Hemoglobin 16.6 14.0 - 18.0 g/dL    Hematocrit 52.5 (H) 42.0 - 52.0 %    MCV 89.9 81.4 - 97.8 fL    MCH 28.4 27.0 - 33.0 pg    MCHC 31.6 (L) 33.7 - 35.3 g/dL    RDW 41.8 35.9 - 50.0 fL    Platelet Count 346 164 - 446 K/uL    MPV 9.9 9.0 - 12.9 fL    Neutrophils-Polys 69.40 44.00 - 72.00 %    Lymphocytes 23.80 22.00 - 41.00 %    Monocytes 4.20 0.00 - 13.40 %    Eosinophils 1.30 0.00 - 6.90 %    Basophils 0.90 0.00 - 1.80 %    Immature Granulocytes 0.40 0.00 - 0.90 %    Nucleated RBC 0.00 /100 WBC    Neutrophils (Absolute) 9.87 (H) 1.82 - 7.42 K/uL    Lymphs (Absolute) 3.38 1.00 - 4.80 K/uL    Monos (Absolute) 0.60 0.00 - 0.85 K/uL    Eos (Absolute) 0.18 0.00 - 0.51 K/uL    Baso (Absolute) 0.13 (H) 0.00 - 0.12 K/uL    Immature Granulocytes (abs) 0.05 0.00 - 0.11 K/uL    NRBC (Absolute) 0.00 K/uL   Complete Metabolic Panel (CMP)   Result Value Ref Range    Sodium 138 135 - 145 mmol/L    Potassium 4.2 3.6 - 5.5 mmol/L    Chloride 101 96 - 112 mmol/L    Co2 23 20 - 33 mmol/L    Anion Gap 14.0 7.0 - 16.0    Glucose 210 (H) 65 - 99 mg/dL    Bun 25 (H) 8 - 22 mg/dL    Creatinine 1.64 (H) 0.50 - 1.40 mg/dL    Calcium 9.4 8.5 - 10.5 mg/dL    AST(SGOT) 21 12 - 45 U/L    ALT(SGPT) 17 2 - 50 U/L    Alkaline Phosphatase 50 30 - 99 U/L    Total Bilirubin 0.4 0.1 - 1.5 mg/dL    Albumin 4.2 3.2 - 4.9 g/dL    Total Protein 6.9 6.0 - 8.2 g/dL    Globulin 2.7 1.9 - 3.5 g/dL    A-G Ratio 1.6 g/dL   Troponin   Result Value Ref Range    Troponin T 111  (H) 6 - 19 ng/L   COVID/SARS CoV-2 PCR    Specimen: Nasopharyngeal; Respirate   Result Value Ref Range    COVID Order Status Received    PTT   Result Value Ref Range    APTT 32.6 24.7 - 36.0 sec   Prothrombin Time   Result Value Ref Range    PT 13.3 12.0 - 14.6 sec    INR 0.98 0.87 - 1.13   SARS-CoV-2, PCR (In-House)   Result Value Ref Range    SARS-CoV-2 Source Nasal Swab     SARS-CoV-2 (RdRp gene) NotDetected    ESTIMATED GFR   Result Value Ref Range    GFR If  58 (A) >60 mL/min/1.73 m 2    GFR If Non  48 (A) >60 mL/min/1.73 m 2   TROPONIN   Result Value Ref Range    Troponin T 113 (H) 6 - 19 ng/L   EKG   Result Value Ref Range    Report       [facility] Emergency Dept.    Test Date:  2020  Pt Name:    BRYCE CONNER         Department: ER  MRN:        [id number]                      Room:  Gender:     Male                         Technician: [id number]  :        1985                   Requested By:ER TRIAGE PROTOCOL  Order #:    [id number]                    Reading MD: ROSA SAM MD    Measurements  Intervals                                Axis  Rate:       61                           P:          60  IL:         144                          QRS:        -2  QRSD:       102                          T:          185  QT:         484  QTc:        488    Interpretive Statements  Twelve-lead EKG shows a normal sinus rhythm with a ventricular to 61, QRS  shows  evidence of left ventricular hypertrophy, the patient does have J-point  elevation versus ST segment elevation in V1 and V2.  He has T wave inversion  laterally and inferiorly.  I was able to compare this to an old EKG and the T    w ave inversion was not acute but the J-point elevation appeared to be acute  and  therefore I did contact cardiology for possible ST segment elevation  myocardial  infarction.  Electronically Signed On 2020 2:53:55 PDT by ROSA SAM MD       Repeat  twelve-lead EKG shows a sinus bradycardia with a ventricular to 59, continue J-point elevation and T wave inversion noted in the prior EKG with no dynamic changes  RADIOLOGY/PROCEDURES  DX-CHEST-PORTABLE (1 VIEW)   Final Result         1.  No acute cardiopulmonary disease.   2.  Cardiomegaly            COURSE & MEDICAL DECISION MAKING  Pertinent Labs & Imaging studies reviewed. (See chart for details)  This a 35-year-old male who presents the emerge department chest pain and difficulty with breathing.  The patient felt like it was from his asthma however he did not have any wheezing.  He was slightly diminished throughout.  Chest x-ray does not show any evidence of a pulmonary source such as pneumonia.  His presenting EKG was concerning with J-point elevation therefore I contacted cardiology emergently to review the EKG.  They felt this was most likely due to J-point elevation.  His troponin came back elevated and I did heparinize the patient at this point.  We repeated an EKG as well as a troponin and fortunately does not show any dynamic changes.  I suspect his troponin to be significantly elevated if he does have an acute myocardial infarction as he is had the pain for 24 hours.  The patient has had stable hemodynamics.  He states he has very mild discomfort at the time of admission.  He states he has continued dyspnea.  I did send a COVID swab.  The patient's abdomen is benign therefore referred pain would be unlikely.  Due to his risk factors as well as his EKG abnormalities we will bit the patient to the hospital for further work-up and treatment with cardiology in consultation.    FINAL IMPRESSION  1.  Chest pain  2.  Dyspnea    Disposition  The patient will be admitted in stable condition         Electronically signed by: Lane Drew M.D., 7/22/2020 12:59 AM

## 2020-07-22 NOTE — PROGRESS NOTES
· 2 RN skin check complete with ANA Chew.  · Devices in place PIV, tele box  · Skin assessed under devices yes  · Confirmed pressure ulcers found on n/a  · New potential pressure ulcers noted on n/a. Wound consult placed and wound reported n/a.   · The following interventions in place: patient turns self, pillows in use for support and positioning, moisturizer    Assessment:  · All skin intact  · Bilateral feet/heels dry and calloused

## 2020-07-22 NOTE — PROGRESS NOTES
Cardiology Follow Up Progress Note    Date of Service  7/22/2020    Attending Physician  Arturo Buck M.D.    Chief Complaint   Chest pressure    HPI  Mina Burch is a 35 y.o. male admitted 7/22/2020 with ischemic cardiomyopathy, EF 25%, previous stenting to the LAD in 2017 and circumflex 2018.  Patient presents with chest pressure and shortness of breath with feels he is having an asthma exacerbation.  His chest pressure is different when he had previous ACS.  There is no radiation, nausea or vomiting associated.    Patient has continued to have this chest pressure throughout the day.  He was given 2 mg of morphine just after noon.  ECG stable. Awaiting another troponin.    Interim Events  Ongoing chest pressure with no association of radiation, nausea, or vomiting  Echo shows severely reduced systolic function with EF 20%, was 25%, and global hypokinesis  May have to take to Cath Lab today  Continue heparin drip for now    Review of Systems  Review of Systems   Constitutional: Negative for chills and fever.   Respiratory: Negative for cough, shortness of breath and wheezing.    Cardiovascular: Positive for chest pain. Negative for palpitations and leg swelling.   Gastrointestinal: Negative for nausea and vomiting.   Neurological: Negative for dizziness and headaches.   All other systems reviewed and are negative.      Vital signs in last 24 hours  Temp:  [35.9 °C (96.7 °F)-37 °C (98.6 °F)] 37 °C (98.6 °F)  Pulse:  [54-62] 56  Resp:  [15-20] 16  BP: (103-128)/(68-89) 112/78  SpO2:  [94 %-100 %] 99 %    Physical Exam  Physical Exam  Vitals signs and nursing note reviewed.   Constitutional:       Appearance: Normal appearance.   HENT:      Head: Normocephalic and atraumatic.      Mouth/Throat:      Mouth: Mucous membranes are moist.   Eyes:      Extraocular Movements: Extraocular movements intact.   Neck:      Musculoskeletal: Normal range of motion and neck supple.      Comments: No  JVD  Cardiovascular:      Rate and Rhythm: Normal rate and regular rhythm.      Pulses: Normal pulses.      Heart sounds: Normal heart sounds. No murmur.   Pulmonary:      Effort: Pulmonary effort is normal.      Breath sounds: Normal breath sounds.   Abdominal:      Palpations: Abdomen is soft.   Skin:     General: Skin is warm and dry.   Neurological:      General: No focal deficit present.      Mental Status: He is alert and oriented to person, place, and time.   Psychiatric:         Mood and Affect: Mood normal.         Behavior: Behavior normal.         Lab Review  Lab Results   Component Value Date/Time    WBC 14.2 (H) 2020 12:34 AM    RBC 5.84 2020 12:34 AM    HEMOGLOBIN 16.6 2020 12:34 AM    HEMATOCRIT 52.5 (H) 2020 12:34 AM    MCV 89.9 2020 12:34 AM    MCH 28.4 2020 12:34 AM    MCHC 31.6 (L) 2020 12:34 AM    MPV 9.9 2020 12:34 AM      Lab Results   Component Value Date/Time    SODIUM 138 2020 12:34 AM    POTASSIUM 4.2 2020 12:34 AM    CHLORIDE 101 2020 12:34 AM    CO2 23 2020 12:34 AM    GLUCOSE 210 (H) 2020 12:34 AM    BUN 25 (H) 2020 12:34 AM    CREATININE 1.64 (H) 2020 12:34 AM      Lab Results   Component Value Date/Time    ASTSGOT 21 2020 12:34 AM    ALTSGPT 17 2020 12:34 AM     Lab Results   Component Value Date/Time    CHOLSTRLTOT 197 2020 06:59 AM     (H) 2020 06:59 AM    HDL 38 (A) 2020 06:59 AM    TRIGLYCERIDE 126 2020 06:59 AM    TROPONINT 344 (H) 2020 07:59 AM       No results for input(s): NTPROBNP in the last 72 hours.    Cardiac Imaging and Procedures Review  EK2020  SINUS BRADYCARDIA   ST ELEV, PROBABLE NORMAL EARLY REPOL PATTERN VERSUS INJURY   MULTIPLE ATRIAL PREMATURE COMPLEXES   Compared to ECG 2020 00:44:11   ST (T wave) deviation still present   Electronically Signed On 2020 6:49:08 PDT by Stanislaw Garrido MD     Echocardiogram:  7/22/2020  CONCLUSIONS  Severely reduced left ventricular systolic function. Left ventricular   ejection fraction is visually estimated to be 20%.   Indeterminate diastolic function.  Normal inferior vena cava size without inspiratory collapse.    Cardiac Catheterization:      Imaging  Chest X-Ray: 7/22/2020  1.  No acute cardiopulmonary disease.  2.  Cardiomegaly        Assessment/Plan  1.  Ischemic cardiomyopathy  - EF today 20% down from 25%  - Continue ASA, atorvastatin 40 mg every evening, heparin drip, and metoprolol  mg 2 times daily      2.  Elevated troponins  - 111> 113> 344  -Medical management for now  - May have to go to Cath Lab      3.  HTN  -Well-controlled  -Continue metoprolol            Thank you for allowing me to participate in the care of this patient.  I will continue to follow this patient    Please contact me with any questions.        DIMITRI Curry  [facility]  [phone]    Future Appointments   Date Time Provider Department Center   7/28/2020  3:20 PM Song Camarillo M.D. [facility] None       Please note that this dictation was created using voice recognition software. I have worked with consultants from the vendor as well as technical experts from [facility] to optimize the interface. I have made every reasonable attempt to correct obvious errors, but I expect that there are errors of grammar and possibly content I did not discover before finalizing the note.

## 2020-07-22 NOTE — PROGRESS NOTES
Patient seen and examined this am.     35 y.o. male who presented 7/22/2020 with past medical history of diabetes, hypertension, hyperlipidemia, CAD s/p 2 BELINDA, gout, and ischemic cardiomyopathy who presented for chest pain and shortness of breath. Initial EKG showed ST elevation determined to be J-point elevation by cardiology.    Patient continues to complain of midsternal nonradiating chest pain and dyspnea that is unchanged.     Echo pending. Continue ASA, statin, BB    KENDAL- hold nephrotoxic medications, IV fluids, repeat renal panel     Asthma- continue breathing treatments      Last troponin 344. Continue heparin ggt. Cardiology notified, continue medical management.

## 2020-07-22 NOTE — THERAPY
Missed Cardiac Rehab Eval    Patient Name: Mina Burch  Age:  35 y.o., Sex:  male  Medical Record #: [id number]  Today's Date: 7/22/2020    Discussed missed therapy with RN       07/22/20 [id number]   Interdisciplinary Plan of Care Collaboration   IDT Collaboration with  Nursing   Collaboration Comments RN asked to hold on cardiac rehab eval. Pt is possible undergoing LHC today. Will check in later this afternoon.

## 2020-07-22 NOTE — ASSESSMENT & PLAN NOTE
-Hold home oral medications and continue insulin sliding scale  -Adjust as needed  -hemoglobin A1c 6.6

## 2020-07-22 NOTE — ASSESSMENT & PLAN NOTE
-Due to dehydration or hypoxia from tobacco use   -mildly elevated and fluctuating, continue aspirin

## 2020-07-22 NOTE — H&P
Hospital Medicine History & Physical Note    Date of Service  7/22/2020    Primary Care Physician  Song Camarillo M.D.    Code Status  Full Code    Chief Complaint  Chief Complaint   Patient presents with   • Shortness of Breath   • Chest Pressure       History of Presenting Illness  35 y.o. male who presented 7/22/2020 with shortness of breath.  Patient does have a history of asthma, states that the smoke in the air began bothering him yesterday and he became short of breath and today it significantly worsened so he presented to the emergency department.  He also complains of chest pain which he states started Monday morning while he was driving.  He also had some mild shortness of breath at that time due to the smoke but it did get bad until the next day.  He states his pain is substernal, a tightness, 6/10 at its worse.  He states the pain is intermittent but he has not noted any provoking or alleviating factors.  He does have a history of coronary artery disease and has had stents in 2017 and 2018 but states this pain is much different.  His biggest concern is his shortness of breath.  Upon arrival, there was ST elevation on EKG, cardiology was called and felt that it was a J-point elevation, troponin has been around 200.  I did discuss the case including labs and imaging with the ER physician.    Review of Systems  Review of Systems   Constitutional: Negative for chills, fever and malaise/fatigue.   HENT: Negative for congestion.    Respiratory: Positive for shortness of breath. Negative for cough, sputum production and stridor.    Cardiovascular: Positive for chest pain. Negative for palpitations and leg swelling.   Gastrointestinal: Negative for abdominal pain, constipation, diarrhea, nausea and vomiting.   Genitourinary: Negative for dysuria and urgency.   Musculoskeletal: Negative for falls and myalgias.   Neurological: Negative for dizziness, tingling, loss of consciousness, weakness and  headaches.   Psychiatric/Behavioral: Negative for depression and suicidal ideas.   All other systems reviewed and are negative.      Past Medical History   has a past medical history of Diabetes ([facility]), Hyperlipidemia, Hypertension, MI (myocardial infarction) ([facility]) (2017), and Myocardial infarct ([facility]) (11/2018).    Surgical History   has a past surgical history that includes other cardiac surgery (11/2018) and other cardiac surgery (09/18/2017).     Family History  family history includes Cancer in his maternal grandfather; Diabetes in his mother; Heart Attack (age of onset: 45) in his father; Heart Disease in his father; Hypertension in his father and mother; No Known Problems in his daughter, paternal grandfather, paternal grandmother, and son.     Social History   reports that he has been smoking cigarettes. He has a 7.00 pack-year smoking history. He quit smokeless tobacco use about 19 months ago.  His smokeless tobacco use included chew. He reports current alcohol use. He reports current drug use. Drug: Marijuana.    Allergies  No Known Allergies    Medications  Prior to Admission Medications   Prescriptions Last Dose Informant Patient Reported? Taking?   allopurinol (ZYLOPRIM) 100 MG Tab   No No   Sig: Take 1 Tab by mouth every day.   atorvastatin (LIPITOR) 20 MG Tab   No No   Sig: Take 1 Tab by mouth every day.   indomethacin SR (INDOCIN SR) 75 MG Cap CR   No No   Sig: Take 1 Cap by mouth 2 times daily with meals as needed.   lisinopril-hydrochlorothiazide (PRINZIDE) 20-12.5 MG per tablet   No No   Sig: Take 1 Tab by mouth every day.   metFORMIN (GLUCOPHAGE) 500 MG Tab   No No   Sig: Take 1 Tab by mouth 2 Times a Day for 90 days.   metoprolol SR (TOPROL XL) 100 MG TABLET SR 24 HR   No No   Sig: Take 1 Tab by mouth every day.      Facility-Administered Medications: None       Physical Exam  Temp:  [36.1 °C (97 °F)] 36.1 °C (97 °F)  Pulse:  [54-62] 60  Resp:  [15-20] 16  BP: (103-128)/(68-89) 112/75  SpO2:  [94  %-99 %] 99 %    Physical Exam  Vitals signs and nursing note reviewed.   Constitutional:       General: He is not in acute distress.     Appearance: He is well-developed. He is not toxic-appearing or diaphoretic.   HENT:      Head: Normocephalic and atraumatic.      Right Ear: External ear normal.      Left Ear: External ear normal.      Nose: Nose normal. No congestion or rhinorrhea.      Mouth/Throat:      Mouth: Mucous membranes are dry.      Pharynx: No oropharyngeal exudate.   Eyes:      General:         Right eye: No discharge.         Left eye: No discharge.      Extraocular Movements: Extraocular movements intact.   Neck:      Musculoskeletal: Normal range of motion and neck supple. No edema or erythema.      Trachea: No tracheal deviation.   Cardiovascular:      Rate and Rhythm: Normal rate and regular rhythm.      Heart sounds: No murmur. No friction rub. No gallop.    Pulmonary:      Effort: Pulmonary effort is normal. No respiratory distress.      Breath sounds: Normal breath sounds. No stridor. No wheezing or rales.   Chest:      Chest wall: No tenderness.   Abdominal:      General: Bowel sounds are normal. There is no distension.      Palpations: Abdomen is soft.      Tenderness: There is no abdominal tenderness.   Musculoskeletal: Normal range of motion.         General: No tenderness.      Right lower leg: No edema.      Left lower leg: No edema.   Lymphadenopathy:      Cervical: No cervical adenopathy.   Skin:     General: Skin is warm and dry.      Coloration: Skin is not jaundiced.      Findings: No erythema or rash.   Neurological:      General: No focal deficit present.      Mental Status: He is alert and oriented to person, place, and time.      Cranial Nerves: No cranial nerve deficit.   Psychiatric:         Mood and Affect: Mood normal.         Behavior: Behavior normal.         Thought Content: Thought content normal.         Judgment: Judgment normal.         Laboratory:  Recent Labs      07/22/20 0034   WBC 14.2*   RBC 5.84   HEMOGLOBIN 16.6   HEMATOCRIT 52.5*   MCV 89.9   MCH 28.4   MCHC 31.6*   RDW 41.8   PLATELETCT 346   MPV 9.9     Recent Labs     07/22/20 0034   SODIUM 138   POTASSIUM 4.2   CHLORIDE 101   CO2 23   GLUCOSE 210*   BUN 25*   CREATININE 1.64*   CALCIUM 9.4     Recent Labs     07/22/20 0034   ALTSGPT 17   ASTSGOT 21   ALKPHOSPHAT 50   TBILIRUBIN 0.4   GLUCOSE 210*     Recent Labs     07/22/20 0034   APTT 32.6   INR 0.98     No results for input(s): NTPROBNP in the last 72 hours.      Recent Labs     07/22/20 0034 07/22/20  0200   TROPONINT 111* 113*       Imaging:  DX-CHEST-PORTABLE (1 VIEW)   Final Result         1.  No acute cardiopulmonary disease.   2.  Cardiomegaly      EC-ECHOCARDIOGRAM COMPLETE W/O CONT    (Results Pending)         Assessment/Plan:    * ACS (acute coronary syndrome) (HCC)- (present on admission)  Assessment & Plan  -I did personally review his EKG, noted sinus rhythm with ST elevation which is possibly J-point elevation in V1 and V2 as well as inverted T waves in multiple leads  -Troponin is greater than 100  -Cardiology was called by ERP and recommended to continue to trend troponin  -Await additional recommendations per cardiology  -Trend troponin, monitor on telemetry and obtain an echocardiogram  -I will start a heparin drip    Asthma- (present on admission)  Assessment & Plan  -No acute exacerbation but he does still feel short of breath  -obtain respiratory consult    Polycythemia- (present on admission)  Assessment & Plan  -Due to dehydration or hypoxia  -Repeat CBC in the morning    ARF (acute renal failure) (HCC)- (present on admission)  Assessment & Plan  -Likely due to dehydration and possible hypoperfusion  -Most recent echocardiogram in 2018 showed an ejection fraction of 25%, avoid IV fluids  -He does have multiple medications that would worsen his renal function, I will hold these  -Repeat BMP in the morning    Leukocytosis- (present on  admission)  Assessment & Plan  -Likely reactive, no additional sign of infection  -No antibiotics needed  -Repeat CBC in the morning    Gout of multiple sites- (present on admission)  Assessment & Plan  -Hold home allopurinol and indomethacin due to renal failure  -No acute gouty attack    Type 2 diabetes mellitus with hyperglycemia (HCC)- (present on admission)  Assessment & Plan  -Hold home oral medications and start insulin sliding scale  -Adjust as needed  -Obtain hemoglobin A1c    Essential hypertension- (present on admission)  Assessment & Plan  -Currently it is borderline low, at this point in time I feel it safest to hold his home meds  -Start PRN enalapril  -adjust as needed    Patient is critically ill.   The patient continues to have : ACS  The vital organ system that is effected is the: cardiac initially   If untreated there is a high chance of deterioration into: cardiogenic shock and death   The critical care that I am providing today is: heparin gtt   The critical care that has been undertaken is medically complex.   There has been no overlap in critical care time.  Critical care time not including procedures, no overlap: 36 minutes

## 2020-07-23 PROBLEM — D72.829 LEUKOCYTOSIS: Status: RESOLVED | Noted: 2020-07-22 | Resolved: 2020-07-23

## 2020-07-23 LAB
ANION GAP SERPL CALC-SCNC: 15 MMOL/L (ref 7–16)
APPEARANCE UR: CLEAR
APTT PPP: 74 SEC (ref 24.7–36)
BILIRUB UR QL STRIP.AUTO: NEGATIVE
BUN SERPL-MCNC: 32 MG/DL (ref 8–22)
CALCIUM SERPL-MCNC: 8.8 MG/DL (ref 8.5–10.5)
CHLORIDE SERPL-SCNC: 104 MMOL/L (ref 96–112)
CHOLEST SERPL-MCNC: 164 MG/DL (ref 100–199)
CO2 SERPL-SCNC: 17 MMOL/L (ref 20–33)
COLOR UR: YELLOW
CREAT SERPL-MCNC: 1.57 MG/DL (ref 0.5–1.4)
CREAT UR-MCNC: 193.6 MG/DL
ERYTHROCYTE [DISTWIDTH] IN BLOOD BY AUTOMATED COUNT: 42.5 FL (ref 35.9–50)
GLUCOSE BLD-MCNC: 118 MG/DL (ref 65–99)
GLUCOSE BLD-MCNC: 125 MG/DL (ref 65–99)
GLUCOSE BLD-MCNC: 140 MG/DL (ref 65–99)
GLUCOSE BLD-MCNC: 96 MG/DL (ref 65–99)
GLUCOSE SERPL-MCNC: 143 MG/DL (ref 65–99)
GLUCOSE UR STRIP.AUTO-MCNC: NEGATIVE MG/DL
HCT VFR BLD AUTO: 50 % (ref 42–52)
HDLC SERPL-MCNC: 34 MG/DL
HGB BLD-MCNC: 16.2 G/DL (ref 14–18)
KETONES UR STRIP.AUTO-MCNC: NEGATIVE MG/DL
LDLC SERPL CALC-MCNC: 107 MG/DL
LEUKOCYTE ESTERASE UR QL STRIP.AUTO: NEGATIVE
MCH RBC QN AUTO: 28.6 PG (ref 27–33)
MCHC RBC AUTO-ENTMCNC: 32.4 G/DL (ref 33.7–35.3)
MCV RBC AUTO: 88.3 FL (ref 81.4–97.8)
MICRO URNS: NORMAL
NITRITE UR QL STRIP.AUTO: NEGATIVE
PH UR STRIP.AUTO: 5 [PH] (ref 5–8)
PLATELET # BLD AUTO: 282 K/UL (ref 164–446)
PMV BLD AUTO: 9.7 FL (ref 9–12.9)
POTASSIUM SERPL-SCNC: 4.4 MMOL/L (ref 3.6–5.5)
PROT UR QL STRIP: NEGATIVE MG/DL
RBC # BLD AUTO: 5.66 M/UL (ref 4.7–6.1)
RBC UR QL AUTO: NEGATIVE
SODIUM SERPL-SCNC: 136 MMOL/L (ref 135–145)
SODIUM UR-SCNC: 27 MMOL/L
SP GR UR STRIP.AUTO: 1.02
TRIGL SERPL-MCNC: 116 MG/DL (ref 0–149)
UROBILINOGEN UR STRIP.AUTO-MCNC: 0.2 MG/DL
WBC # BLD AUTO: 13.2 K/UL (ref 4.8–10.8)

## 2020-07-23 PROCEDURE — 700102 HCHG RX REV CODE 250 W/ 637 OVERRIDE(OP): Performed by: INTERNAL MEDICINE

## 2020-07-23 PROCEDURE — 85730 THROMBOPLASTIN TIME PARTIAL: CPT

## 2020-07-23 PROCEDURE — 700105 HCHG RX REV CODE 258: Performed by: INTERNAL MEDICINE

## 2020-07-23 PROCEDURE — 700111 HCHG RX REV CODE 636 W/ 250 OVERRIDE (IP): Performed by: INTERNAL MEDICINE

## 2020-07-23 PROCEDURE — A9270 NON-COVERED ITEM OR SERVICE: HCPCS | Performed by: INTERNAL MEDICINE

## 2020-07-23 PROCEDURE — 99232 SBSQ HOSP IP/OBS MODERATE 35: CPT | Performed by: INTERNAL MEDICINE

## 2020-07-23 PROCEDURE — 80061 LIPID PANEL: CPT

## 2020-07-23 PROCEDURE — 80048 BASIC METABOLIC PNL TOTAL CA: CPT

## 2020-07-23 PROCEDURE — 85027 COMPLETE CBC AUTOMATED: CPT

## 2020-07-23 PROCEDURE — 99233 SBSQ HOSP IP/OBS HIGH 50: CPT | Performed by: INTERNAL MEDICINE

## 2020-07-23 PROCEDURE — 770020 HCHG ROOM/CARE - TELE (206)

## 2020-07-23 PROCEDURE — 36415 COLL VENOUS BLD VENIPUNCTURE: CPT

## 2020-07-23 PROCEDURE — 82962 GLUCOSE BLOOD TEST: CPT

## 2020-07-23 RX ORDER — LOSARTAN POTASSIUM 50 MG/1
50 TABLET ORAL
Status: DISCONTINUED | OUTPATIENT
Start: 2020-07-24 | End: 2020-07-24

## 2020-07-23 RX ORDER — SPIRONOLACTONE 25 MG/1
25 TABLET ORAL
Status: DISCONTINUED | OUTPATIENT
Start: 2020-07-24 | End: 2020-07-24 | Stop reason: HOSPADM

## 2020-07-23 RX ADMIN — SODIUM CHLORIDE: 9 INJECTION, SOLUTION INTRAVENOUS at 07:09

## 2020-07-23 RX ADMIN — SPIRONOLACTONE 12.5 MG: 25 TABLET ORAL at 04:37

## 2020-07-23 RX ADMIN — ATORVASTATIN CALCIUM 40 MG: 40 TABLET, FILM COATED ORAL at 16:36

## 2020-07-23 RX ADMIN — LOSARTAN POTASSIUM 25 MG: 25 TABLET, FILM COATED ORAL at 04:37

## 2020-07-23 RX ADMIN — HYDRALAZINE HYDROCHLORIDE 20 MG: 20 INJECTION INTRAMUSCULAR; INTRAVENOUS at 09:44

## 2020-07-23 RX ADMIN — CLOPIDOGREL BISULFATE 75 MG: 75 TABLET ORAL at 04:37

## 2020-07-23 RX ADMIN — HEPARIN SODIUM 950 UNITS/HR: 5000 INJECTION, SOLUTION INTRAVENOUS at 04:43

## 2020-07-23 RX ADMIN — ASPIRIN 325 MG: 325 TABLET, FILM COATED ORAL at 04:37

## 2020-07-23 RX ADMIN — METOPROLOL SUCCINATE 100 MG: 50 TABLET, EXTENDED RELEASE ORAL at 04:37

## 2020-07-23 RX ADMIN — METOPROLOL SUCCINATE 100 MG: 50 TABLET, EXTENDED RELEASE ORAL at 16:35

## 2020-07-23 ASSESSMENT — FIBROSIS 4 INDEX: FIB4 SCORE: 0.63

## 2020-07-23 ASSESSMENT — ENCOUNTER SYMPTOMS: SHORTNESS OF BREATH: 1

## 2020-07-23 NOTE — ASSESSMENT & PLAN NOTE
Echo shows worsening EF 20%, prior echo EF 25%  Continue metoprolol   Start losartan, spironolactone

## 2020-07-23 NOTE — PROGRESS NOTES
[facility] Medicine Daily Progress Note    Date of Service  7/23/2020    Chief Complaint  35 y.o. male admitted 7/22/2020 with shortness of breath and chest pain.    Hospital Course    35 y.o male with past medical history of diabetes, hypertension, hyperlipidemia, CAD s/p 2 BELINDA, gout, ischemic cardiomyopathy and medical noncompliance who presented for chest pain and shortness of breath. Initial EKG showed ST elevation determined to be J-point elevation by cardiology. Patient had been off cardiac medications for 2 months. Troponins elevated, no ST elevations on EKG. Due to patient's noncompliance and his inability to get medications patient agreed not to proceed with invasive cardiac intervention. Patient also presenting with ARF baseline Cr 1.04, renal US negative and Cr mildly improved with fluids.         Interval Problem Update  Patient reports that his chest pain is improved, states now pain is intermittent 3/10. States he still feels mildly dyspneic. Was able to get up and walk around the unit.      Consultants/Specialty  Cardiology     Code Status  Full Code     Disposition  Pending cardiology clearance and heparin for 48 hrs     Review of Systems  Review of Systems   Respiratory: Positive for shortness of breath.    Cardiovascular: Positive for chest pain.   Genitourinary: Negative for dysuria.        Physical Exam  Temp:  [35.9 °C (96.7 °F)-36.6 °C (97.8 °F)] 36.6 °C (97.8 °F)  Pulse:  [50-65] 61  Resp:  [16-18] 16  BP: ()/() 155/114  SpO2:  [93 %-100 %] 100 %    Physical Exam  Constitutional:       General: He is not in acute distress.     Appearance: Normal appearance. He is not diaphoretic.   HENT:      Head: Normocephalic and atraumatic.   Cardiovascular:      Rate and Rhythm: Normal rate and regular rhythm.      Pulses: Normal pulses.   Pulmonary:      Effort: Pulmonary effort is normal. No respiratory distress.      Breath sounds: Normal breath sounds. No wheezing or rhonchi.   Abdominal:       General: Abdomen is flat. There is no distension.      Palpations: Abdomen is soft.      Tenderness: There is no abdominal tenderness.   Neurological:      General: No focal deficit present.      Mental Status: He is alert.      Motor: No weakness.         Fluids    Intake/Output Summary (Last 24 hours) at 7/23/2020 0949  Last data filed at 7/22/2020 2332  Gross per 24 hour   Intake --   Output 600 ml   Net -600 ml       Laboratory  Recent Labs     07/22/20  0034 07/22/20  1159 07/23/20  0432   WBC 14.2*  --  13.2*   RBC 5.84  --  5.66   HEMOGLOBIN 16.6 15.4 16.2   HEMATOCRIT 52.5* 49.1 50.0   MCV 89.9  --  88.3   MCH 28.4  --  28.6   MCHC 31.6*  --  32.4*   RDW 41.8  --  42.5   PLATELETCT 346  --  282   MPV 9.9  --  9.7     Recent Labs     07/22/20  0034 07/22/20  1159 07/23/20  0432   SODIUM 138 138 136   POTASSIUM 4.2 4.1 4.4   CHLORIDE 101 102 104   CO2 23 21 17*   GLUCOSE 210* 124* 143*   BUN 25* 30* 32*   CREATININE 1.64* 1.64* 1.57*   CALCIUM 9.4 9.0 8.8     Recent Labs     07/22/20  0034  07/22/20  1420 07/22/20  2225 07/23/20  0432   APTT 32.6   < > 182.1* 63.8* 74.0*   INR 0.98  --   --   --   --     < > = values in this interval not displayed.         Recent Labs     07/23/20  0432   TRIGLYCERIDE 116   HDL 34*   *       Imaging  US-RENAL ARTERY DUPLEX COMP   Final Result      EC-ECHOCARDIOGRAM COMPLETE W/O CONT   Final Result      DX-CHEST-PORTABLE (1 VIEW)   Final Result         1.  No acute cardiopulmonary disease.   2.  Cardiomegaly           Assessment/Plan  * ACS (acute coronary syndrome) (HCC)- (present on admission)  Assessment & Plan  -Cardiology recommends no invasive intervention due to patients medical noncompliance and inability to get medications, to which patient is agreeable  -continue heparin ggt 48 hrs   -Echo shows EF 20%   -continue ASA, plavix, statin, nitrate     Ischemic cardiomyopathy- (present on admission)  Assessment & Plan  Echo shows worsening EF 20%, prior echo EF  25%  Continue metoprolol   Start losartan, spironolactone       Polycythemia- (present on admission)  Assessment & Plan  -Due to dehydration or hypoxia from tobacco use   -mildly elevated and fluctuating, continue aspirin     ARF (acute renal failure) (HCC)- (present on admission)  Assessment & Plan  -Likely due to dehydration and possible hypoperfusion  -mildly improved with fluids   -renal US negative for medical renal disease   -urine lytes pending     Leukocytosis- (present on admission)  Assessment & Plan  -Likely reactive, no additional sign of infection, improving   -No antibiotics needed    Asthma- (present on admission)  Assessment & Plan  -No acute exacerbation, shortness of breath improved  -continue duonebs PRN per RT     Gout of multiple sites- (present on admission)  Assessment & Plan  -Hold home allopurinol and indomethacin due to renal failure  -No acute gouty attack    Type 2 diabetes mellitus with hyperglycemia (HCC)- (present on admission)  Assessment & Plan  -Hold home oral medications and continue insulin sliding scale  -Adjust as needed  -hemoglobin A1c 6.6    Essential hypertension- (present on admission)  Assessment & Plan  -started BB, ARB, spironolactone, and nitrate   -hydralazine PRN        VTE prophylaxis: heparin ggt

## 2020-07-23 NOTE — PROGRESS NOTES
[facility] Cardiology Follow-up Note    Date of Service:    7/23/2020      Name:   Mina Olmstead   YOB: 1985  Age:   35 y.o.  male   MRN:   [id number]      Chief Complaint: NSTEMI    Primary Cardiologist:  Never seen in clinic    HPI:  Mr Burch is a 36 y/o fellow with PMH include premature CAD with hx of stents to the LAD in 2017, with ACS in 11/2018 s/p BELINDA to the LCx.  He has ICMO with LVEF 20-25%.  He had HTN, Diabetes mellitus type II, and asthma.   He presented to [facility] on 7/22/20 with c/o shortness of breath and chest pressure.     He has leukocytosis, KENDAL, troponin elevation consistent with NSTEMI.    States he had been out of his medications for approx 2 months due to PCP changes at his clinic and inability to obtain his meds.    Interim Events:  Patient states he is feeling better today  Still 2/10 substernal chest pain.  shortness of breath is improved.    I discussed again the patient's ability to obtain his meds.  He said he could not guarantee he has access, stating if his doctor leaves again, he may not be able to get them.      ROS  Constitutional:  denies fatigue.  Respiratory:  shortness of breath- improved, no cough.  Cardiovascular:  +chest pain.  denies lower extremity edema.  Denies orthopnea or PND.  :denies polyuria, no dysuria.  GI:  Denies nausea/vomiting.  No abdominal distention.  Neuro:  Denies dizziness, syncope.  Hem/lymph: Denies easy bleeding/bruising.      All other review of systems reviewed and negative.    Past medical, surgical, social, and family history reviewed and unchanged from admission except as noted in assessment and plan.    Medications: Reviewed in MAR  Current Facility-Administered Medications   Medication Dose Frequency Provider Last Rate Last Dose   • senna-docusate (PERICOLACE or SENOKOT S) 8.6-50 MG per tablet 2 Tab  2 Tab BID Donnie Eisenberg D.O.   2 Tab at 07/22/20 1640    And   • polyethylene glycol/lytes  (MIRALAX) PACKET 1 Packet  1 Packet QDAY PRN Donnie Eisenberg D.O.        And   • magnesium hydroxide (MILK OF MAGNESIA) suspension 30 mL  30 mL QDAY PRN Donnie Eisenberg D.O.        And   • bisacodyl (DULCOLAX) suppository 10 mg  10 mg QDAY PRN Donnie Eisenberg D.O.       • Respiratory Therapy Consult   Continuous RT Donnie Eisenberg D.O.       • acetaminophen (TYLENOL) tablet 650 mg  650 mg Q6HRS PRN Donnie Eisenberg D.O.       • aspirin (ASA) tablet 325 mg  325 mg DAILY JUSTIN ReynoldsO.   325 mg at 07/23/20 0437    Or   • aspirin (ASA) chewable tab 324 mg  324 mg DAILY Donnie Eisenberg D.O.        Or   • aspirin (ASA) suppository 300 mg  300 mg DAILY Donnie Eisenberg D.O.       • atorvastatin (LIPITOR) tablet 40 mg  40 mg Q EVENING JUSTIN ReynoldsO.   40 mg at 07/22/20 1640   • morphine (pf) 4 MG/ML injection 2-4 mg  2-4 mg Q5 MIN PRN JUSTIN ReynoldsO.   2 mg at 07/22/20 1201   • nitroglycerin (NITROSTAT) tablet 0.4 mg  0.4 mg Q5 MIN PRN JUSTIN ReynoldsO.   0.4 mg at 07/22/20 1147   • ondansetron (ZOFRAN) syringe/vial injection 4 mg  4 mg Q4HRS PRN Donnie Eisenberg D.O.       • ondansetron (ZOFRAN ODT) dispertab 4 mg  4 mg Q4HRS PRN Donnie Eisenberg D.O.       • promethazine (PHENERGAN) tablet 12.5-25 mg  12.5-25 mg Q4HRS PRN Donnie Eisenberg D.O.       • promethazine (PHENERGAN) suppository 12.5-25 mg  12.5-25 mg Q4HRS PRN JUSTIN ReynoldsO.       • prochlorperazine (COMPAZINE) injection 5-10 mg  5-10 mg Q4HRS PRN Donnie Eisenberg D.O.       • insulin regular (HumuLIN R,NovoLIN R) injection  1-6 Units 4X/DAY ACHS Donnie Eisenberg D.O.   Stopped at 07/22/20 1100    And   • glucose 4 g chewable tablet 16 g  16 g Q15 MIN PRN Donnie Eisenberg D.O.        And   • dextrose 50% (D50W) injection 50 mL  50 mL Q15 MIN PRN Donnie Eisenberg D.O.       • heparin injection 3,200 Units  3,200 Units PRN Donnie Eisenberg D.O.        And   • heparin infusion 25,000 units in 500 mL 0.45% NACL   Continuous  "Donnie Eisenberg D.O. 19 mL/hr at 07/23/20 0650 950 Units/hr at 07/23/20 0650   • hydrALAZINE (APRESOLINE) injection 20 mg  20 mg Q6HRS PRN Jenny Srinivasan D.O.       • NS infusion   Continuous Jenny Srinivasan D.O. 100 mL/hr at 07/23/20 0709     • ipratropium-albuterol (DUONEB) nebulizer solution  3 mL Q4H PRN (RT) JUSTIN SampsonOSea   3 mL at 07/22/20 1646   • metoprolol SR (TOPROL XL) tablet 100 mg  100 mg BID [facility] To, M.D.   100 mg at 07/23/20 0437   • clopidogrel (PLAVIX) tablet 75 mg  75 mg DAILY [facility] To, M.D.   75 mg at 07/23/20 0437   • losartan (COZAAR) tablet 25 mg  25 mg Q DAY [facility] To, M.D.   25 mg at 07/23/20 0437   • spironolactone (ALDACTONE) tablet 12.5 mg  12.5 mg Q DAY [facility] To, M.D.   12.5 mg at 07/23/20 0437   • oxyCODONE immediate-release (ROXICODONE) tablet 5 mg  5 mg Q4HRS PRN Sukhjinder Saldana, A.P.N.   5 mg at 07/22/20 2147    Or   • oxyCODONE immediate release (ROXICODONE) tablet 10 mg  10 mg Q4HRS PRN Sukhjinder Saldana, A.P.N.       • diazePAM (VALIUM) tablet 2-5 mg  2-5 mg Q6HRS PRN Sukhjinder Saldana, A.P.N.       • isosorbide dinitrate (ISORDIL) tablet 5 mg  5 mg BID PRN Sukhjinder Saldana, A.P.N.       Last reviewed on 7/22/2020  2:16 AM by Stanislaw Garrido M.D.    No Known Allergies    Physical Exam  Body mass index is 33.68 kg/m². /90   Pulse 63   Temp 36 °C (96.8 °F) (Temporal)   Resp 16   Ht 1.626 m (5' 4\")   Wt 89 kg (196 lb 3.4 oz)   SpO2 96%    Vitals:    07/22/20 2041 07/22/20 2058 07/23/20 0040 07/23/20 0435   BP:  111/78 130/89 129/90   Pulse: 60 (!) 53 65 63   Resp: 16 16 16 16   Temp:  36 °C (96.8 °F) 35.9 °C (96.7 °F) 36 °C (96.8 °F)   TempSrc:  Temporal Temporal Temporal   SpO2: 94% 93% 95% 96%   Weight:       Height:        Oxygen Therapy:  Pulse Oximetry: 96 %, O2 (LPM): 0, FiO2%: 21 %, O2 Delivery Device: None - Room Air    General: no apparent distress.  Eyes: normal conjunctiva  ENT: OP clear  Neck: no JVD, thick.   Lungs: normal " respiratory effort,  without crackles, no wheezing or rhonchi.  Heart: normal rate,  regular rhythm, no murmur, no rubs or gallops,   EXT: no edema bilateral lower extremities. + bilateral pedal pulses. no cyanosis  Abdomen: soft, non tender, non distended,  Neurological: No focal deficits, no facial asymmetry.  Normal speech.  Psychiatric: Appropriate affect, alert and oriented x 3.   Skin: Warm extremities, no rash.    Labs (personally reviewed):     Lab Results   Component Value Date/Time    SODIUM 136 07/23/2020 04:32 AM    POTASSIUM 4.4 07/23/2020 04:32 AM    CHLORIDE 104 07/23/2020 04:32 AM    CO2 17 (L) 07/23/2020 04:32 AM    GLUCOSE 143 (H) 07/23/2020 04:32 AM    BUN 32 (H) 07/23/2020 04:32 AM    CREATININE 1.57 (H) 07/23/2020 04:32 AM     Lab Results   Component Value Date/Time    ALKPHOSPHAT 50 07/22/2020 12:34 AM    ASTSGOT 21 07/22/2020 12:34 AM    ALTSGPT 17 07/22/2020 12:34 AM    TBILIRUBIN 0.4 07/22/2020 12:34 AM      Lab Results   Component Value Date/Time    CHOLSTRLTOT 164 07/23/2020 04:32 AM     (H) 07/23/2020 04:32 AM    HDL 34 (A) 07/23/2020 04:32 AM    TRIGLYCERIDE 116 07/23/2020 04:32 AM     Lab Results   Component Value Date/Time    BNPBTYPENAT 189 (H) 12/07/2018 10:11 PM         Cardiac Imaging and Procedures Review:      Personal Telemetry Review:    Personal EKG Interpretation 7/22/20 0919:  NS kelly HR 56, TWI in the lateral leads are chronic.  There is some J  Point elevate in leads V1-V3 - does not meet stemi criteria.    Echo 7/22/20:  CONCLUSIONS  Severely reduced left ventricular systolic function. Left ventricular   ejection fraction is visually estimated to be 20%.   Indeterminate diastolic function.  Normal inferior vena cava size without inspiratory collapse.    [facility] 11/1/2018:  POSTOPERATIVE DIAGNOSIS:  1.  100% occluded proximal dominant left circumflex coronary artery, thrombotic  2.  Patent mid and distal LAD stents with diffuse nonobstructive CAD throughout  3.  60%  tandem RCA stenoses with diffuse distal small vessel CAD  4.  LVEF 50-55% preintervention  5.  Successful PCI LCx (3.0 x 33 mm Synergy BELINDA posted to 3.25 mm), excellent result DANAE-3 flow      Assessment and Medical Decision Makin   NSTEMI.  Troponin trending up from 113 to 667 yesterday.   EKG with chronic lateral ST changes.   Echo with LVEF 20% (also chronically reduced)    -   Continue heparin ggt at least x 48 hours.  -   Continue 81 mg aspirin and plavix 75 mg.  -   Continue BB and high intensity statin.  -   Coronary angio and PCI has been deferred due to pt's inability to guarantee compliance with DAPT.    2   ICMO without overt heart failure.  EF 20% (chronic)  -   Continue GDMT (which was recently restarted)  -   Losartan 25, Toprol , spironolactone 12.5.    3   Acute kidney injury.  -   Cr improved slightly today.    4   Essential hypertension.  -  BP stable on current med regimen outlined above.    5   Diabetes mellitus type II, A1C 6.6    6   Sinus bradycardia.      Will discuss case with Dr. Stallworth, see any additional recommendations in attestation above.      Mary Mendez PA-C  [facility]

## 2020-07-23 NOTE — PROGRESS NOTES
Bedside report received. Pt in bed. Pt alert and oriented x4. Call light within reach. Bed in low and locked position.

## 2020-07-23 NOTE — PROGRESS NOTES
Received report from day shift RN, Kayleen. Pt is resting in bed and does not appear to be in any distress. Call light and personal belongings within reach, bed in lowest locked position. POC addressed, white board updated. No further questions at this time.

## 2020-07-23 NOTE — PROGRESS NOTES
ACS Navigator Consult Note    NSTEMI diagnosis.     Management: GDMT- patient unable to guarantee compliance to DAPT, angio and PCU deferred    Current assessment of LV Function shows: 20%    Reviewed ACS medications:  · DAPT: aspirin + plavix Please note: for ACS patients who are treated medically without PCI and stenting, DAPT has been demonstrated to reduce recurrent CV events. Source: 2013 ACCF/AHA Guideline for the management of STEMI  · Beta-Blocker:  metoprolol 100mg   · Statin:  atorvastatin 80mg (needs to be a high intensity statin unless contraindicated: Atorvastatin 80mg or 40mg, or Rosuvastatin 40mg or 20mg  · Consider for aldosterone blockade?  spironolactone 25mg -- EF is 20%  · Consider for ACE-I/ARB/ARNI?  Losartan 50mg -- EF is 20%    Meds to Beds BEDSIDE NURSING RESPONSIBILITIES:    If not already done, please assess patient for Meds to Beds Opt-In which can be found in the admit navigator (see below). Evidence shows that meds to beds improves medication compliance and patient outcomes.          Intensive Cardiac Rehab (ICR) Referral  Referred on 7/22; has current inpatient orders for nutrition consult & PT for Phase I ICR    Smoking Cessation?  Indicated? yes  Documented yes    Demographics  Patient resides in: [place]  Insurance: [facility]    Inpatient & Discharge Patient Education  Bedside nursing to continually provide patient education on ACS meds, signs and symptoms to monitor for, and risk factor modification.     Also at discharge please complete the “Acute Coronary Syndrome” special instructions on the AVS:          Follow up  Cardiology and Cardiac Rehab    Thank you, Yuly Lacey RN BSN ext. [phone], or available via tiger text

## 2020-07-23 NOTE — PROGRESS NOTES
Monitor Summary    SB-SR 46-62   ST elevation  0800 4 beats Vtach  (R) PVC  1400 HR < 38 junctional    .10/.08/.42

## 2020-07-24 VITALS
OXYGEN SATURATION: 93 % | WEIGHT: 200.4 LBS | BODY MASS INDEX: 34.21 KG/M2 | TEMPERATURE: 97 F | HEART RATE: 67 BPM | DIASTOLIC BLOOD PRESSURE: 93 MMHG | HEIGHT: 64 IN | RESPIRATION RATE: 16 BRPM | SYSTOLIC BLOOD PRESSURE: 131 MMHG

## 2020-07-24 PROBLEM — I24.9 ACS (ACUTE CORONARY SYNDROME) (HCC): Status: RESOLVED | Noted: 2020-07-22 | Resolved: 2020-07-24

## 2020-07-24 PROBLEM — D75.1 POLYCYTHEMIA: Status: RESOLVED | Noted: 2020-07-22 | Resolved: 2020-07-24

## 2020-07-24 LAB
ALBUMIN SERPL BCP-MCNC: 4 G/DL (ref 3.2–4.9)
APTT PPP: 58.5 SEC (ref 24.7–36)
BUN SERPL-MCNC: 26 MG/DL (ref 8–22)
CALCIUM SERPL-MCNC: 8.8 MG/DL (ref 8.5–10.5)
CHLORIDE SERPL-SCNC: 103 MMOL/L (ref 96–112)
CO2 SERPL-SCNC: 20 MMOL/L (ref 20–33)
CREAT SERPL-MCNC: 1.21 MG/DL (ref 0.5–1.4)
ERYTHROCYTE [DISTWIDTH] IN BLOOD BY AUTOMATED COUNT: 41.3 FL (ref 35.9–50)
GLUCOSE BLD-MCNC: 123 MG/DL (ref 65–99)
GLUCOSE SERPL-MCNC: 133 MG/DL (ref 65–99)
HCT VFR BLD AUTO: 47.2 % (ref 42–52)
HGB BLD-MCNC: 15.1 G/DL (ref 14–18)
MCH RBC QN AUTO: 28.3 PG (ref 27–33)
MCHC RBC AUTO-ENTMCNC: 32 G/DL (ref 33.7–35.3)
MCV RBC AUTO: 88.4 FL (ref 81.4–97.8)
PHOSPHATE SERPL-MCNC: 2.3 MG/DL (ref 2.5–4.5)
PLATELET # BLD AUTO: 295 K/UL (ref 164–446)
PMV BLD AUTO: 10.1 FL (ref 9–12.9)
POTASSIUM SERPL-SCNC: 4 MMOL/L (ref 3.6–5.5)
RBC # BLD AUTO: 5.34 M/UL (ref 4.7–6.1)
SODIUM SERPL-SCNC: 138 MMOL/L (ref 135–145)
WBC # BLD AUTO: 11.9 K/UL (ref 4.8–10.8)

## 2020-07-24 PROCEDURE — 700102 HCHG RX REV CODE 250 W/ 637 OVERRIDE(OP): Performed by: INTERNAL MEDICINE

## 2020-07-24 PROCEDURE — 36415 COLL VENOUS BLD VENIPUNCTURE: CPT

## 2020-07-24 PROCEDURE — A9270 NON-COVERED ITEM OR SERVICE: HCPCS | Performed by: INTERNAL MEDICINE

## 2020-07-24 PROCEDURE — 82962 GLUCOSE BLOOD TEST: CPT

## 2020-07-24 PROCEDURE — 99232 SBSQ HOSP IP/OBS MODERATE 35: CPT | Performed by: INTERNAL MEDICINE

## 2020-07-24 PROCEDURE — 85027 COMPLETE CBC AUTOMATED: CPT

## 2020-07-24 PROCEDURE — 99239 HOSP IP/OBS DSCHRG MGMT >30: CPT | Performed by: INTERNAL MEDICINE

## 2020-07-24 PROCEDURE — 85730 THROMBOPLASTIN TIME PARTIAL: CPT

## 2020-07-24 PROCEDURE — 80069 RENAL FUNCTION PANEL: CPT

## 2020-07-24 RX ORDER — ISOSORBIDE DINITRATE 5 MG/1
5 TABLET ORAL 2 TIMES DAILY PRN
Qty: 30 TAB | Refills: 0 | Status: SHIPPED | OUTPATIENT
Start: 2020-07-24 | End: 2020-07-24

## 2020-07-24 RX ORDER — ATORVASTATIN CALCIUM 40 MG/1
40 TABLET, FILM COATED ORAL EVERY EVENING
Qty: 30 TAB | Refills: 0 | Status: SHIPPED | OUTPATIENT
Start: 2020-07-24 | End: 2020-07-24

## 2020-07-24 RX ORDER — LOSARTAN POTASSIUM 50 MG/1
100 TABLET ORAL
Status: DISCONTINUED | OUTPATIENT
Start: 2020-07-25 | End: 2020-07-24 | Stop reason: HOSPADM

## 2020-07-24 RX ORDER — LOSARTAN POTASSIUM 50 MG/1
50 TABLET ORAL DAILY
Qty: 30 TAB | Refills: 0 | Status: SHIPPED | OUTPATIENT
Start: 2020-07-25 | End: 2020-08-06

## 2020-07-24 RX ORDER — CLOPIDOGREL BISULFATE 75 MG/1
75 TABLET ORAL DAILY
Qty: 30 TAB | Refills: 0 | Status: SHIPPED | OUTPATIENT
Start: 2020-07-25 | End: 2020-07-24

## 2020-07-24 RX ORDER — CLOPIDOGREL BISULFATE 75 MG/1
75 TABLET ORAL DAILY
Qty: 30 TAB | Refills: 0 | Status: SHIPPED | OUTPATIENT
Start: 2020-07-25 | End: 2020-09-01 | Stop reason: SDUPTHER

## 2020-07-24 RX ORDER — SPIRONOLACTONE 25 MG/1
25 TABLET ORAL DAILY
Qty: 30 TAB | Refills: 0 | Status: SHIPPED | OUTPATIENT
Start: 2020-07-25 | End: 2020-09-01 | Stop reason: SDUPTHER

## 2020-07-24 RX ORDER — LOSARTAN POTASSIUM 50 MG/1
50 TABLET ORAL DAILY
Qty: 30 TAB | Refills: 0 | Status: SHIPPED | OUTPATIENT
Start: 2020-07-25 | End: 2020-07-24

## 2020-07-24 RX ORDER — ATORVASTATIN CALCIUM 40 MG/1
40 TABLET, FILM COATED ORAL EVERY EVENING
Qty: 30 TAB | Refills: 0 | Status: SHIPPED | OUTPATIENT
Start: 2020-07-24 | End: 2020-09-01 | Stop reason: SDUPTHER

## 2020-07-24 RX ORDER — ISOSORBIDE DINITRATE 10 MG/1
5 TABLET ORAL 2 TIMES DAILY PRN
Qty: 15 TAB | Refills: 0 | OUTPATIENT
Start: 2020-07-24

## 2020-07-24 RX ORDER — METOPROLOL SUCCINATE 100 MG/1
100 TABLET, EXTENDED RELEASE ORAL 2 TIMES DAILY
Qty: 30 TAB | Refills: 0 | Status: SHIPPED | OUTPATIENT
Start: 2020-07-24 | End: 2020-07-24

## 2020-07-24 RX ORDER — SPIRONOLACTONE 25 MG/1
25 TABLET ORAL DAILY
Qty: 30 TAB | Refills: 0 | Status: SHIPPED | OUTPATIENT
Start: 2020-07-25 | End: 2020-07-24

## 2020-07-24 RX ORDER — METOPROLOL SUCCINATE 100 MG/1
100 TABLET, EXTENDED RELEASE ORAL 2 TIMES DAILY
Qty: 30 TAB | Refills: 0 | Status: SHIPPED | OUTPATIENT
Start: 2020-07-24

## 2020-07-24 RX ORDER — ASPIRIN 81 MG/1
81 TABLET ORAL DAILY
Qty: 30 TAB | Refills: 0 | Status: SHIPPED | OUTPATIENT
Start: 2020-07-25 | End: 2020-09-08 | Stop reason: SDUPTHER

## 2020-07-24 RX ORDER — ASPIRIN 81 MG/1
81 TABLET ORAL DAILY
Qty: 30 TAB | Refills: 0 | Status: SHIPPED | OUTPATIENT
Start: 2020-07-25 | End: 2020-07-24

## 2020-07-24 RX ADMIN — CLOPIDOGREL BISULFATE 75 MG: 75 TABLET ORAL at 04:46

## 2020-07-24 RX ADMIN — METOPROLOL SUCCINATE 100 MG: 50 TABLET, EXTENDED RELEASE ORAL at 04:46

## 2020-07-24 RX ADMIN — ASPIRIN 325 MG: 325 TABLET, FILM COATED ORAL at 04:47

## 2020-07-24 RX ADMIN — LOSARTAN POTASSIUM 50 MG: 50 TABLET, FILM COATED ORAL at 04:46

## 2020-07-24 RX ADMIN — SPIRONOLACTONE 25 MG: 25 TABLET ORAL at 04:46

## 2020-07-24 NOTE — DISCHARGE SUMMARY
Discharge Summary    CHIEF COMPLAINT ON ADMISSION  Chief Complaint   Patient presents with   • Shortness of Breath   • Chest Pressure       Reason for Admission  SOB     Admission Date  7/22/2020    CODE STATUS  Full Code    HPI & HOSPITAL COURSE  This is a 35 y.o. male here with chest pain and dyspnea.     35 y.o male with past medical history of diabetes, hypertension, hyperlipidemia, CAD s/p 2 BELINDA, gout, ischemic cardiomyopathy and medical noncompliance who presented for chest pain and shortness of breath. Initial EKG showed ST elevation determined to be J-point elevation by cardiology. Patient had been off cardiac medications for 2 months. Troponins elevated, no ST elevations on EKG. Due to patient's noncompliance and his inability to get medications patient agreed not to proceed with invasive cardiac intervention. Patient also presenting with ARF baseline Cr 1.04, renal US negative and Cr mildly improved with fluids.        Patient has finished 48 hours of heparin drip for NSTEMI and has been cleared by cardiology for discharge.Patient states that his chest pain and shortness of breath have resolved. Patient's ARF continues to improve and patient will need to follow up with his primary care physician for monitoring of his kidney function. Polycythemia resolved on today's labs, patient has been educated on importance of smoking cessation. Also discussed importance of medication compliance at length and patient stated understanding, medications delivered to bedside prior to discharge.     Therefore, he is discharged in fair and stable condition to home with close outpatient follow-up.    The patient met 2-midnight criteria for an inpatient stay at the time of discharge.    Discharge Date  7/24/2020    FOLLOW UP ITEMS POST DISCHARGE  Follow up with cardiology for continued heart failure management.  Follow up with primary care physician and repeat labs to monitor renal function and polycythemia.     DISCHARGE  DIAGNOSES  Principal Problem (Resolved):    ACS (acute coronary syndrome) (HCC) POA: Yes  Active Problems:    Ischemic cardiomyopathy POA: Yes    Asthma POA: Yes    Leukocytosis POA: Yes    ARF (acute renal failure) (HCC) POA: Yes    Essential hypertension POA: Yes    Type 2 diabetes mellitus with hyperglycemia (HCC) POA: Yes    Gout of multiple sites POA: Yes  Resolved Problems:    Polycythemia POA: Yes      FOLLOW UP  Future Appointments   Date Time Provider Department Center   7/28/2020  3:20 PM Song Camarillo M.D. [facility] None   8/6/2020  9:45 AM Jose Stallworth M.D. [facility] None     Cardiac Rehab      Your doctor has referred you to Cardiac Rehab, which is important to your recovery. Please call to make an appointment, or speak to your local doctor to find a program in your area.    Song Camarillo M.D.  [address]  [address]  [place] [address]  [phone]    In 4 days  to follow up on kidney function and blood counts    Jose Stallworth M.D.  [address]  [address]  [place] [address]  [phone]    In 2 weeks  for heart failure management       MEDICATIONS ON DISCHARGE     Medication List      START taking these medications      Instructions   aspirin 81 MG EC tablet  Start taking on:  July 25, 2020   Take 1 Tab by mouth every day.  Dose:  81 mg     clopidogrel 75 MG Tabs  Start taking on:  July 25, 2020  Commonly known as:  PLAVIX   Take 1 Tab by mouth every day.  Dose:  75 mg     isosorbide dinitrate 5 MG Tabs  Commonly known as:  ISORDIL   Take 1 Tab by mouth 2 times a day as needed (chest pain).  Dose:  5 mg     losartan 50 MG Tabs  Start taking on:  July 25, 2020  Commonly known as:  COZAAR   Take 1 Tab by mouth every day.  Dose:  50 mg     spironolactone 25 MG Tabs  Start taking on:  July 25, 2020  Commonly known as:  ALDACTONE   Take 1 Tab by mouth every day.  Dose:  25 mg        CHANGE how you take these medications      Instructions   atorvastatin 40 MG Tabs  What  changed:    · medication strength  · how much to take  · when to take this  Commonly known as:  LIPITOR   Take 1 Tab by mouth every evening.  Dose:  40 mg     metoprolol  MG Tb24  What changed:  when to take this  Commonly known as:  TOPROL XL   Take 1 Tab by mouth 2 Times a Day.  Dose:  100 mg        CONTINUE taking these medications      Instructions   allopurinol 100 MG Tabs  Commonly known as:  ZYLOPRIM   Take 1 Tab by mouth every day.  Dose:  100 mg     indomethacin SR 75 MG Cpcr  Commonly known as:  INDOCIN SR   Take 1 Cap by mouth 2 times daily with meals as needed.  Dose:  75 mg     metFORMIN 500 MG Tabs  Commonly known as:  GLUCOPHAGE   Take 1 Tab by mouth 2 Times a Day for 30 days.  Dose:  500 mg        STOP taking these medications    lisinopril-hydrochlorothiazide 20-12.5 MG per tablet  Commonly known as:  PRINZIDE            Allergies  No Known Allergies    DIET  Orders Placed This Encounter   Procedures   • Diet Order Cardiac     Standing Status:   Standing     Number of Occurrences:   1     Order Specific Question:   Diet:     Answer:   Cardiac [6]     Order Specific Question:   Electrolyte modifications:     Answer:   1.5 g Sodium [1]       ACTIVITY  As tolerated.  Exercise encouraged.  Weight bearing as tolerated    CONSULTATIONS  Cardiology     PROCEDURES  N/A    LABORATORY  Lab Results   Component Value Date    SODIUM 138 07/24/2020    POTASSIUM 4.0 07/24/2020    CHLORIDE 103 07/24/2020    CO2 20 07/24/2020    GLUCOSE 133 (H) 07/24/2020    BUN 26 (H) 07/24/2020    CREATININE 1.21 07/24/2020        Lab Results   Component Value Date    WBC 11.9 (H) 07/24/2020    HEMOGLOBIN 15.1 07/24/2020    HEMATOCRIT 47.2 07/24/2020    PLATELETCT 295 07/24/2020        Total time of the discharge process exceeds 35 minutes.

## 2020-07-24 NOTE — DISCHARGE INSTRUCTIONS
Discharge Instructions    Discharged to home by car with relative. Discharged via wheelchair, hospital escort: Refused.  Special equipment needed: Not Applicable    Be sure to schedule a follow-up appointment with your primary care doctor or any specialists as instructed.     Discharge Plan:   Smoking Cessation Offered: Patient Counseled    I understand that a diet low in cholesterol, fat, and sodium is recommended for good health. Unless I have been given specific instructions below for another diet, I accept this instruction as my diet prescription.   Other diet: diabetic low sodium    Special Instructions: Diagnosis:  Acute Coronary Syndrome (ACS) is a diagnosis that encompasses cardiac-related chest pain and heart attack. ACS occurs when the blood flow to the heart muscle is severely reduced or cut off completely due to a slow process called atherosclerosis.  Atherosclerosis is a disease in which the coronary arteries become narrow from a buildup of fat, cholesterol, and other substances that combine to form plaque. If the plaque breaks, a blood clot will form and block the blood flow to the heart muscle. This lack of blood flow can cause damage or death to the heart muscle which is called a heart attack or Myocardial Infarction (MI). There are two different types of MIs:  ST Elevation Myocardial Infarction or STEMI (the most severe type of heart attack) and Non-ST Elevation Myocardial Infarction or NSTEMI.    Treatment Plan:  · Cardiac Diet  - Low fat, low salt, low cholesterol   · Cardiac Rehab  - Your doctor has ordered you a referral to [facility].  Call [phone] to schedule an appointment.  · Attend my follow-up appointment with my Cardiologist.  · Take my medications as prescribed by my doctor  · Exercise daily  · Lower my bad cholesterol and raise my good cholesterol, lower my blood pressure, Reduce stress and Control my diabetes    Medications:  Certain medications are used to treat ACS.  Remember to  always take medications as prescribed and never stop talking medications unless told by your doctor.    You have been prescribed the following medicatons:    Aspirin - Aspirin is used as a blood thinning medication and you will require this medication indefinitely.  Anti-platelet/blood thinner - Your Anti-platelet/Blood thinning medication is called plavix, and is used in combination with aspirin to prevent clots from forming in your heart and/or around your stent.  Your doctor will determine how long you need to be on this medicine.  Beta-Blocker - Beta-Blocker metoprolol is used to lower blood pressure and heart rate, and/or helps your heart heal after a heart attack.  Statin - Statin lipitor is used to lower cholesterol.  Angiotensin Receptor Blocker (ARB) - Angiotensin Receptor Blocker losartan is used to lower blood pressure and treat heart failure.  Nitroglycerine - Nitroglycerine is used to relieve chest pain.    · Is patient discharged on Warfarin / Coumadin?   No   Spironolactone tablets  What is this medicine?  SPIRONOLACTONE (debra on oh LAK tone) is a diuretic. It helps you make more urine and to lose excess water from your body. This medicine is used to treat high blood pressure, and edema or swelling from heart, kidney, or liver disease. It is also used to treat patients who make too much aldosterone or have low potassium.  This medicine may be used for other purposes; ask your health care provider or pharmacist if you have questions.  COMMON BRAND NAME(S): Aldactone  What should I tell my health care provider before I take this medicine?  They need to know if you have any of these conditions:  · high blood level of potassium  · kidney disease or trouble making urine  · liver disease  · an unusual or allergic reaction to spironolactone, other medicines, foods, dyes, or preservatives  · pregnant or trying to get pregnant  · breast-feeding  How should I use this medicine?  Take this medicine by mouth  with a drink of water. Follow the directions on your prescription label. You can take it with or without food. If it upsets your stomach, take it with food. Do not take your medicine more often than directed. Remember that you will need to pass more urine after taking this medicine. Do not take your doses at a time of day that will cause you problems. Do not take at bedtime.  Talk to your pediatrician regarding the use of this medicine in children. While this drug may be prescribed for selected conditions, precautions do apply.  Overdosage: If you think you have taken too much of this medicine contact a poison control center or emergency room at once.  NOTE: This medicine is only for you. Do not share this medicine with others.  What if I miss a dose?  If you miss a dose, take it as soon as you can. If it is almost time for your next dose, take only that dose. Do not take double or extra doses.  What may interact with this medicine?  Do not take this medicine with any of the following medications:  · cidofovir  · eplerenone  · tranylcypromine  This medicine may also interact with the following medications:  · aspirin  · certain medicines for blood pressure or heart disease like benazepril, lisinopril, losartan, valsartan  · certain medicines that treat or prevent blood clots like heparin and enoxaparin  · cholestyramine  · cyclosporine  · digoxin  · lithium  · medicines that relax muscles for surgery  · NSAIDs, medicines for pain and inflammation, like ibuprofen or naproxen  · other diuretics  · potassium supplements  · steroid medicines like prednisone or cortisone  · trimethoprim  This list may not describe all possible interactions. Give your health care provider a list of all the medicines, herbs, non-prescription drugs, or dietary supplements you use. Also tell them if you smoke, drink alcohol, or use illegal drugs. Some items may interact with your medicine.  What should I watch for while using this  medicine?  Visit your doctor or health care professional for regular checks on your progress. Check your blood pressure as directed. Ask your doctor what your blood pressure should be, and when you should contact them.  You may need to be on a special diet while taking this medicine. Ask your doctor. Also, ask how many glasses of fluid you need to drink a day. You must not get dehydrated.  This medicine may make you feel confused, dizzy or lightheaded. Drinking alcohol and taking some medicines can make this worse. Do not drive, use machinery, or do anything that needs mental alertness until you know how this medicine affects you. Do not sit or stand up quickly.  What side effects may I notice from receiving this medicine?  Side effects that you should report to your doctor or health care professional as soon as possible:  · allergic reactions such as skin rash or itching, hives, swelling of the lips, mouth, tongue, or throat  · black or tarry stools  · fast, irregular heartbeat  · fever  · muscle pain, cramps  · numbness, tingling in hands or feet  · trouble breathing  · trouble passing urine  · unusual bleeding  · unusually weak or tired  Side effects that usually do not require medical attention (report to your doctor or health care professional if they continue or are bothersome):  · change in voice or hair growth  · confusion  · dizzy, drowsy  · dry mouth, increased thirst  · enlarged or tender breasts  · headache  · irregular menstrual periods  · sexual difficulty, unable to have an erection  · stomach upset  This list may not describe all possible side effects. Call your doctor for medical advice about side effects. You may report side effects to FDA at 5-593-WNZ-9794.  Where should I keep my medicine?  Keep out of the reach of children.  Store below 25 degrees C (77 degrees F). Throw away any unused medicine after the expiration date.  NOTE: This sheet is a summary. It may not cover all possible information.  If you have questions about this medicine, talk to your doctor, pharmacist, or health care provider.  © 2020 [employer] Standard (2018-05-04 09:42:28)  Losartan tablets  What is this medicine?  LOSARTAN (rafa BUTLER tan) is used to treat high blood pressure and to reduce the risk of stroke in certain patients. This drug also slows the progression of kidney disease in patients with diabetes.  This medicine may be used for other purposes; ask your health care provider or pharmacist if you have questions.  COMMON BRAND NAME(S): Cozaar  What should I tell my health care provider before I take this medicine?  They need to know if you have any of these conditions:  · heart failure  · kidney or liver disease  · an unusual or allergic reaction to losartan, other medicines, foods, dyes, or preservatives  · pregnant or trying to get pregnant  · breast-feeding  How should I use this medicine?  Take this medicine by mouth with a glass of water. Follow the directions on the prescription label. This medicine can be taken with or without food. Take your doses at regular intervals. Do not take your medicine more often than directed.  Talk to your pediatrician regarding the use of this medicine in children. Special care may be needed.  Overdosage: If you think you have taken too much of this medicine contact a poison control center or emergency room at once.  NOTE: This medicine is only for you. Do not share this medicine with others.  What if I miss a dose?  If you miss a dose, take it as soon as you can. If it is almost time for your next dose, take only that dose. Do not take double or extra doses.  What may interact with this medicine?  · blood pressure medicines  · diuretics, especially triamterene, spironolactone, or amiloride  · fluconazole  · NSAIDs, medicines for pain and inflammation, like ibuprofen or naproxen  · potassium salts or potassium supplements  · rifampin  This list may not describe all possible interactions.  Give your health care provider a list of all the medicines, herbs, non-prescription drugs, or dietary supplements you use. Also tell them if you smoke, drink alcohol, or use illegal drugs. Some items may interact with your medicine.  What should I watch for while using this medicine?  Visit your doctor or health care professional for regular checks on your progress. Check your blood pressure as directed. Ask your doctor or health care professional what your blood pressure should be and when you should contact him or her. Call your doctor or health care professional if you notice an irregular or fast heart beat.  Women should inform their doctor if they wish to become pregnant or think they might be pregnant. There is a potential for serious side effects to an unborn child, particularly in the second or third trimester. Talk to your health care professional or pharmacist for more information.  You may get drowsy or dizzy. Do not drive, use machinery, or do anything that needs mental alertness until you know how this drug affects you. Do not stand or sit up quickly, especially if you are an older patient. This reduces the risk of dizzy or fainting spells. Alcohol can make you more drowsy and dizzy. Avoid alcoholic drinks.  Avoid salt substitutes unless you are told otherwise by your doctor or health care professional.  Do not treat yourself for coughs, colds, or pain while you are taking this medicine without asking your doctor or health care professional for advice. Some ingredients may increase your blood pressure.  What side effects may I notice from receiving this medicine?  Side effects that you should report to your doctor or health care professional as soon as possible:  · confusion, dizziness, light headedness or fainting spells  · decreased amount of urine passed  · difficulty breathing or swallowing, hoarseness, or tightening of the throat  · fast or irregular heart beat, palpitations, or chest pain  · skin  rash, itching  · swelling of your face, lips, tongue, hands, or feet  Side effects that usually do not require medical attention (report to your doctor or health care professional if they continue or are bothersome):  · cough  · decreased sexual function or desire  · headache  · nasal congestion or stuffiness  · nausea or stomach pain  · sore or cramping muscles  This list may not describe all possible side effects. Call your doctor for medical advice about side effects. You may report side effects to FDA at 5-123-ZFI-0395.  Where should I keep my medicine?  Keep out of the reach of children.  Store at room temperature between 15 and 30 degrees C (59 and 86 degrees F). Protect from light. Keep container tightly closed. Throw away any unused medicine after the expiration date.  NOTE: This sheet is a summary. It may not cover all possible information. If you have questions about this medicine, talk to your doctor, pharmacist, or health care provider.  © 2020 [employer] Standard (2009-02-27 16:42:18)  Isosorbide Dinitrate, ISDN tablets  What is this medicine?  ISOSORBIDE DINITRATE (eye tavo SOR bide dye THELMA trate) is a type of vasodilator. It relaxes blood vessels, increasing the blood and oxygen supply to your heart. This medicine is used to prevent chest pain caused by angina. It will not help to stop an episode of chest pain.  This medicine may be used for other purposes; ask your health care provider or pharmacist if you have questions.  COMMON BRAND NAME(S): Isordil Titradose, Sorbitrate, Wesorbide  What should I tell my health care provider before I take this medicine?  They need to know if you have any of these conditions:  · previous heart attack or heart failure  · an unusual or allergic reaction to isosorbide dinitrate, nitrates, other medicines, foods, dyes, or preservatives  · pregnant or trying to get pregnant  · breast-feeding  How should I use this medicine?  Take this medicine by mouth with a glass of  water. Follow the directions on the prescription label. Take this medicine on an empty stomach, at least 30 minutes before or 2 hours after food. Do not take with food. Take your medicine at regular intervals. Do not take your medicine more often than directed. Do not stop taking this medicine suddenly or your symptoms may get worse. Ask your doctor or health care professional how to gradually reduce the dose.  Talk to your pediatrician regarding the use of this medicine in children. Special care may be needed.  Overdosage: If you think you have taken too much of this medicine contact a poison control center or emergency room at once.  NOTE: This medicine is only for you. Do not share this medicine with others.  What if I miss a dose?  If you miss a dose, take it as soon as you can. If it is almost time for your next dose, take only that dose. Do not take double or extra doses.  What may interact with this medicine?  Do not take this medicine with any of the following medications:  · medicines used to treat erectile dysfunction (ED) like avanafil, sildenafil, tadalafil, and vardenafil  · riociguat  This medicine may also interact with the following medications:  · medicines for high blood pressure  · other medicines for angina or heart failure  This list may not describe all possible interactions. Give your health care provider a list of all the medicines, herbs, non-prescription drugs, or dietary supplements you use. Also tell them if you smoke, drink alcohol, or use illegal drugs. Some items may interact with your medicine.  What should I watch for while using this medicine?  Check your heart rate and blood pressure regularly while you are taking this medicine. Ask your doctor or health care professional what your heart rate and blood pressure should be and when you should contact him or her. Tell your doctor or health care professional if you feel your medicine is no longer working.  You may get dizzy. Do not  drive, use machinery, or do anything that needs mental alertness until you know how this medicine affects you. To reduce the risk of dizzy or fainting spells, do not sit or stand up quickly, especially if you are an older patient. Alcohol can make you more dizzy, and increase flushing and rapid heartbeats. Avoid alcoholic drinks.  Do not treat yourself for coughs, colds, or pain while you are taking this medicine without asking your doctor or health care professional for advice. Some ingredients may increase your blood pressure.  What side effects may I notice from receiving this medicine?  Side effects that you should report to your doctor or health care professional as soon as possible:  · bluish discoloration of lips, fingernails, or palms of hands  · irregular heartbeat, palpitations  · low blood pressure  · nausea, vomiting  · persistent headache  · unusually weak or tired  Side effects that usually do not require medical attention (report to your doctor or health care professional if they continue or are bothersome):  · flushing of the face or neck  · rash  This list may not describe all possible side effects. Call your doctor for medical advice about side effects. You may report side effects to FDA at 0-115-FDA-5743.  Where should I keep my medicine?  Keep out of the reach of children.  Store at room temperature, approximately 25 degrees C (77 degrees F). Protect from light. Keep container tightly closed. Throw away any unused medicine after the expiration date.  NOTE: This sheet is a summary. It may not cover all possible information. If you have questions about this medicine, talk to your doctor, pharmacist, or health care provider.  © 2020 [employer]/Gold Standard (2014-10-17 14:47:34)  Clopidogrel tablets  What is this medicine?  CLOPIDOGREL (kloh PID oh grel) helps to prevent blood clots. This medicine is used to prevent heart attack, stroke, or other vascular events in people who are at high risk.  This  medicine may be used for other purposes; ask your health care provider or pharmacist if you have questions.  COMMON BRAND NAME(S): Plavix  What should I tell my health care provider before I take this medicine?  They need to know if you have any of the following conditions:  · bleeding disorders  · bleeding in the brain  · having surgery  · history of stomach bleeding  · an unusual or allergic reaction to clopidogrel, other medicines, foods, dyes, or preservatives  · pregnant or trying to get pregnant  · breast-feeding  How should I use this medicine?  Take this medicine by mouth with a glass of water. Follow the directions on the prescription label. You may take this medicine with or without food. If it upsets your stomach, take it with food. Take your medicine at regular intervals. Do not take it more often than directed. Do not stop taking except on your doctor's advice.  A special MedGuide will be given to you by the pharmacist with each prescription and refill. Be sure to read this information carefully each time.  Talk to your pediatrician regarding the use of this medicine in children. Special care may be needed.  Overdosage: If you think you have taken too much of this medicine contact a poison control center or emergency room at once.  NOTE: This medicine is only for you. Do not share this medicine with others.  What if I miss a dose?  If you miss a dose, take it as soon as you can. If it is almost time for your next dose, take only that dose. Do not take double or extra doses.  What may interact with this medicine?  Do not take this medicine with the following medications:  · dasabuvir; ombitasvir; paritaprevir; ritonavir  · defibrotide  · selexipag  This medicine may also interact with the following medications:  · certain medicines that treat or prevent blood clots like warfarin  · narcotic medicines for pain  · NSAIDs, medicines for pain and inflammation, like ibuprofen or  naproxen  · repaglinide  · SNRIs, medicines for depression, like desvenlafaxine, duloxetine, levomilnacipran, venlafaxine  · SSRIs, medicines for depression, like citalopram, escitalopram, fluoxetine, fluvoxamine, paroxetine, sertraline  · stomach acid blockers like cimetidine, esomeprazole, omeprazole  This list may not describe all possible interactions. Give your health care provider a list of all the medicines, herbs, non-prescription drugs, or dietary supplements you use. Also tell them if you smoke, drink alcohol, or use illegal drugs. Some items may interact with your medicine.  What should I watch for while using this medicine?  Visit your doctor or health care professional for regular check-ups. Do not stop taking your medicine unless your doctor tells you to.  Notify your doctor or health care professional and seek emergency treatment if you develop breathing problems; changes in vision; chest pain; severe, sudden headache; pain, swelling, warmth in the leg; trouble speaking; sudden numbness or weakness of the face, arm or leg. These can be signs that your condition has gotten worse.  If you are going to have surgery or dental work, tell your doctor or health care professional that you are taking this medicine.  Certain genetic factors may reduce the effect of this medicine. Your doctor may use genetic tests to determine treatment.  Only take aspirin if you are instructed to. Low doses of aspirin are used with this medicine to treat some conditions. Taking aspirin with this medicine can increase your risk of bleeding so you must be careful. Talk to your doctor or pharmacist if you have questions.  What side effects may I notice from receiving this medicine?  Side effects that you should report to your doctor or health care professional as soon as possible:  · allergic reactions like skin rash, itching or hives, swelling of the face, lips, or tongue  · signs and symptoms of bleeding such as bloody or black,  tarry stools; red or dark-brown urine; spitting up blood or brown material that looks like coffee grounds; red spots on the skin; unusual bruising or bleeding from the eye, gums, or nose  · signs and symptoms of a blood clot such as breathing problems; changes in vision; chest pain; severe, sudden headache; pain, swelling, warmth in the leg; trouble speaking; sudden numbness or weakness of the face, arm or leg  · signs and symptoms of low blood sugar such as feeling anxious; confusion; dizziness; increased hunger; unusually weak or tired; increased sweating; shakiness; cold, clammy skin; irritable; headache; blurred vision; fast heartbeat; loss of consciousness  Side effects that usually do not require medical attention (report to your doctor or health care professional if they continue or are bothersome):  · constipation  · diarrhea  · headache  · upset stomach  This list may not describe all possible side effects. Call your doctor for medical advice about side effects. You may report side effects to FDA at 3-495-QPB-3240.  Where should I keep my medicine?  Keep out of the reach of children.  Store at room temperature of 59 to 86 degrees F (15 to 30 degrees C). Throw away any unused medicine after the expiration date.  NOTE: This sheet is a summary. It may not cover all possible information. If you have questions about this medicine, talk to your doctor, pharmacist, or health care provider.  © 2020 [employer] Standard (2019-05-20 15:03:38)    Aspirin and Your Heart    Aspirin is a medicine that prevents the cells in the blood that are used for clotting, called platelets, from sticking together. Aspirin can be used to help reduce the risk of blood clots, heart attacks, and other heart-related problems.  Can I take aspirin?  Your health care provider will help you determine whether it is safe and beneficial for you to take aspirin daily. Taking aspirin daily may be helpful if you:  · Have had a heart attack or  chest pain.  · Are at risk for a heart attack.  · Have undergone open-heart surgery, such as coronary artery bypass surgery (CABG).  · Have had coronary angioplasty or a stent.  · Have had certain types of stroke or transient ischemic attack (TIA).  · Have peripheral artery disease (PAD).  · Have chronic heart rhythm problems such as atrial fibrillation and cannot take an anticoagulant.  · Have valve disease or have had surgery on a valve.  What are the risks?  Daily use of aspirin can cause side effects. Some of these include:  · Bleeding. Bleeding problems can be minor or serious. An example of a minor problem is a cut that does not stop bleeding. An example of a more serious problem is stomach bleeding or, rarely, bleeding into the brain. Your risk of bleeding is increased if you are also taking non-steroidal anti-inflammatory drugs (NSAIDs).  · Increased bruising.  · Upset stomach.  · An allergic reaction. People who have nasal polyps have an increased risk of developing an aspirin allergy.  General guidelines  · Take aspirin only as told by your health care provider. Make sure that you understand how much you should take and what form you should take. The two forms of aspirin are:  ? Non-enteric-coated.This type of aspirin does not have a coating and is absorbed quickly. This type of aspirin also comes in a chewable form.  ? Enteric-coated. This type of aspirin has a coating that releases the medicine very slowly. Enteric-coated aspirin might cause less stomach upset than non-enteric-coated aspirin. This type of aspirin should not be chewed or crushed.  · Limit alcohol intake to no more than 1 drink a day for nonpregnant women and 2 drinks a day for men. Drinking alcohol increases your risk of bleeding. One drink equals 12 oz of beer, 5 oz of wine, or 1½ oz of hard liquor.  Contact a health care provider if you:  · Have unusual bleeding or bruising.  · Have stomach pain or nausea.  · Have ringing in your  ears.  · Have an allergic reaction that causes:  ? Hives.  ? Itchy skin.  ? Swelling of the lips, tongue, or face.  Get help right away if you:  · Notice that your bowel movements are bloody, dark red, or black in color.  · Vomit or cough up blood.  · Have blood in your urine.  · Cough, have noisy breathing (wheeze), or feel short of breath.  · Have chest pain, especially if the pain spreads to the arms, back, neck, or jaw.  · Have a severe headache, or a headache with confusion, or dizziness.  These symptoms may represent a serious problem that is an emergency. Do not wait to see if the symptoms will go away. Get medical help right away. Call your local emergency services (911 in the [place] Do not drive yourself to the hospital.  Summary  · Aspirin can be used to help reduce the risk of blood clots, heart attacks, and other heart-related problems.  · Daily use of aspirin can increase your risk of side effects. Your health care provider will help you determine whether it is safe and beneficial for you to take aspirin daily.  · Take aspirin only as told by your health care provider. Make sure that you understand how much you can take and what form you can take.  This information is not intended to replace advice given to you by your health care provider. Make sure you discuss any questions you have with your health care provider.  Document Released: 11/30/2009 Document Revised: 10/18/2018 Document Reviewed: 10/18/2018  Elsevier Patient Education © 2020 [employer]      Depression / Suicide Risk    As you are discharged from this [facility] facility, it is important to learn how to keep safe from harming yourself.    Recognize the warning signs:  · Abrupt changes in personality, positive or negative- including increase in energy   · Giving away possessions  · Change in eating patterns- significant weight changes-  positive or negative  · Change in sleeping patterns- unable to sleep or sleeping all the  time   · Unwillingness or inability to communicate  · Depression  · Unusual sadness, discouragement and loneliness  · Talk of wanting to die  · Neglect of personal appearance   · Rebelliousness- reckless behavior  · Withdrawal from people/activities they love  · Confusion- inability to concentrate     If you or a loved one observes any of these behaviors or has concerns about self-harm, here's what you can do:  · Talk about it- your feelings and reasons for harming yourself  · Remove any means that you might use to hurt yourself (examples: pills, rope, extension cords, firearm)  · Get professional help from the community (Mental Health, Substance Abuse, psychological counseling)  · Do not be alone:Call your Safe Contact- someone whom you trust who will be there for you.  · Call your local CRISIS HOTLINE 244-5571 or 601-523-6237  · Call your local Children's Mobile Crisis Response Team [place] (577) 831-2788 or www.8thBridge  · Call the toll free National Suicide Prevention Hotlines   · National Suicide Prevention Lifeline 426-748-OLKC (9226)  · National Hope Line Network 800-SUICIDE (608-0078)

## 2020-07-24 NOTE — DISCHARGE PLANNING
Meds-to-Beds: Discharge prescription orders listed below delivered to patient's bedside. RN notified. Patient counseled.  Metformin and metoprolol too soon through insurance, dispensed from [facility] on 7/20. Patient confirms he has these medications available at home. Patient verbalized understanding to stop lisinopril/ HCTZ.     Mina Olmstead   Home Medication Instructions PONCHO:[id number]    Printed on:07/24/20 1389   Medication Information                      aspirin 81 MG EC tablet  Take 1 Tab by mouth every day.             atorvastatin (LIPITOR) 40 MG Tab  Take 1 Tab by mouth every evening.             clopidogrel (PLAVIX) 75 MG Tab  Take 1 Tab by mouth every day.             isosorbide dinitrate (ISORDIL) 10 MG Tab  Take 0.5 Tabs by mouth 2 times a day as needed (chest pain).             losartan (COZAAR) 50 MG Tab  Take 1 Tab by mouth every day.             spironolactone (ALDACTONE) 25 MG Tab  Take 1 Tab by mouth every day.               Jennifer Bojorquez, PharmD

## 2020-07-24 NOTE — PROGRESS NOTES
Discharge Summary  Educated patient on new medictions, up coming appointments, and s/s to look for when returning to the ER. Patient verbalized understanding. PIV's removed with no s/s of bleeding or infection. Vitals WNL. Escorted patient downstairs for discharge home.

## 2020-07-24 NOTE — CARE PLAN
Problem: Safety  Goal: Will remain free from injury  Outcome: PROGRESSING AS EXPECTED  Note: Fall precautions in place. Bed in lowest position. Non-skid socks in place. Personal possessions within reach. Mobility sign on door. Bed-alarm on. Call light within reach. Pt educated regarding fall prevention and states understanding.       Problem: Infection  Goal: Will remain free from infection  Note: Pt educated on importance of hand hygiene to reduce the risk of infection

## 2020-07-24 NOTE — PROGRESS NOTES
[facility] Cardiology Follow-up Note    Date of Service:    7/24/2020      Name:   Mina Olmstead   YOB: 1985  Age:   35 y.o.  male   MRN:   [id number]      Chief Complaint: NSTEMI    Primary Cardiologist:  Dr. Stallworth.    HPI:  Mr Burch is a 34 y/o fellow with PMH include premature CAD with hx of stents to the LAD in 2017, with ACS in 11/2018 s/p BELINDA to the LCx.  He has ICMO with LVEF 20-25%.  He had HTN, Diabetes mellitus type II, and asthma.   He presented to [facility] on 7/22/20 with c/o shortness of breath and chest pressure.     He has leukocytosis, KENDAL, troponin elevation consistent with NSTEMI.    States he had been out of his medications for approx 2 months due to PCP changes at his clinic and inability to obtain his meds.    Interim Events:  Feeling well today, no further chest pain  Has been ambulatory yesterday evening, walked without complication  No shortness of breath or RAMOS.  No LE swelling.      ROS  Constitutional:  denies fatigue.  Respiratory:  shortness of breath- improved, no cough.  Cardiovascular:  +chest pain.  denies lower extremity edema.  Denies orthopnea or PND.  :denies polyuria, no dysuria.  GI:  Denies nausea/vomiting.  No abdominal distention.  Neuro:  Denies dizziness, syncope.  Hem/lymph: Denies easy bleeding/bruising.      All other review of systems reviewed and negative.    Past medical, surgical, social, and family history reviewed and unchanged from admission except as noted in assessment and plan.    Medications: Reviewed in MAR  Current Facility-Administered Medications   Medication Dose Frequency Provider Last Rate Last Dose   • [START ON 7/25/2020] aspirin EC (ECOTRIN) tablet 81 mg  81 mg DAILY Mary Mendez P.A.-C.       • spironolactone (ALDACTONE) tablet 25 mg  25 mg Q DAY Jose Stallworth M.DSea   25 mg at 07/24/20 0446   • losartan (COZAAR) tablet 50 mg  50 mg Q DAY Jose To, M.D.   50 mg at 07/24/20 0446   •  senna-docusate (PERICOLACE or SENOKOT S) 8.6-50 MG per tablet 2 Tab  2 Tab BID JUSTIN ReynoldsO.   2 Tab at 07/22/20 1640    And   • polyethylene glycol/lytes (MIRALAX) PACKET 1 Packet  1 Packet QDAY PRN Donnie Eisenberg D.O.        And   • magnesium hydroxide (MILK OF MAGNESIA) suspension 30 mL  30 mL QDAY PRN Donnie Eisenberg D.O.        And   • bisacodyl (DULCOLAX) suppository 10 mg  10 mg QDAY PRN Donnie Eisenberg D.O.       • Respiratory Therapy Consult   Continuous RT Donnie Eisenberg D.O.       • acetaminophen (TYLENOL) tablet 650 mg  650 mg Q6HRS PRN Donnie Eisenberg D.O.       • atorvastatin (LIPITOR) tablet 40 mg  40 mg Q EVENING ROGELIO Reynolds.O.   40 mg at 07/23/20 1636   • morphine (pf) 4 MG/ML injection 2-4 mg  2-4 mg Q5 MIN PRN JUSTIN ReynoldsO.   2 mg at 07/22/20 1201   • nitroglycerin (NITROSTAT) tablet 0.4 mg  0.4 mg Q5 MIN PRN JUSTIN ReynoldsO.   0.4 mg at 07/22/20 1147   • ondansetron (ZOFRAN) syringe/vial injection 4 mg  4 mg Q4HRS PRN JUSTIN ReynoldsO.       • ondansetron (ZOFRAN ODT) dispertab 4 mg  4 mg Q4HRS PRN JUSTIN ReynoldsO.       • promethazine (PHENERGAN) tablet 12.5-25 mg  12.5-25 mg Q4HRS PRN JUSTIN ReynoldsO.       • promethazine (PHENERGAN) suppository 12.5-25 mg  12.5-25 mg Q4HRS PRN JUSTIN ReynoldsO.       • prochlorperazine (COMPAZINE) injection 5-10 mg  5-10 mg Q4HRS PRN JUSTIN ReynoldsO.       • insulin regular (HumuLIN R,NovoLIN R) injection  1-6 Units 4X/DAY ACHS Donnie Eisenberg D.O.   Stopped at 07/22/20 1100    And   • glucose 4 g chewable tablet 16 g  16 g Q15 MIN PRN Donnie Eisenberg D.O.        And   • dextrose 50% (D50W) injection 50 mL  50 mL Q15 MIN PRN Donnie Eisenberg D.O.       • hydrALAZINE (APRESOLINE) injection 20 mg  20 mg Q6HRS PRN Jenny Srinivasan D.O.   20 mg at 07/23/20 0944   • ipratropium-albuterol (DUONEB) nebulizer solution  3 mL Q4H PRN (RT) JUSTIN SampsonOSea   3 mL at 07/22/20 1646   • metoprolol SR (TOPROL  "XL) tablet 100 mg  100 mg BID Jose Stallworth M.D.   100 mg at 07/24/20 0446   • clopidogrel (PLAVIX) tablet 75 mg  75 mg DAILY Jose Stallworth M.D.   75 mg at 07/24/20 0446   • oxyCODONE immediate-release (ROXICODONE) tablet 5 mg  5 mg Q4HRS PRN Sukhjinder Saldana, A.P.N.   5 mg at 07/22/20 2147    Or   • oxyCODONE immediate release (ROXICODONE) tablet 10 mg  10 mg Q4HRS PRN Sukhjinder Saldana, A.P.N.       • diazePAM (VALIUM) tablet 2-5 mg  2-5 mg Q6HRS PRN Sukhjinder Blakee, A.P.N.       • isosorbide dinitrate (ISORDIL) tablet 5 mg  5 mg BID PRN Sukhjinder Saldana, A.P.N.       Last reviewed on 7/22/2020  2:16 AM by Stanislaw Garrido M.D.    No Known Allergies    Physical Exam  Body mass index is 34.4 kg/m². /94   Pulse 70   Temp 36.1 °C (97 °F) (Temporal)   Resp 16   Ht 1.626 m (5' 4\")   Wt 90.9 kg (200 lb 6.4 oz)   SpO2 97%    Vitals:    07/23/20 1616 07/23/20 2000 07/24/20 0000 07/24/20 0400   BP: 147/95 150/104 146/101 133/94   Pulse: 75 91 86 70   Resp: 16 16 16 16   Temp: 36.2 °C (97.1 °F) 36.3 °C (97.3 °F) 35.9 °C (96.6 °F) 36.1 °C (97 °F)   TempSrc: Temporal Temporal Temporal Temporal   SpO2: 97% 97% 96% 97%   Weight:  90.9 kg (200 lb 6.4 oz)     Height:        Oxygen Therapy:  Pulse Oximetry: 97 %, O2 (LPM): 0, O2 Delivery Device: None - Room Air    General: no apparent distress.  Eyes: normal conjunctiva  ENT: OP clear  Neck: no JVD, thick.   Lungs: normal respiratory effort,  without crackles, no wheezing or rhonchi.  Heart: normal rate,  regular rhythm, no murmur, no rubs or gallops,   EXT: no edema bilateral lower extremities. + bilateral pedal pulses. no cyanosis  Abdomen: soft, non tender, non distended,  Neurological: No focal deficits, no facial asymmetry.  Normal speech.  Psychiatric: Appropriate affect, alert and oriented x 3.   Skin: Warm extremities, no rash.    Labs (personally reviewed):     Lab Results   Component Value Date/Time    SODIUM 138 07/24/2020 04:17 AM    POTASSIUM 4.0 07/24/2020 04:17 " AM    CHLORIDE 103 2020 04:17 AM    CO2 20 2020 04:17 AM    GLUCOSE 133 (H) 2020 04:17 AM    BUN 26 (H) 2020 04:17 AM    CREATININE 1.21 2020 04:17 AM     Lab Results   Component Value Date/Time    ALKPHOSPHAT 50 2020 12:34 AM    ASTSGOT 21 2020 12:34 AM    ALTSGPT 17 2020 12:34 AM    TBILIRUBIN 0.4 2020 12:34 AM      Lab Results   Component Value Date/Time    CHOLSTRLTOT 164 2020 04:32 AM     (H) 2020 04:32 AM    HDL 34 (A) 2020 04:32 AM    TRIGLYCERIDE 116 2020 04:32 AM     Lab Results   Component Value Date/Time    BNPBTYPENAT 189 (H) 2018 10:11 PM         Cardiac Imaging and Procedures Review:      Personal Telemetry Review:    Personal EKG Interpretation 20 0919:  NS kelly HR 56, TWI in the lateral leads are chronic.  There is some J  Point elevate in leads V1-V3 - does not meet stemi criteria.    Echo 20:  CONCLUSIONS  Severely reduced left ventricular systolic function. Left ventricular   ejection fraction is visually estimated to be 20%.   Indeterminate diastolic function.  Normal inferior vena cava size without inspiratory collapse.    [facility] 2018:  POSTOPERATIVE DIAGNOSIS:  1.  100% occluded proximal dominant left circumflex coronary artery, thrombotic  2.  Patent mid and distal LAD stents with diffuse nonobstructive CAD throughout  3.  60% tandem RCA stenoses with diffuse distal small vessel CAD  4.  LVEF 50-55% preintervention  5.  Successful PCI LCx (3.0 x 33 mm Synergy BELINDA posted to 3.25 mm), excellent result DANAE-3 flow      Assessment and Medical Decision Makin   NSTEMI.  Troponin trending up from 113 to 667 yesterday.   EKG with chronic lateral ST changes.   Echo with LVEF 20% (also chronically reduced)    -   Heparin ggt completed x 48 hours - d/c this AM.  -   Continue 81 mg aspirin and plavix 75 mg.  -   Continue BB and high intensity statin.  -   Coronary angio and PCI has been deferred  due to pt's inability to guarantee compliance with DAPT.    2   ICMO without overt heart failure.  EF 20% (chronic)  -   Continue GDMT (which was recently restarted)  -   Losartan 25, Toprol , spironolactone 25.    3   Acute kidney injury.  -   Cr improved today.    4   Essential hypertension.  -  BP stable on current med regimen outlined above.    5   Diabetes mellitus type II, A1C 6.6    6   Sinus bradycardia.      Cardiology will sign off at this time.  Please contact our service directly with further questions/concerns.      Future Appointments   Date Time Provider Department Center   7/28/2020  3:20 PM Song Camarillo M.D. [facility] None   8/6/2020  9:45 AM Jose Stallworth M.D. [facility] None           Mary Mendez PA-C  [facility]

## 2020-07-24 NOTE — PROGRESS NOTES
Received report from day shift RN, Kayleen. Pt is resting comfortably in bed and does not appear to be in any pain or distress. Call light and personal belongings within reach, bed in lowest locked position. POC addressed, white board updated. No further questions at this time.

## 2020-07-27 DIAGNOSIS — M10.9 GOUTY ARTHROPATHY: ICD-10-CM

## 2020-07-28 ENCOUNTER — OFFICE VISIT (OUTPATIENT)
Dept: MEDICAL GROUP | Facility: LAB | Age: 35
End: 2020-07-28
Payer: COMMERCIAL

## 2020-07-28 VITALS
SYSTOLIC BLOOD PRESSURE: 140 MMHG | BODY MASS INDEX: 33.8 KG/M2 | HEART RATE: 68 BPM | WEIGHT: 198 LBS | TEMPERATURE: 98.6 F | OXYGEN SATURATION: 96 % | HEIGHT: 64 IN | DIASTOLIC BLOOD PRESSURE: 94 MMHG | RESPIRATION RATE: 12 BRPM

## 2020-07-28 DIAGNOSIS — I25.5 ISCHEMIC CARDIOMYOPATHY: ICD-10-CM

## 2020-07-28 DIAGNOSIS — Z72.0 TOBACCO ABUSE: ICD-10-CM

## 2020-07-28 DIAGNOSIS — I25.119 CORONARY ARTERY DISEASE INVOLVING NATIVE CORONARY ARTERY OF NATIVE HEART WITH ANGINA PECTORIS (HCC): ICD-10-CM

## 2020-07-28 DIAGNOSIS — N17.9 ACUTE RENAL FAILURE, UNSPECIFIED ACUTE RENAL FAILURE TYPE (HCC): ICD-10-CM

## 2020-07-28 DIAGNOSIS — E66.9 OBESITY (BMI 35.0-39.9 WITHOUT COMORBIDITY): ICD-10-CM

## 2020-07-28 PROCEDURE — 99214 OFFICE O/P EST MOD 30 MIN: CPT | Performed by: FAMILY MEDICINE

## 2020-07-28 RX ORDER — INDOMETHACIN 75 MG/1
CAPSULE, EXTENDED RELEASE ORAL
Qty: 20 CAP | Refills: 0 | Status: SHIPPED | OUTPATIENT
Start: 2020-07-28 | End: 2020-12-09

## 2020-07-28 ASSESSMENT — ENCOUNTER SYMPTOMS
DIARRHEA: 0
CHILLS: 0
COUGH: 0
VOMITING: 0
SHORTNESS OF BREATH: 0
FEVER: 0
SINUS PAIN: 0
NAUSEA: 0

## 2020-07-28 ASSESSMENT — FIBROSIS 4 INDEX: FIB4 SCORE: 0.6

## 2020-07-28 NOTE — PROGRESS NOTES
"Subjective:     CC: Follow-up hospitalization    HPI:   Mina presents today to follow-up on hospitalization.    He was admitted 7/22-7/24 for shortness of breath and chest pain.  He had ST elevation noted to be J-point elevation by cardiology and elevated troponins.  Cardiology did not proceed with invasive cardiac intervention due to his nonadherence and his inability to continue on regular medications.  EF was noted to be 20% on echo.  He also had KENDAL with a negative renal ultrasound, this improved with fluids.    He was treated with 48 hours of heparin drip for an NSTEMI and was cleared by cardiology for discharge. His hydrochlorothiazide was stopped on discharge.  Felt well since discharge and has not had any chest pain, shortness of breath, or swelling.    He has decreased to 1-2 cigarettes daily.  He has lost 40 lbs since April - hiking daily, gym 2x weekly, active at work. Reduced red meat in diet.    Health Maintenance: Completed    Medications, past medical history, allergies, and social history have been reviewed and updated.    ROS:  Review of Systems   Constitutional: Negative for chills and fever.   HENT: Negative for sinus pain.    Respiratory: Negative for cough and shortness of breath.    Gastrointestinal: Negative for diarrhea, nausea and vomiting.        Objective:       Exam:  /94 (BP Location: Right arm, Patient Position: Sitting, BP Cuff Size: Large adult)   Pulse 68   Temp 37 °C (98.6 °F)   Resp 12   Ht 1.626 m (5' 4\")   Wt 89.8 kg (198 lb)   SpO2 96%   BMI 33.99 kg/m²  Body mass index is 33.99 kg/m².    Constitutional: Alert. Well appearing. No distress.  Skin: Warm, dry, good turgor, no visible rashes.  Eye: Equal, round and reactive to light, conjunctiva clear, lids normal.  ENMT: Moist mucous membranes. Normal dentition.  Respiratory: Normal effort. Lungs are clear to auscultation bilaterally.  Cardiovascular: Regular rate and rhythm. Normal S1/S2. No murmurs, rubs or " gallops.   Ext: Trace lower extremity edema.  Neuro: Moves all four extremities. No facial droop.  Psych: Answers questions appropriately. Normal affect and mood.    Assessment & Plan:     35 y.o. male with the following -     1. Coronary artery disease involving native coronary artery of native heart with angina pectoris (HCC)  2. Ischemic cardiomyopathy  Recent hospitalization for NSTEMI.  Doing well since discharge.  He has follow-up scheduled with cardiology next week and stressed importance of keeping this appointment.  He continues on Plavix, Lipitor, losartan, metoprolol, and spironolactone.  Blood pressure borderline today and his hydrochlorothiazide was stopped on discharge.  Rechecking labs as below and will defer to cardiology for possible medication changes.    3. Acute renal failure, unspecified acute renal failure type (HCC)  Noted on recent hospitalization.  Improved with hydration and renal ultrasound was negative.  Rechecking labs as below.  - Basic Metabolic Panel; Future  - CBC WITH DIFFERENTIAL; Future    4. Obesity (BMI 35.0-39.9 without comorbidity) (HCC)  Chronic issue.  Discussed importance of diet and lifestyle in regards to his medical conditions today.  Reports has lost 40 pounds since April with regular exercise and diet improvements.  Encouraged to continue this.     5. Tobacco abuse  He has cut back to 1 to 2 cigarettes daily and encouraged complete cessation.    Please note that this note was created using voice recognition software.

## 2020-08-06 ENCOUNTER — OFFICE VISIT (OUTPATIENT)
Dept: CARDIOLOGY | Facility: MEDICAL CENTER | Age: 35
End: 2020-08-06
Payer: COMMERCIAL

## 2020-08-06 VITALS
WEIGHT: 205.6 LBS | HEIGHT: 64 IN | SYSTOLIC BLOOD PRESSURE: 168 MMHG | DIASTOLIC BLOOD PRESSURE: 100 MMHG | OXYGEN SATURATION: 96 % | RESPIRATION RATE: 16 BRPM | HEART RATE: 65 BPM | BODY MASS INDEX: 35.1 KG/M2

## 2020-08-06 DIAGNOSIS — Z91.148 NON COMPLIANCE W MEDICATION REGIMEN: ICD-10-CM

## 2020-08-06 DIAGNOSIS — I10 HTN (HYPERTENSION), MALIGNANT: ICD-10-CM

## 2020-08-06 DIAGNOSIS — Z79.899 HIGH RISK MEDICATION USE: ICD-10-CM

## 2020-08-06 DIAGNOSIS — I25.10 CORONARY ARTERY DISEASE INVOLVING NATIVE CORONARY ARTERY OF NATIVE HEART WITHOUT ANGINA PECTORIS: ICD-10-CM

## 2020-08-06 DIAGNOSIS — I51.89 LEFT VENTRICULAR SYSTOLIC DYSFUNCTION, NYHA CLASS 2: ICD-10-CM

## 2020-08-06 DIAGNOSIS — I25.5 ISCHEMIC CARDIOMYOPATHY: ICD-10-CM

## 2020-08-06 DIAGNOSIS — I50.20 ACC/AHA STAGE C SYSTOLIC HEART FAILURE (HCC): ICD-10-CM

## 2020-08-06 DIAGNOSIS — R06.09 DYSPNEA ON EXERTION: ICD-10-CM

## 2020-08-06 DIAGNOSIS — Z95.5 STENTED CORONARY ARTERY: ICD-10-CM

## 2020-08-06 PROCEDURE — 99215 OFFICE O/P EST HI 40 MIN: CPT | Performed by: INTERNAL MEDICINE

## 2020-08-06 RX ORDER — LOSARTAN POTASSIUM 100 MG/1
100 TABLET ORAL DAILY
Qty: 30 TAB | Refills: 11 | Status: SHIPPED | OUTPATIENT
Start: 2020-08-06 | End: 2020-09-01 | Stop reason: SDUPTHER

## 2020-08-06 ASSESSMENT — ENCOUNTER SYMPTOMS
ABDOMINAL PAIN: 0
BLURRED VISION: 0
FEVER: 0
SHORTNESS OF BREATH: 1
HEADACHES: 0
DEPRESSION: 0
MEMORY LOSS: 0
COUGH: 0
DOUBLE VISION: 0
FALLS: 0
PALPITATIONS: 0
SENSORY CHANGE: 0
DIAPHORESIS: 0
BRUISES/BLEEDS EASILY: 0
DIZZINESS: 0
MYALGIAS: 0

## 2020-08-06 ASSESSMENT — FIBROSIS 4 INDEX: FIB4 SCORE: 0.6

## 2020-08-06 NOTE — PROGRESS NOTES
Chief Complaint   Patient presents with   • Hypertension       Subjective:   Mina Burch is a 35 y.o. male who presents today in heart failure clinic for cardiac care due to ischemic cardiomyopathy, established coronary disease status post LAD and circumflex stents, hypertension, hyperlipidemia and remote history of methamphetamine use.    Patient was recently in the hospital due to non-ST elevation myocardial infarction.  At that time, patient also stopped taking his medication prior to be admitted to the hospital for quite some time.  Patient was opted out for medical therapy for his non-ST elevation myocardial infarction episode due to the nature of his noncompliance.    Patient is here today without any chest pain.  He is feeling better.  He does get shortness of breath with incline.  No symptoms at rest.    Past Medical History:   Diagnosis Date   • Diabetes ([facility])    • Hyperlipidemia    • Hypertension    • MI (myocardial infarction) ([facility]) 2017    x2 stent placed   • Myocardial infarct ([facility]) 11/2018    1 stent place     Past Surgical History:   Procedure Laterality Date   • OTHER CARDIAC SURGERY  11/2018    1 stent placed   • OTHER CARDIAC SURGERY  09/18/2017    2 stents     Family History   Problem Relation Age of Onset   • Heart Disease Father    • Hypertension Father    • Heart Attack Father 45   • Diabetes Mother    • Hypertension Mother    • Cancer Maternal Grandfather         Brain    • No Known Problems Paternal Grandmother    • No Known Problems Paternal Grandfather    • No Known Problems Son    • No Known Problems Daughter      Social History     Socioeconomic History   • Marital status:      Spouse name: Not on file   • Number of children: Not on file   • Years of education: Not on file   • Highest education level: Not on file   Occupational History     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   Social Needs   • Financial resource strain: Not on file   • Food insecurity     Worry: Not on  file     Inability: Not on file   • Transportation needs     Medical: Not on file     Non-medical: Not on file   Tobacco Use   • Smoking status: Former Smoker     Packs/day: 0.50     Years: 14.00     Pack years: 7.00     Types: Cigarettes     Quit date: 2020     Years since quittin.0   • Smokeless tobacco: Former User     Types: Chew     Quit date: 2018   Substance and Sexual Activity   • Alcohol use: Yes     Comment: occasionally    • Drug use: Yes     Types: Marijuana   • Sexual activity: Never     Partners: Female     Comment:     Lifestyle   • Physical activity     Days per week: Not on file     Minutes per session: Not on file   • Stress: Not on file   Relationships   • Social connections     Talks on phone: Not on file     Gets together: Not on file     Attends [employer] service: Not on file     Active member of club or organization: Not on file     Attends meetings of clubs or organizations: Not on file     Relationship status: Not on file   • Intimate partner violence     Fear of current or ex partner: Not on file     Emotionally abused: Not on file     Physically abused: Not on file     Forced sexual activity: Not on file   Other Topics Concern   • Not on file   Social History Narrative   • Not on file     No Known Allergies  Outpatient Encounter Medications as of 2020   Medication Sig Dispense Refill   • indomethacin SR (INDOCIN SR) 75 MG Cap CR TAKE 1 CAPSULE BY MOUTH TWICE DAILY WITH MEALS AS NEEDED 20 Cap 0   • atorvastatin (LIPITOR) 40 MG Tab Take 1 Tab by mouth every evening. 30 Tab 0   • metoprolol SR (TOPROL XL) 100 MG TABLET SR 24 HR Take 1 Tab by mouth 2 Times a Day. 30 Tab 0   • aspirin 81 MG EC tablet Take 1 Tab by mouth every day. 30 Tab 0   • clopidogrel (PLAVIX) 75 MG Tab Take 1 Tab by mouth every day. 30 Tab 0   • losartan (COZAAR) 50 MG Tab Take 1 Tab by mouth every day. 30 Tab 0   • spironolactone (ALDACTONE) 25 MG Tab Take 1 Tab by mouth every day. 30 Tab 0   •  "metFORMIN (GLUCOPHAGE) 500 MG Tab Take 1 Tab by mouth 2 Times a Day for 30 days. 60 Tab 0   • isosorbide dinitrate (ISORDIL) 10 MG Tab Take 0.5 Tabs by mouth 2 times a day as needed (chest pain). 15 Tab 0   • allopurinol (ZYLOPRIM) 100 MG Tab Take 1 Tab by mouth every day. 30 Tab 0     No facility-administered encounter medications on file as of 8/6/2020.      Review of Systems   Constitutional: Negative for diaphoresis and fever.   HENT: Negative for nosebleeds.    Eyes: Negative for blurred vision and double vision.   Respiratory: Positive for shortness of breath. Negative for cough.    Cardiovascular: Negative for chest pain and palpitations.   Gastrointestinal: Negative for abdominal pain.   Genitourinary: Negative for dysuria and frequency.   Musculoskeletal: Negative for falls and myalgias.   Skin: Negative for rash.   Neurological: Negative for dizziness, sensory change and headaches.   Endo/Heme/Allergies: Does not bruise/bleed easily.   Psychiatric/Behavioral: Negative for depression and memory loss.        Objective:   BP (!) 168/100 (BP Location: Left arm, Patient Position: Sitting, BP Cuff Size: Adult)   Pulse 65   Resp 16   Ht 1.626 m (5' 4\")   Wt 93.3 kg (205 lb 9.6 oz)   SpO2 96%   BMI 35.29 kg/m²     Physical Exam   Constitutional: He is oriented to person, place, and time. No distress.   HENT:   Head: Normocephalic and atraumatic.   Right Ear: External ear normal.   Left Ear: External ear normal.   Eyes: Right eye exhibits no discharge. Left eye exhibits no discharge.   Neck: No JVD present. No thyromegaly present.   Cardiovascular: Normal rate, regular rhythm, normal heart sounds and intact distal pulses. Exam reveals no gallop and no friction rub.   No murmur heard.  Pulmonary/Chest: Breath sounds normal. No respiratory distress.   Abdominal: Bowel sounds are normal. He exhibits no distension. There is no abdominal tenderness.   Musculoskeletal:         General: No tenderness or edema. "   Neurological: He is alert and oriented to person, place, and time. No cranial nerve deficit.   Skin: Skin is warm and dry. He is not diaphoretic.   Psychiatric: He has a normal mood and affect. His behavior is normal.   Nursing note and vitals reviewed.      Assessment:     1. ACC/AHA stage C systolic heart failure (HCC)     2. Left ventricular systolic dysfunction, NYHA class 2     3. HTN (hypertension), malignant     4. Dyspnea on exertion     5. High risk medication use     6. Coronary artery disease involving native coronary artery of native heart without angina pectoris     7. Stented coronary artery     8. Ischemic cardiomyopathy         Medical Decision Making:  Today's Assessment / Status / Plan:   Ischemic Cardiomyopathy:  Chronically illed condition which requires ongoing close monitoring and treatment to improve survival rate along with decreasing risk of clinical decompensation and hospitalization.    Today, based on physical examination findings, patient is euvolemic. No JVD, lungs are clear to auscultation, no pitting edema in bilateral lower extremities, no ascites.     Dry weight is 205 lbs.    Will increase Losartan to 100 mg po daily.     Continue Toprol  mg po bid.      Aldactone antagonist with Spironolactone 25 mg daily.    Not ICD candiate untril patient shows consistent medical compliance and follow up.     Coronary arterial disease s/p LAD stents (2017) and Cirx stent (2018):  Continue Clopidogrel, asa, BB, ARB and statin at current dose.      Hypertension:  Optimize control using cardiomyopathy medical regimen as well.    Hyperlipidemia:  Optimize statin as within guidelines of CAD treatment as above.

## 2020-08-10 ENCOUNTER — HOSPITAL ENCOUNTER (EMERGENCY)
Facility: MEDICAL CENTER | Age: 35
End: 2020-08-11
Attending: EMERGENCY MEDICINE
Payer: COMMERCIAL

## 2020-08-10 DIAGNOSIS — I48.0 PAROXYSMAL ATRIAL FIBRILLATION (HCC): ICD-10-CM

## 2020-08-10 LAB
ANION GAP SERPL CALC-SCNC: 15 MMOL/L (ref 7–16)
BASOPHILS # BLD AUTO: 1 % (ref 0–1.8)
BASOPHILS # BLD: 0.09 K/UL (ref 0–0.12)
BUN SERPL-MCNC: 20 MG/DL (ref 8–22)
CALCIUM SERPL-MCNC: 9.6 MG/DL (ref 8.5–10.5)
CHLORIDE SERPL-SCNC: 99 MMOL/L (ref 96–112)
CO2 SERPL-SCNC: 24 MMOL/L (ref 20–33)
CREAT SERPL-MCNC: 1.3 MG/DL (ref 0.5–1.4)
EKG IMPRESSION: NORMAL
EOSINOPHIL # BLD AUTO: 0.18 K/UL (ref 0–0.51)
EOSINOPHIL NFR BLD: 2 % (ref 0–6.9)
ERYTHROCYTE [DISTWIDTH] IN BLOOD BY AUTOMATED COUNT: 38.8 FL (ref 35.9–50)
GLUCOSE SERPL-MCNC: 138 MG/DL (ref 65–99)
HCT VFR BLD AUTO: 49.3 % (ref 42–52)
HGB BLD-MCNC: 16.5 G/DL (ref 14–18)
IMM GRANULOCYTES # BLD AUTO: 0.02 K/UL (ref 0–0.11)
IMM GRANULOCYTES NFR BLD AUTO: 0.2 % (ref 0–0.9)
LYMPHOCYTES # BLD AUTO: 3.21 K/UL (ref 1–4.8)
LYMPHOCYTES NFR BLD: 35.1 % (ref 22–41)
MAGNESIUM SERPL-MCNC: 1.8 MG/DL (ref 1.5–2.5)
MCH RBC QN AUTO: 28.5 PG (ref 27–33)
MCHC RBC AUTO-ENTMCNC: 33.5 G/DL (ref 33.7–35.3)
MCV RBC AUTO: 85.3 FL (ref 81.4–97.8)
MONOCYTES # BLD AUTO: 0.6 K/UL (ref 0–0.85)
MONOCYTES NFR BLD AUTO: 6.6 % (ref 0–13.4)
NEUTROPHILS # BLD AUTO: 5.04 K/UL (ref 1.82–7.42)
NEUTROPHILS NFR BLD: 55.1 % (ref 44–72)
NRBC # BLD AUTO: 0 K/UL
NRBC BLD-RTO: 0 /100 WBC
PLATELET # BLD AUTO: 316 K/UL (ref 164–446)
PMV BLD AUTO: 9.7 FL (ref 9–12.9)
POTASSIUM SERPL-SCNC: 4 MMOL/L (ref 3.6–5.5)
RBC # BLD AUTO: 5.78 M/UL (ref 4.7–6.1)
SODIUM SERPL-SCNC: 138 MMOL/L (ref 135–145)
WBC # BLD AUTO: 9.1 K/UL (ref 4.8–10.8)

## 2020-08-10 PROCEDURE — 96375 TX/PRO/DX INJ NEW DRUG ADDON: CPT

## 2020-08-10 PROCEDURE — 96374 THER/PROPH/DIAG INJ IV PUSH: CPT

## 2020-08-10 PROCEDURE — 99285 EMERGENCY DEPT VISIT HI MDM: CPT

## 2020-08-10 PROCEDURE — 85025 COMPLETE CBC W/AUTO DIFF WBC: CPT

## 2020-08-10 PROCEDURE — 80076 HEPATIC FUNCTION PANEL: CPT

## 2020-08-10 PROCEDURE — 93005 ELECTROCARDIOGRAM TRACING: CPT | Performed by: EMERGENCY MEDICINE

## 2020-08-10 PROCEDURE — 36415 COLL VENOUS BLD VENIPUNCTURE: CPT

## 2020-08-10 PROCEDURE — 83735 ASSAY OF MAGNESIUM: CPT

## 2020-08-10 PROCEDURE — 80048 BASIC METABOLIC PNL TOTAL CA: CPT

## 2020-08-10 RX ORDER — METOPROLOL SUCCINATE 50 MG/1
100 TABLET, EXTENDED RELEASE ORAL ONCE
Status: DISCONTINUED | OUTPATIENT
Start: 2020-08-11 | End: 2020-08-10

## 2020-08-10 ASSESSMENT — FIBROSIS 4 INDEX: FIB4 SCORE: 0.6

## 2020-08-11 VITALS
BODY MASS INDEX: 34.25 KG/M2 | RESPIRATION RATE: 16 BRPM | HEIGHT: 64 IN | DIASTOLIC BLOOD PRESSURE: 94 MMHG | HEART RATE: 69 BPM | SYSTOLIC BLOOD PRESSURE: 139 MMHG | OXYGEN SATURATION: 91 % | TEMPERATURE: 97.2 F | WEIGHT: 200.62 LBS

## 2020-08-11 LAB
ALBUMIN SERPL BCP-MCNC: 4.3 G/DL (ref 3.2–4.9)
ALP SERPL-CCNC: 50 U/L (ref 30–99)
ALT SERPL-CCNC: 24 U/L (ref 2–50)
AST SERPL-CCNC: 19 U/L (ref 12–45)
BILIRUB CONJ SERPL-MCNC: <0.2 MG/DL (ref 0.1–0.5)
BILIRUB INDIRECT SERPL-MCNC: NORMAL MG/DL (ref 0–1)
BILIRUB SERPL-MCNC: 0.4 MG/DL (ref 0.1–1.5)
EKG IMPRESSION: NORMAL
EKG IMPRESSION: NORMAL
PROT SERPL-MCNC: 7.2 G/DL (ref 6–8.2)

## 2020-08-11 PROCEDURE — A9270 NON-COVERED ITEM OR SERVICE: HCPCS | Performed by: EMERGENCY MEDICINE

## 2020-08-11 PROCEDURE — 700102 HCHG RX REV CODE 250 W/ 637 OVERRIDE(OP): Performed by: EMERGENCY MEDICINE

## 2020-08-11 PROCEDURE — 700101 HCHG RX REV CODE 250: Performed by: EMERGENCY MEDICINE

## 2020-08-11 PROCEDURE — 93005 ELECTROCARDIOGRAM TRACING: CPT | Performed by: EMERGENCY MEDICINE

## 2020-08-11 RX ORDER — METOPROLOL TARTRATE 1 MG/ML
5 INJECTION, SOLUTION INTRAVENOUS ONCE
Status: COMPLETED | OUTPATIENT
Start: 2020-08-11 | End: 2020-08-11

## 2020-08-11 RX ADMIN — METOPROLOL TARTRATE 25 MG: 25 TABLET, FILM COATED ORAL at 00:34

## 2020-08-11 RX ADMIN — RIVAROXABAN 20 MG: 20 TABLET, FILM COATED ORAL at 01:47

## 2020-08-11 RX ADMIN — METOPROLOL TARTRATE 5 MG: 5 INJECTION, SOLUTION INTRAVENOUS at 00:34

## 2020-08-11 NOTE — ED NOTES
Patient educated on discharge instructions, follow up appointments, prescriptions, and home care. Patient verbalized understanding. Patient ambulated to [facility].

## 2020-08-11 NOTE — ED PROVIDER NOTES
ED PROVIDER NOTE    Scribed for Mary Jackson M.D. by Laly Valverde. 8/11/2020, 1:13 AM.    This is an addendum to the note on Mina Burch. For further details and full chart entry, see the previously signed ED Provider Note written by Dr. Jose (ERP).      1:13 AM - I discussed the patient's case with Dr. Jose (ERP) who will transfer care of the patient to me at this time. If LFTs normal patient will be discharged.        The patient's continuing management included following up liver function panel.  If normal patient can be discharged with Xarelto and follow-up with cardiology.  Liver function panel returns and is normal patient is discharged with Xarelto in stable condition.    FINAL IMPRESSION   Paroxysmal atrial fibrillation     Laly ROLDAN (Scribe), am scribing for, and in the presence of, Mary Jackson M.D..    Electronically signed by: Laly Valverde (Scribe), 8/11/2020    Mary ROLDAN M.D. personally performed the services described in this documentation, as scribed by Laly Valverde in my presence, and it is both accurate and complete.    The note accurately reflects work and decisions made by me.  Mary Jackson M.D.  8/11/2020  2:36 AM

## 2020-08-11 NOTE — CONSULTS
Mina Burch is a 35 y.o. male who was seen recently in heart failure clinic for cardiac care due to ischemic cardiomyopathy, established coronary disease status post LAD and circumflex stents, hypertension, hyperlipidemia and remote history of methamphetamine use. Also history of not taking his medications as advised. Was recently in hospital due to ACS (treated medically as patient was not able to guarantee compliance with medications). Now back in ER with elevated SBP and atrial arrhythmias. Rate is controlled. Rate control strategy for now. Hold anticoagulation for now to reduce adviser events due to non-dependable habit of following medical advices.  Suspect patient is not taking his medications.  Clinically stable.  Restart home medications and stress importance of compliance.  Ok to to be discharged from ER.  Follow closely in HF clinic if patient could make appointments.    Jose Stallworth M.D.

## 2020-08-11 NOTE — ED PROVIDER NOTES
ED Provider Note      Means of Arrival: Private vehicle  History obtained from: Patient, medical record      CHIEF COMPLAINT  Chief Complaint   Patient presents with   • Irregular Heart Beat     Heart rate 179 per smart device, denies pain       HPI  Mina Burch is a 35 y.o. male who presents with palpitations.  Patient reports that approximate 1030 tonight after eating dinner he had sudden onset of palpitations, with a maximum heart rate on his iWatch of 179.  He denies any chest pain, lightheadedness, prior history of these.  Denies any leg swelling.  He reports he has been taking his heart medications.    REVIEW OF SYSTEMS  CONSTITUTIONAL:  No fever.  CARDIOVASCULAR:   See HPI  RESPIRATORY:  No pleuritic chest pain.  GASTROINTESTINAL:  No abdominal pain.  GENITOURINARY:   No dysuria.  MUSCULOSKELETAL:  No arthralgia.  SKIN:  No rash or suspicious lesions.  NEUROLOGIC:   No headache.  ENDOCRINE:  No facial edema.  HEMATOLOGIC:  No abnormal bleeding.       See HPI for further details.   All other systems are negative.     PAST MEDICAL HISTORY  Past Medical History:   Diagnosis Date   • Diabetes (HCC)    • Hyperlipidemia    • Hypertension    • MI (myocardial infarction) (HCC) 2017    x2 stent placed   • Myocardial infarct (HCC) 11/2018    1 stent place       FAMILY HISTORY  Family History   Problem Relation Age of Onset   • Heart Disease Father    • Hypertension Father    • Heart Attack Father 45   • Diabetes Mother    • Hypertension Mother    • Cancer Maternal Grandfather         Brain    • No Known Problems Paternal Grandmother    • No Known Problems Paternal Grandfather    • No Known Problems Son    • No Known Problems Daughter        SOCIAL HISTORY   reports that he quit smoking about 3 weeks ago. His smoking use included cigarettes. He has a 7.00 pack-year smoking history. He quit smokeless tobacco use about 20 months ago.  His smokeless tobacco use included chew. He reports current alcohol use. He  "reports current drug use. Drug: Marijuana.    SURGICAL HISTORY  Past Surgical History:   Procedure Laterality Date   • OTHER CARDIAC SURGERY  11/2018    1 stent placed   • OTHER CARDIAC SURGERY  09/18/2017    2 stents       CURRENT MEDICATIONS  Home Medications     Reviewed by Jimy White R.N. (Registered Nurse) on 08/10/20 at 2252  Med List Status: Partial   Medication Last Dose Status   allopurinol (ZYLOPRIM) 100 MG Tab  Active   aspirin 81 MG EC tablet  Active   atorvastatin (LIPITOR) 40 MG Tab  Active   clopidogrel (PLAVIX) 75 MG Tab  Active   indomethacin SR (INDOCIN SR) 75 MG Cap CR  Active   isosorbide dinitrate (ISORDIL) 10 MG Tab  Active   losartan (COZAAR) 100 MG Tab  Active   metFORMIN (GLUCOPHAGE) 500 MG Tab  Active   metoprolol SR (TOPROL XL) 100 MG TABLET SR 24 HR  Active   spironolactone (ALDACTONE) 25 MG Tab  Active                ALLERGIES  No Known Allergies    PHYSICAL EXAM  VITAL SIGNS: BP (!) 169/100   Pulse 73   Temp 36.2 °C (97.2 °F) (Oral)   Resp 14   Ht 1.626 m (5' 4\") Comment: Simultaneous filing. User may not have seen previous data.  Wt 91 kg (200 lb 9.9 oz)   SpO2 90%   BMI 34.44 kg/m²    Con: Comfortable  HENT: Normocephalic, atraumatic, Oropharynx within normal limits  Eyes: Pupils equal, round and reactive to light, Extraocular muscles intact  Resp: Clear to auscultation, no wheezes, rales or crackles  CV: Regular Rate and Rhythm, Normal first and second heart sounds, no murmurs, rubs or gallups.  Equal radial pulses bilaterally.  No pedal edema, no calf tenderness  Abd: Soft, Nontender, Nondistended, no rebound or guarding.  : No costovertebral angle tenderness  MSK: No deformities  Skin: No rash, Warm and dry, No petechiae  Neuro: Speech fluent  Psych: Normal mood/mentation      RADIOLOGY/PROCEDURES  No orders to display       LABS  Labs Reviewed   CBC WITH DIFFERENTIAL - Abnormal; Notable for the following components:       Result Value    MCHC 33.5 (*)     All other " components within normal limits   BASIC METABOLIC PANEL - Abnormal; Notable for the following components:    Glucose 138 (*)     All other components within normal limits   MAGNESIUM   ESTIMATED GFR   HEPATIC FUNCTION PANEL        EKG  Results for orders placed or performed during the hospital encounter of 08/10/20   EKG   Result Value Ref Range    Report       [facility] Emergency Dept.    Test Date:  2020-08-10  Pt Name:    BRYCE CONNER         Department: ER  MRN:        [id number]                      Room:  Gender:     Male                         Technician: [id number]  :        1985                   Requested By:ER TRIAGE PROTOCOL  Order #:    [id number]                    Kris MD: Kristine Wong    Measurements  Intervals                                Axis  Rate:       162                          P:  NV:                                      QRS:        -24  QRSD:       96                           T:          182  QT:         324  QTc:        533    Interpretive Statements  A-FLUTTER W/ VARIED AV BLOCK, A-RATE 326  RSR' IN V1 OR V2, PROBABLY NORMAL VARIANT  LVH WITH SECONDARY REPOLARIZATION ABNORMALITY  PROLONGED QT INTERVAL  Compared to ECG 2020 12:36:28  RSR' in V1 or V2 now present  Left ventricular hypertrophy now present  Early repolarization now present  Sinus bradycardia no longer present  At rial premature complex(es) no longer present  Myocardial infarct finding no longer present  Electronically Signed On 8- 23:25:43 PDT by Kristine Wong     EKG (NOW)   Result Value Ref Range    Report       [facility] Emergency Dept.    Test Date:  2020  Pt Name:    BRYCE CONNER         Department: ER  MRN:        [id number]                      Room:       RD 03  Gender:     Male                         Technician: [id number]  :        1985                   Requested By:KRISTINE WONG  Order #:    [id number]                    Kris MD:  Kristine Wong    Measurements  Intervals                                Axis  Rate:       67                           P:          44  CA:         148                          QRS:        -27  QRSD:       100                          T:          163  QT:         416  QTc:        439    Interpretive Statements  SINUS RHYTHM  PROBABLE LEFT ATRIAL ABNORMALITY  RSR' IN V1 OR V2, PROBABLY NORMAL VARIANT  LVH WITH SECONDARY REPOLARIZATION ABNORMALITY  ANTERIOR ST ELEVATION, PROBABLY DUE TO LVH  Compared to ECG 08/10/2020 23:58:00  ST (T wave) deviation now present  Sinus tachycardia no longer present  Left-axis deviation no longe r present  Prolonged QT interval no longer present  Electronically Signed On 2020 0:49:35 PDT by Kristine Wong     EKG   Result Value Ref Range    Report       [facility] Emergency Dept.    Test Date:  2020-08-10  Pt Name:    BRYCE CONNER         Department: ER  MRN:        [id number]                      Room:       [facility]  Gender:     Male                         Technician: [id number]  :        1985                   Requested By:KRISTINE WONG  Order #:    [id number]                    Reading MD: Kristine Wong    Measurements  Intervals                                Axis  Rate:       130                          P:          -22  CA:         180                          QRS:        -30  QRSD:       98                           T:          165  QT:         344  QTc:        506    Interpretive Statements  ATRIAL FIBRILLATION  LEFT AXIS DEVIATION  RSR' IN V1 OR V2, PROBABLY NORMAL VARIANT  LVH WITH SECONDARY REPOLARIZATION ABNORMALITY  PROLONGED QT INTERVAL  BASELINE WANDER IN LEAD(S) V4  Compared to ECG 08/10/2020 22:54:34  Left-axis deviation now present  Atrial flutter no longer present  Electronically Signed On  0:55:26 PDT by Kristine Wong          COURSE & MEDICAL DECISION MAKING  Pertinent Labs & Imaging studies reviewed. (See chart for details)    Chads 2  vasc: 4    Patient resents with palpitations.  Heart rate on his watch markedly elevated.  No evidence of presyncope or syncope with this event.  He has no ischemic chest pain type symptoms.  EKG demonstrates atrial fibrillation/flutter.  He has no prior history of this.  No evidence of infectious etiology.  He does have a complicated heart history with reduced ejection fraction, however this does not appear to be a ventricular tachycardia.    Patient asymptomatic at initial presentation, sinus rhythm on the monitor.    11:49 PM   Case discussed with Dr. Stallworth (cardiology) who suggests discharged home with outpatient follow-up as long as the patient is in sinus rhythm at this time.  Given the patient's marked hypertension, he is unlikely taking his cardiac medications.    Patient has normal potassium, normal magnesium.  He remains in sinus rhythm in the emergency department.  Patient is adamant that he took his evening medications including his metoprolol tonight.  He continues to be in sinus rhythm.    At the time of repeat EKG, patient had an episode of paroxysmal atrial fibrillation, as soon as I walked into the room this was back in normal sinus rhythm.  Patient treated with additional doses of metoprolol with good effect.  Patient counseled on the risk of stroke, he is amenable to Xarelto given his elevated chads 2 vascular score.  He was counseled on return precautions, anticipatory guidance of this.    Last EKG demonstrates similar morphology and ST segment changes compared to his prior EKG from last month.    Delay in obtaining hepatic panel from lab for Xarelto.  Dr. Jackson will follow this up with plan to discharge home on Xarelto if grossly normal.    Appropriate PPE were worn at this encounter.     FINAL IMPRESSION  1. Paroxysmal atrial fibrillation (HCC)             DISPOSITION:  Patient will be discharged home in stable condition.    FOLLOW UP:  Song Camarillo M.D.  [address] [facility]  [address]  [place] [address]  [phone]    Schedule an appointment as soon as possible for a visit       [facility], Emergency Dept  [address]  [place] [id number] [phone]    If symptoms worsen    Jose Stallworth M.D.  [address] [facility] [address]  [place] [id number] [phone]    Schedule an appointment as soon as possible for a visit         OUTPATIENT MEDICATIONS:  New Prescriptions    RIVAROXABAN (XARELTO) 20 MG TAB TABLET    Take 1 Tab by mouth with dinner.

## 2020-08-11 NOTE — ED TRIAGE NOTES
Chief Complaint   Patient presents with   • Irregular Heart Beat     Heart rate 179 per smart device, denies pain

## 2020-08-11 NOTE — DISCHARGE INSTRUCTIONS
You were seen in the emergency department for atrial fibrillation.  Your labs and physical exam were reassuring.  You converted to normal sinus rhythm in the emergency department without intervention.  Please follow-up with your cardiologist, Dr Stallworth.  Please continue taking her medications as prescribed.  These medications can help prevent this from recurring.    You are being started on a blood thinner medication help prevent stroke.  Please take this with a meal.  Please follow-up with your cardiologist.  If you suffer any trauma to the head, black tarry stools, bloody stools, or other concerning findings, please seek medical attention.    Please return to the emergency department or seek medical attention if you develop:  Difficulty breathing, chest pain, lightheadedness, rapid heart rate, any other new or concerning findings    ================================  Coronavirus Information    Your visit did NOT appear to relate to coronavirus, but if you or your family develop symptoms that concern you for coronavirus (please see CDC website for symptoms), please contact the [facility] hotline (or your local health department)  or your healthcare provider before going to a medical facility:    [facility]  24hr hotline: 790.610.6144    Information is available from the Centers for Disease Control and Prevention  www.CDC.gov    and     [facility]  [email or web address]    If you are severely ill or having a hard time breathing, please immediatly seek medical care. Notify the  or Emergency Department Triage about your symptoms.

## 2020-08-11 NOTE — ED NOTES
Patient changed into gown and placed on monitor. IV started and labs drawn. EKG completed. Updated patient on plan of care, verbalized understanding. Patient wearing mask on arrival

## 2020-08-19 ENCOUNTER — TELEPHONE (OUTPATIENT)
Dept: VASCULAR LAB | Facility: MEDICAL CENTER | Age: 35
End: 2020-08-19

## 2020-08-19 NOTE — TELEPHONE ENCOUNTER
Received pharmacotherapy referral for CHF from Dr. Stallworth on 08/06/20    Rescheduled appt for pt.     Insurance: [employer]  PCP: Song Camarillo M.D.  Locations to be seen: [facility] at [phone], fax [phone]    Chloe Lemos, PharmD

## 2020-09-01 ENCOUNTER — TELEPHONE (OUTPATIENT)
Dept: VASCULAR LAB | Facility: MEDICAL CENTER | Age: 35
End: 2020-09-01

## 2020-09-01 DIAGNOSIS — I25.5 ISCHEMIC CARDIOMYOPATHY: ICD-10-CM

## 2020-09-01 DIAGNOSIS — I24.9 ACS (ACUTE CORONARY SYNDROME) (HCC): ICD-10-CM

## 2020-09-01 RX ORDER — ALLOPURINOL 100 MG/1
100 TABLET ORAL DAILY
Qty: 30 TAB | Refills: 5 | Status: SHIPPED | OUTPATIENT
Start: 2020-09-01 | End: 2020-09-25 | Stop reason: SDUPTHER

## 2020-09-01 RX ORDER — ATORVASTATIN CALCIUM 40 MG/1
40 TABLET, FILM COATED ORAL EVERY EVENING
Qty: 30 TAB | Refills: 5 | Status: SHIPPED | OUTPATIENT
Start: 2020-09-01 | End: 2020-11-12 | Stop reason: SDUPTHER

## 2020-09-01 RX ORDER — LOSARTAN POTASSIUM 100 MG/1
100 TABLET ORAL DAILY
Qty: 30 TAB | Refills: 5 | Status: SHIPPED | OUTPATIENT
Start: 2020-09-01 | End: 2020-11-12 | Stop reason: SDUPTHER

## 2020-09-01 RX ORDER — SPIRONOLACTONE 25 MG/1
25 TABLET ORAL DAILY
Qty: 30 TAB | Refills: 5 | Status: SHIPPED | OUTPATIENT
Start: 2020-09-01 | End: 2020-11-12 | Stop reason: SDUPTHER

## 2020-09-01 RX ORDER — CLOPIDOGREL BISULFATE 75 MG/1
75 TABLET ORAL DAILY
Qty: 30 TAB | Refills: 5 | Status: SHIPPED | OUTPATIENT
Start: 2020-09-01 | End: 2020-11-12 | Stop reason: SDUPTHER

## 2020-09-01 NOTE — TELEPHONE ENCOUNTER
----- Message from Mina Burch sent at 9/1/2020 11:42 AM PDT -----  Regarding: RE: Prescription Question  Contact: [phone]  Need refills on my medication   Atorvastatin 40mg  Losartan 100mg  Clopidogrel 75mg  Spironolactone 25mg  Allopurinol 100mg    ----- Message -----  From: Chikis Shepherd, Med Ass't  Sent: 8/31/20, 2:12 PM  To: Mina Burch  Subject: RE: Prescription Question    Mohit Enriquez, what medications do you need filled?      ----- Message -----       From:Mina Burch       Sent:8/31/2020 12:51 PM PDT         To:Song Camarillo M.D.    Subject:Prescription Question    I'm unable to request refills on my medication through the [facility]. Pls help

## 2020-09-01 NOTE — TELEPHONE ENCOUNTER
Received request via: Patient    Was the patient seen in the last year in this department? Yes  7/28/20  Does the patient have an active prescription (recently filled or refills available) for medication(s) requested? No

## 2020-09-08 ENCOUNTER — OFFICE VISIT (OUTPATIENT)
Dept: CARDIOLOGY | Facility: MEDICAL CENTER | Age: 35
End: 2020-09-08
Payer: COMMERCIAL

## 2020-09-08 VITALS
HEART RATE: 86 BPM | DIASTOLIC BLOOD PRESSURE: 134 MMHG | WEIGHT: 202 LBS | HEIGHT: 64 IN | BODY MASS INDEX: 34.49 KG/M2 | SYSTOLIC BLOOD PRESSURE: 194 MMHG

## 2020-09-08 DIAGNOSIS — I50.20 ACC/AHA STAGE C SYSTOLIC HEART FAILURE (HCC): ICD-10-CM

## 2020-09-08 PROCEDURE — 99402 PREV MED CNSL INDIV APPRX 30: CPT | Performed by: INTERNAL MEDICINE

## 2020-09-08 RX ORDER — ASPIRIN 81 MG/1
81 TABLET ORAL DAILY
Qty: 90 TAB | Refills: 1 | Status: SHIPPED | OUTPATIENT
Start: 2020-09-08 | End: 2020-11-12 | Stop reason: SDUPTHER

## 2020-09-08 ASSESSMENT — FIBROSIS 4 INDEX: FIB4 SCORE: 0.43

## 2020-09-08 NOTE — PROGRESS NOTES
CHF Pharmacotherapy visit - Visit  Date of Service: 20  Informed written consent was given on:     Time in: 9:21  Time out: 9:58    Mina Burch is here for CHF and      HPI  Pertinent Interval History since last visit:   none  Most recent EF:  20%  Changes to laboratory values since last visit:  None ordered  Current CHF Medications - including dose:   Entresto or ACE/ARB:losartan 100mg po daily, pt ran out and just restarted yesterday  Beta blocker: metoprolol XL 100mg po daily  Diuretic:spironolactone 25mg po daily  Aldosterone antagonist: none    Changes to weight since last visit: First visit  Home BP:  Pt does not yet have one    Current Adherence to CHF Therapies:  Partial    SOCIAL HISTORY  Social History     Tobacco Use   Smoking Status Former Smoker   • Packs/day: 0.50   • Years: 14.00   • Pack years: 7.00   • Types: Cigarettes   • Quit date: 2020   • Years since quittin.1   Smokeless Tobacco Former User   • Types: Chew   • Quit date: 2018      Change in weight: Stable  Exercise habits: moderate regular exercise program pt walks all day Eloisa as a   Diet: low sodium      DATA REVIEW  Most Recent CMP Panel:   Lab Results   Component Value Date    SODIUM 138 08/10/2020    POTASSIUM 4.0 08/10/2020    CHLORIDE 99 08/10/2020    CO2 24 08/10/2020    ANION 15.0 08/10/2020    GLUCOSE 138 (H) 08/10/2020    BUN 20 08/10/2020    CREATININE 1.30 08/10/2020    CALCIUM 9.6 08/10/2020    ASTSGOT 19 08/10/2020    ALTSGPT 24 08/10/2020    ALKPHOSPHAT 50 08/10/2020    TBILIRUBIN 0.4 08/10/2020    ALBUMIN 4.3 08/10/2020    AGRATIO 1.6 2020    TSHULTRASEN 1.130 2018       Renal function:  >60ml/min  Other Pertinent Blood Work:     Other:      Recent Imaging Studies:    None since last visit    ASSESSMENT AND PLAN  Changes to medications:    Lifestyle Recommendations From Today's Visit:   Eating Plan: Concentrate on  Low simple carb  Weight Management: continue to walk and  lose weight  Recommendations for Other Cardiovascular Risk Factors, hazel all that apply:   Diabetes/Impaired Fasting Glucose- will request dm referral    Resulting CHF medications today (changes are bolded)  Entresto or ACE/ARB: losartan 100mg po daily  Beta blocker: toprol XL 150mg po daily  Diuretic:spironolactone 25mg po daily  Aldosterone antagonist: none    Studies Ordered at Todays Visit:  None   Blood Work Ordered At Today's visit:   None  Follow-Up:   1 weeks    Jessica Bell    CC:  Song Camarillo M.D.  No ref. provider found    Medications reconciled  Flow sheets updated

## 2020-09-08 NOTE — NON-PROVIDER
CHF Pharmacotherapy visit - Visit  Date of Service: 20  Informed written consent was given on:     Time in: 9:21  Time out: 9:58    Mina Burch is here for CHF and      HPI  Pertinent Interval History since last visit:   none  Most recent EF:  20%  Changes to laboratory values since last visit:  None ordered  Current CHF Medications - including dose:   Entresto or ACE/ARB:losartan 100mg po daily, pt ran out and just restarted yesterday  Beta blocker: metoprolol XL 100mg po daily  Diuretic:spironolactone 25mg po daily  Aldosterone antagonist: none    Changes to weight since last visit: First visit  Home BP:  Pt does not yet have one    Current Adherence to CHF Therapies:  Partial    SOCIAL HISTORY  Social History     Tobacco Use   Smoking Status Former Smoker   • Packs/day: 0.50   • Years: 14.00   • Pack years: 7.00   • Types: Cigarettes   • Quit date: 2020   • Years since quittin.1   Smokeless Tobacco Former User   • Types: Chew   • Quit date: 2018      Change in weight: Stable  Exercise habits: moderate regular exercise program pt walks all day Eloisa as a   Diet: low sodium      DATA REVIEW  Most Recent CMP Panel:   Lab Results   Component Value Date    SODIUM 138 08/10/2020    POTASSIUM 4.0 08/10/2020    CHLORIDE 99 08/10/2020    CO2 24 08/10/2020    ANION 15.0 08/10/2020    GLUCOSE 138 (H) 08/10/2020    BUN 20 08/10/2020    CREATININE 1.30 08/10/2020    CALCIUM 9.6 08/10/2020    ASTSGOT 19 08/10/2020    ALTSGPT 24 08/10/2020    ALKPHOSPHAT 50 08/10/2020    TBILIRUBIN 0.4 08/10/2020    ALBUMIN 4.3 08/10/2020    AGRATIO 1.6 2020    TSHULTRASEN 1.130 2018       Renal function:  >60ml/min  Other Pertinent Blood Work:     Other:      Recent Imaging Studies:    None since last visit    ASSESSMENT AND PLAN  Changes to medications:    Lifestyle Recommendations From Today's Visit:   Eating Plan: Concentrate on  Low simple carb  Weight Management: continue to walk and  lose weight  Recommendations for Other Cardiovascular Risk Factors, hazel all that apply:   Diabetes/Impaired Fasting Glucose- will request dm referral    Resulting CHF medications today (changes are bolded)  Entresto or ACE/ARB: losartan 100mg po daily  Beta blocker: toprol XL 150mg po daily  Diuretic:spironolactone 25mg po daily  Aldosterone antagonist: none    Studies Ordered at Todays Visit:  None   Blood Work Ordered At Today's visit:   None  Follow-Up:   1 weeks    Jessica Bell    CC:  Song Camarillo M.D.  No ref. provider found    xMedications reconciled  xFlow sheets updated

## 2020-09-09 DIAGNOSIS — E11.29 TYPE 2 DIABETES MELLITUS WITH OTHER DIABETIC KIDNEY COMPLICATION, WITHOUT LONG-TERM CURRENT USE OF INSULIN (HCC): ICD-10-CM

## 2020-09-22 ENCOUNTER — TELEPHONE (OUTPATIENT)
Dept: VASCULAR LAB | Facility: MEDICAL CENTER | Age: 35
End: 2020-09-22

## 2020-09-22 NOTE — TELEPHONE ENCOUNTER
Pt no showed his last CHF visit.  Pt to reschedule  Jessica Filter, Clinical Pharmacist, CDE, CACP

## 2020-09-25 ENCOUNTER — OFFICE VISIT (OUTPATIENT)
Dept: MEDICAL GROUP | Facility: LAB | Age: 35
End: 2020-09-25
Payer: COMMERCIAL

## 2020-09-25 ENCOUNTER — HOSPITAL ENCOUNTER (OUTPATIENT)
Dept: LAB | Facility: MEDICAL CENTER | Age: 35
End: 2020-09-25
Attending: INTERNAL MEDICINE
Payer: COMMERCIAL

## 2020-09-25 VITALS
DIASTOLIC BLOOD PRESSURE: 92 MMHG | WEIGHT: 201.2 LBS | HEIGHT: 64 IN | SYSTOLIC BLOOD PRESSURE: 160 MMHG | RESPIRATION RATE: 12 BRPM | BODY MASS INDEX: 34.35 KG/M2 | TEMPERATURE: 96.5 F | HEART RATE: 66 BPM | OXYGEN SATURATION: 96 %

## 2020-09-25 DIAGNOSIS — M10.9 GOUT OF MULTIPLE SITES, UNSPECIFIED CAUSE, UNSPECIFIED CHRONICITY: ICD-10-CM

## 2020-09-25 DIAGNOSIS — E55.9 VITAMIN D DEFICIENCY: ICD-10-CM

## 2020-09-25 DIAGNOSIS — I25.119 CORONARY ARTERY DISEASE INVOLVING NATIVE CORONARY ARTERY OF NATIVE HEART WITH ANGINA PECTORIS (HCC): ICD-10-CM

## 2020-09-25 PROCEDURE — 99214 OFFICE O/P EST MOD 30 MIN: CPT | Performed by: INTERNAL MEDICINE

## 2020-09-25 RX ORDER — ALLOPURINOL 100 MG/1
100 TABLET ORAL DAILY
Qty: 30 TAB | Refills: 5 | Status: SHIPPED | OUTPATIENT
Start: 2020-09-25 | End: 2020-11-12 | Stop reason: SDUPTHER

## 2020-09-25 RX ORDER — COLCHICINE 0.6 MG/1
0.6 TABLET ORAL DAILY
Qty: 30 TAB | Refills: 0 | Status: SHIPPED | OUTPATIENT
Start: 2020-09-25 | End: 2020-11-12 | Stop reason: SDUPTHER

## 2020-09-25 ASSESSMENT — FIBROSIS 4 INDEX: FIB4 SCORE: 0.43

## 2020-09-25 NOTE — PROGRESS NOTES
CC: Mina Burch is a 35 y.o. male is suffering from   Chief Complaint   Patient presents with   • Gout     pt has current flare up in right foot         SUBJECTIVE:  1. Vitamin D deficiency  Mina is here for follow-up has a history of vitamin D deficiency recheck levels    2. Coronary artery disease involving native coronary artery of native heart with angina pectoris (HCC)  Patient with myocardial infarctions x3 most recently 2020.  Patient with a previous episode 2019 also in [facility].  Patient and I have discussed that his gout significantly increases his risk of having problems with atherosclerosis, I am concerned about April be 100 or LP(a) deformity, NMR cholesterol ordered.  Discussed that he needs to stay on statin drug therapy is not to use any indomethacin as he is also on Plavix, Xarelto, aspirin.    3. Gout of multiple sites, unspecified cause, unspecified chronicity  History of gout right foot, patient has been unable to afford allopurinol, prescription written for colchicine to be used short-term only.        Past social, family, history:   Social History     Tobacco Use   • Smoking status: Former Smoker     Packs/day: 0.50     Years: 14.00     Pack years: 7.00     Types: Cigarettes     Quit date: 2020     Years since quittin.1   • Smokeless tobacco: Former User     Types: Chew     Quit date: 2018   Substance Use Topics   • Alcohol use: Yes     Comment: occasionally    • Drug use: Yes     Types: Marijuana         MEDICATIONS:    Current Outpatient Medications:   •  colchicine (COLCRYS) 0.6 MG Tab, Take 1 Tab by mouth every day. For gout flair 1.2mg initially may repeat in 1 hour.  Max dosage is 1.8 mg in 24 hours do not retreat for 3 days., Disp: 30 Tab, Rfl: 0  •  metFORMIN (GLUCOPHAGE) 500 MG Tab, Take 500 mg by mouth 2 times a day, with meals., Disp: , Rfl:   •  aspirin 81 MG EC tablet, Take 1 Tab by mouth every day., Disp: 90 Tab, Rfl: 1  •   spironolactone (ALDACTONE) 25 MG Tab, Take 1 Tab by mouth every day., Disp: 30 Tab, Rfl: 5  •  clopidogrel (PLAVIX) 75 MG Tab, Take 1 Tab by mouth every day., Disp: 30 Tab, Rfl: 5  •  losartan (COZAAR) 100 MG Tab, Take 1 Tab by mouth every day., Disp: 30 Tab, Rfl: 5  •  atorvastatin (LIPITOR) 40 MG Tab, Take 1 Tab by mouth every evening., Disp: 30 Tab, Rfl: 5  •  rivaroxaban (XARELTO) 20 MG Tab tablet, Take 1 Tab by mouth with dinner., Disp: 30 Tab, Rfl: 0  •  metoprolol SR (TOPROL XL) 100 MG TABLET SR 24 HR, Take 1 Tab by mouth 2 Times a Day., Disp: 30 Tab, Rfl: 0  •  isosorbide dinitrate (ISORDIL) 10 MG Tab, Take 0.5 Tabs by mouth 2 times a day as needed (chest pain)., Disp: 15 Tab, Rfl: 0  •  allopurinol (ZYLOPRIM) 100 MG Tab, Take 1 Tab by mouth every day. (Patient not taking: Reported on 9/8/2020), Disp: 30 Tab, Rfl: 5  •  indomethacin SR (INDOCIN SR) 75 MG Cap CR, TAKE 1 CAPSULE BY MOUTH TWICE DAILY WITH MEALS AS NEEDED (Patient not taking: Reported on 9/25/2020), Disp: 20 Cap, Rfl: 0    PROBLEMS:  Patient Active Problem List    Diagnosis Date Noted   • Ischemic cardiomyopathy 11/02/2018     Priority: High   • Coronary artery disease involving native coronary artery of native heart with angina pectoris ([facility]) 11/01/2018     Priority: High   • Asthma 07/22/2020     Priority: Medium   • Leukocytosis 07/22/2020     Priority: Medium   • ARF (acute renal failure) ([facility]) 07/22/2020     Priority: Medium   • Tobacco abuse 12/08/2018     Priority: Low   • Gout of multiple sites 10/28/2019   • Obesity (BMI 35.0-39.9 without comorbidity) ([facility]) 02/04/2019   • Type 2 diabetes mellitus with hyperglycemia ([facility]) 12/08/2018   • Essential hypertension 11/01/2018   • Type 2 diabetes mellitus with diabetic neuropathy, without long-term current use of insulin ([facility]) 11/01/2018   • STEMI (ST elevation myocardial infarction) ([facility]) 11/01/2018       REVIEW OF SYSTEMS:  Gen.:  No Nausea, Vomiting, fever, Chills.  Heart: No chest  "pain.  Lungs:  No shortness of Breath.  Psychological: Dontrell unusual Anxiety depression     PHYSICAL EXAM   Constitutional: Alert, cooperative, not in acute distress.  Cardiovascular:  Rate Rhythm is regular without murmurs rubs clicks.     Thorax & Lungs: Clear to auscultation, no wheezing, rhonchi, or rales  HENT: Normocephalic, Atraumatic.  Eyes: PERRLA, EOMI, Conjunctiva normal.   Neck: Trachia is midline no swelling of the thyroid.   Lymphatic: No lymphadenopathy noted.   Musculoskeletal: Pain to palpation right dorsum foot right ankle consistent with probable gout  Neurologic: Alert & oriented x 3, cranial nerves II through XII are intact, Normal motor function, Normal sensory function, No focal deficits noted.   Psychiatric: Affect normal, Judgment normal, Mood normal.     VITAL SIGNS:/92   Pulse 66   Temp 35.8 °C (96.5 °F) (Temporal)   Resp 12   Ht 1.626 m (5' 4\")   Wt 91.3 kg (201 lb 3.2 oz)   SpO2 96%   BMI 34.54 kg/m²     Labs: Reviewed    Assessment:                                                     Plan:    1. Vitamin D deficiency  Recheck vitamin D  - VITAMIN D,25 HYDROXY; Future  - URIC ACID; Future  - Sed Rate; Future  - CRP QUANTITIVE (NON-CARDIAC); Future    2. Coronary artery disease involving native coronary artery of native heart with angina pectoris (HCC)  NMR cholesterol ordered concerning for possible problems with APO B 100 LP(a) continued follow-up by cardiology  - LipoFit by NMR; Future  - URIC ACID; Future  - Sed Rate; Future  - CRP QUANTITIVE (NON-CARDIAC); Future    3. Gout of multiple sites, unspecified cause, unspecified chronicity  Colchicine to be used short-term only patient is to start allopurinol.  - colchicine (COLCRYS) 0.6 MG Tab; Take 1 Tab by mouth every day. For gout flair 1.2mg initially may repeat in 1 hour.  Max dosage is 1.8 mg in 24 hours do not retreat for 3 days.  Dispense: 30 Tab; Refill: 0  - URIC ACID; Future  - Sed Rate; Future  - CRP QUANTITIVE " (NON-CARDIAC); Future

## 2020-09-25 NOTE — LETTER
September 25, 2020        Patient: Mina Burch   YOB: 1985   Date of Visit: 9/25/2020           To Whom It May Concern:    It is my opinion that Mina Burch is suffering from a medical condition and needs to be off work September 24, 2020 through September 30, 2020.     If you have any questions or concerns, please don't hesitate to call.        Sincerely,          Kenroy Romero D.O.  Board Certified General Internal Medicine.      [address] [id number] [phone] (Phone)  [phone] (Fax)

## 2020-10-02 ENCOUNTER — HOSPITAL ENCOUNTER (OUTPATIENT)
Dept: LAB | Facility: MEDICAL CENTER | Age: 35
End: 2020-10-02
Attending: INTERNAL MEDICINE
Payer: COMMERCIAL

## 2020-10-02 ENCOUNTER — TELEPHONE (OUTPATIENT)
Dept: VASCULAR LAB | Facility: MEDICAL CENTER | Age: 35
End: 2020-10-02

## 2020-10-02 DIAGNOSIS — E55.9 VITAMIN D DEFICIENCY: ICD-10-CM

## 2020-10-02 DIAGNOSIS — M10.9 GOUT OF MULTIPLE SITES, UNSPECIFIED CAUSE, UNSPECIFIED CHRONICITY: ICD-10-CM

## 2020-10-02 DIAGNOSIS — I25.119 CORONARY ARTERY DISEASE INVOLVING NATIVE CORONARY ARTERY OF NATIVE HEART WITH ANGINA PECTORIS (HCC): ICD-10-CM

## 2020-10-02 LAB
CRP SERPL HS-MCNC: 1.46 MG/DL (ref 0–0.75)
ERYTHROCYTE [SEDIMENTATION RATE] IN BLOOD BY WESTERGREN METHOD: 24 MM/HOUR (ref 0–15)
URATE SERPL-MCNC: 10.9 MG/DL (ref 2.5–8.3)

## 2020-10-02 PROCEDURE — 85652 RBC SED RATE AUTOMATED: CPT

## 2020-10-02 PROCEDURE — 82306 VITAMIN D 25 HYDROXY: CPT

## 2020-10-02 PROCEDURE — 36415 COLL VENOUS BLD VENIPUNCTURE: CPT

## 2020-10-02 PROCEDURE — 84550 ASSAY OF BLOOD/URIC ACID: CPT

## 2020-10-02 PROCEDURE — 86140 C-REACTIVE PROTEIN: CPT

## 2020-10-03 LAB — 25(OH)D3 SERPL-MCNC: 32 NG/ML (ref 30–100)

## 2020-10-06 ENCOUNTER — HOSPITAL ENCOUNTER (OUTPATIENT)
Dept: LAB | Facility: MEDICAL CENTER | Age: 35
End: 2020-10-06
Attending: FAMILY MEDICINE
Payer: COMMERCIAL

## 2020-10-06 ENCOUNTER — OFFICE VISIT (OUTPATIENT)
Dept: MEDICAL GROUP | Facility: LAB | Age: 35
End: 2020-10-06
Payer: COMMERCIAL

## 2020-10-06 ENCOUNTER — NON-PROVIDER VISIT (OUTPATIENT)
Dept: CARDIOLOGY | Facility: MEDICAL CENTER | Age: 35
End: 2020-10-06
Payer: COMMERCIAL

## 2020-10-06 ENCOUNTER — ANTICOAGULATION MONITORING (OUTPATIENT)
Dept: CARDIOLOGY | Facility: MEDICAL CENTER | Age: 35
End: 2020-10-06

## 2020-10-06 VITALS
DIASTOLIC BLOOD PRESSURE: 108 MMHG | BODY MASS INDEX: 33.97 KG/M2 | HEART RATE: 66 BPM | SYSTOLIC BLOOD PRESSURE: 158 MMHG | TEMPERATURE: 97.2 F | HEIGHT: 64 IN | WEIGHT: 199 LBS | RESPIRATION RATE: 12 BRPM | OXYGEN SATURATION: 98 %

## 2020-10-06 VITALS
DIASTOLIC BLOOD PRESSURE: 125 MMHG | BODY MASS INDEX: 34.83 KG/M2 | HEIGHT: 64 IN | HEART RATE: 68 BPM | WEIGHT: 204 LBS | SYSTOLIC BLOOD PRESSURE: 162 MMHG

## 2020-10-06 DIAGNOSIS — I25.119 CORONARY ARTERY DISEASE INVOLVING NATIVE CORONARY ARTERY OF NATIVE HEART WITH ANGINA PECTORIS (HCC): ICD-10-CM

## 2020-10-06 DIAGNOSIS — M10.9 GOUT OF MULTIPLE SITES, UNSPECIFIED CAUSE, UNSPECIFIED CHRONICITY: ICD-10-CM

## 2020-10-06 DIAGNOSIS — I10 ESSENTIAL HYPERTENSION: ICD-10-CM

## 2020-10-06 DIAGNOSIS — E11.40 TYPE 2 DIABETES MELLITUS WITH DIABETIC NEUROPATHY, WITHOUT LONG-TERM CURRENT USE OF INSULIN (HCC): ICD-10-CM

## 2020-10-06 LAB
ANION GAP SERPL CALC-SCNC: 11 MMOL/L (ref 7–16)
BUN SERPL-MCNC: 16 MG/DL (ref 8–22)
CALCIUM SERPL-MCNC: 8.9 MG/DL (ref 8.5–10.5)
CHLORIDE SERPL-SCNC: 104 MMOL/L (ref 96–112)
CO2 SERPL-SCNC: 23 MMOL/L (ref 20–33)
CREAT SERPL-MCNC: 1.17 MG/DL (ref 0.5–1.4)
EST. AVERAGE GLUCOSE BLD GHB EST-MCNC: 143 MG/DL
GLUCOSE SERPL-MCNC: 127 MG/DL (ref 65–99)
HBA1C MFR BLD: 6.6 % (ref 0–5.6)
POTASSIUM SERPL-SCNC: 4.5 MMOL/L (ref 3.6–5.5)
SODIUM SERPL-SCNC: 138 MMOL/L (ref 135–145)

## 2020-10-06 PROCEDURE — 36415 COLL VENOUS BLD VENIPUNCTURE: CPT

## 2020-10-06 PROCEDURE — 99402 PREV MED CNSL INDIV APPRX 30: CPT | Performed by: INTERNAL MEDICINE

## 2020-10-06 PROCEDURE — 83036 HEMOGLOBIN GLYCOSYLATED A1C: CPT

## 2020-10-06 PROCEDURE — 99214 OFFICE O/P EST MOD 30 MIN: CPT | Performed by: FAMILY MEDICINE

## 2020-10-06 PROCEDURE — 80048 BASIC METABOLIC PNL TOTAL CA: CPT

## 2020-10-06 RX ORDER — HYDROCHLOROTHIAZIDE 25 MG/1
25 TABLET ORAL DAILY
Qty: 30 TAB | Refills: 1 | Status: SHIPPED | OUTPATIENT
Start: 2020-10-06 | End: 2020-11-12 | Stop reason: SDUPTHER

## 2020-10-06 ASSESSMENT — FIBROSIS 4 INDEX
FIB4 SCORE: 0.43

## 2020-10-06 NOTE — PATIENT INSTRUCTIONS
Increase losartan to 150mg daily (1 1/2 tablets)  Continue toprol XL 150mg po daily  Continue spriorolactone 25mg daily    Call Jessica [phone] for any weird side effects    Trulicity 0.75mg sub q weekly on TUESDAYS  Start Jardiance 10mg daily in the MORNING tomorrow    Ask PCP for intermittant FMLA to protect your job pending any more gout flare ups

## 2020-10-06 NOTE — PROGRESS NOTES
"Subjective:     CC: Gout    HPI:   Mina a 35-year-old male with a complex medical history including type 2 diabetes, coronary artery disease, hypertension and gout who presents to discuss:    Gout  Chronic issue, he had a recent flare that has nearly resolved but still with some pain to the dorsal surface of his left foot.  He would like to get his indomethacin refilled.  He was given colchicine by another provider as he can no longer take indomethacin with Plavix and Xarelto.  He did start allopurinol.    Hypertension  He reports that he is taking all medicines as prescribed.  He is currently on losartan 100 mg daily, metoprolol  mg twice daily, and spironolactone 25 mg daily.  Reports he does have follow-up with cardiology later this month.    BP today 150s to 160s/100s - 110s. Was similar at anticoagulation clinic this morning.    No chest pain, no shortness of breath, no headaches.  No cough, no congestion, no fevers or chills.    Medications, past medical history, allergies, and social history have been reviewed and updated.    ROS:  See HPI    Objective:       Exam:  /108 (BP Location: Right arm, Patient Position: Sitting, BP Cuff Size: Adult)   Pulse 66   Temp 36.2 °C (97.2 °F)   Resp 12   Ht 1.626 m (5' 4\")   Wt 90.3 kg (199 lb)   SpO2 98%   BMI 34.16 kg/m²  Body mass index is 34.16 kg/m².    Constitutional: Alert. Well appearing. No distress.  Skin: Warm, dry, good turgor, no visible rashes.  Eye: Equal, round and reactive to light, conjunctiva clear, lids normal.  ENMT: Moist mucous membranes. Normal dentition.  Respiratory: Normal effort. Lungs are clear to auscultation bilaterally.  Cardiovascular: Regular rate and rhythm. Normal S1/S2. No murmurs, rubs or gallops.   Neuro: Moves all four extremities. No facial droop.  MSK: There is mild tenderness to the dorsal left midfoot with no appreciable erythema, edema or warmth.  Psych: Answers questions appropriately. Normal affect and " mood.      Assessment & Plan:     35 y.o. male with the following -     1. Gout of multiple sites, unspecified cause, unspecified chronicity  Chronic issue. He had recent flare that did partially respond to colchicine, recently started allopurinol. Discussed that he could repeat colchicine course, can no longer use NSAIDs due to plavix and xarelto. Will likely need to continue to titrate up allopurinol dose as Uric acid prior to starting was 10.9. Would like to avoid steroid course if possible due to uncontrolled diabetes and hypertension.    2. Essential hypertension  Blood pressure significantly elevated today, and was at prior visit as well. He reports that he is taking all medicines as prescribed.  He is currently on losartan 100 mg daily, metoprolol  mg twice daily, and spironolactone 25 mg daily.  We will start hydrochlorothiazide and check labs.  Close follow-up is scheduled.  Discussed importance of adherence to medications and visits.  - hydroCHLOROthiazide (HYDRODIURIL) 25 MG Tab; Take 1 Tab by mouth every day.  Dispense: 30 Tab; Refill: 1  - Basic Metabolic Panel; Future    3. Type 2 diabetes mellitus with diabetic neuropathy, without long-term current use of insulin (HCC)  He is on metformin but was also given some samples of Trulicity and Jardiance from clinical pharmacist.  We will check A1c discussed diabetes next visit.  - HEMOGLOBIN A1C; Future    4. Coronary artery disease involving native coronary artery of native heart with angina pectoris (HCC)  Chronic issue. History of MI x 3, most recently July 2020. Discussed importance of follow up with cardiology. Continues on Plavix, xarelto, statin.    Please note that this note was created using voice recognition software.

## 2020-10-06 NOTE — NON-PROVIDER
CHF Pharmacotherapy visit - Visit  Date of Service: 10/06/20  Informed written consent was given on: 2020    Time in: 7:58  Time out: 8:33    Mina Burch is here for CHF and Diabetes      HPI  Pertinent Interval History since last visit:   none  Most recent EF:  20%  Changes to laboratory values since last visit:  n/a  Current CHF Medications - including dose:   Entresto or ACE/ARB:losartan 100mg po daily  Beta blocker: toprol Xl 150mg po daily(initial dose Caredilol 3.125 mg BID; Metoprolol Suc 25 mg BID)  Diuretic:none  Aldosterone antagonist: spironolactone 25mg po daily    Changes to weight since last visit:   stable  Home BP:  Pt does not check BP at home    Current Adherence to CHF Therapies:  Complete    SOCIAL HISTORY  Social History     Tobacco Use   Smoking Status Former Smoker   • Packs/day: 0.50   • Years: 14.00   • Pack years: 7.00   • Types: Cigarettes   • Quit date: 2020   • Years since quittin.2   Smokeless Tobacco Former User   • Types: Chew   • Quit date: 2018      Change in weight: Stable  Exercise habits: no regular exercise program pt has been unable to walk for the past couple of weeks secondary to a severe gout flare up  Diet: common adult      DATA REVIEW  Most Recent CMP Panel:   Lab Results   Component Value Date    SODIUM 138 08/10/2020    POTASSIUM 4.0 08/10/2020    CHLORIDE 99 08/10/2020    CO2 24 08/10/2020    ANION 15.0 08/10/2020    GLUCOSE 138 (H) 08/10/2020    BUN 20 08/10/2020    CREATININE 1.30 08/10/2020    CALCIUM 9.6 08/10/2020    ASTSGOT 19 08/10/2020    ALTSGPT 24 08/10/2020    ALKPHOSPHAT 50 08/10/2020    TBILIRUBIN 0.4 08/10/2020    ALBUMIN 4.3 08/10/2020    AGRATIO 1.6 2020    CREACTPROT 1.46 (H) 10/02/2020    TSHULTRASEN 1.130 2018       Renal function:  >60ml/min  Other Pertinent Blood Work:     Other:      Recent Imaging Studies:    None since last visit    ASSESSMENT AND PLAN  Changes to medications:    Lifestyle  Recommendations From Today's Visit:   Eating Plan: Concentrate on  Low simple carb  Exercise: pt typically walks >10 miles daily at work  Recommendations for Other Cardiovascular Risk Factors, hazel all that apply:   Diabetes/Impaired Fasting Glucose- will continue to reduce consumption of simple carbohydrates    Resulting CHF medications today (changes are bolded)  Entresto or ACE/ARB: BP still not at goal, will increase to losartan 150mg po daily  Beta blocker: Toprol XL 150mg po daily(initial dose Caredilol 3.125 mg BID; Metoprolol Suc 25 mg BID)  Diuretic:none  Aldosterone antagonist: spironolactone 25mg po daily    PT FSBG not yet at goal.  To reduce 3 point mace, will begin Trulicity 0.75mg sub q weekly on Tuesdays samples given    Will begin Jardiance 10mg po daily, will further optimize as tolerated.  Discussed need for low sodium and low simple carbohydrate diet.  Pt has already lost ~100lbs over the past year or so.  Pt would benefit from additional weight loss.      BP not yet at goal, despite complete adherence.  Will increase losartan to 150mg po daily and continue other meds (at max dose)        Studies Ordered at Todays Visit:  None   Blood Work Ordered At Today's visit:   None  Follow-Up:   2 weeks    Jessica Bell    CC:  Song Camarillo M.D.  No ref. provider found    XMedications reconciled  XFlow sheets updated

## 2020-10-15 NOTE — TELEPHONE ENCOUNTER
Received request via: Pharmacy    Was the patient seen in the last year in this department? Yes  LOV 10/06/2020  Does the patient have an active prescription (recently filled or refills available) for medication(s) requested? No

## 2020-10-29 ENCOUNTER — TELEPHONE (OUTPATIENT)
Dept: VASCULAR LAB | Facility: MEDICAL CENTER | Age: 35
End: 2020-10-29

## 2020-10-29 NOTE — TELEPHONE ENCOUNTER
Phone is out of service  Pt no showed his last CHF/DM visit.  Letter sent.  Jessica Bell, Clinical Pharmacist, CDE, CACP

## 2020-10-29 NOTE — LETTER
[address] [facility] [address]  [address]  [place] [address]        Dear Mina Burch ,    We have been unsuccessful in our attempts to contact you regarding your Heart Failure/Diabetes Clinical Pharmacist referral.  Please contact us if you would like to schedule an appointment for help managing your Heart Failure/Diabetes.    Please contact our clinic so we may assist you.  We are open Monday-Friday 8 am until 5 pm.  You may reach our Service at [phone].        Sincerely,       Segun GomezD, [facility]  Pharmacy Clinical Supervisor  [facility]  Outpatient Ambulatory Care Service       denies loose teeth

## 2020-11-03 ENCOUNTER — TELEPHONE (OUTPATIENT)
Dept: VASCULAR LAB | Facility: MEDICAL CENTER | Age: 35
End: 2020-11-03

## 2020-11-03 NOTE — LETTER
[address] [facility]  [address]  [place] [address]        Dear Mina Burch ,    We have been unsuccessful in our attempts to contact you regarding your Heart Failure Clinical Pharmacist referral.  Please contact us if you would like to schedule an appointment for help managing your Heart Failure.    Please contact our clinic so we may assist you.  We are open Monday-Friday 8 am until 5 pm.  You may reach our Service at [phone].        Sincerely,       Segun Sarmiento PharmD, [facility]  Pharmacy Clinical Supervisor  [facility]  Outpatient Ambulatory Care Service

## 2020-11-03 NOTE — TELEPHONE ENCOUNTER
Pt no showed his last CHF visit with the pharmacist.    Unable  vm message to reschedule, phone is not accepting calls at this time  Letter sent    Jessica Bell, Clinical Pharmacist, CDE, CACP

## 2020-11-10 ENCOUNTER — TELEPHONE (OUTPATIENT)
Dept: VASCULAR LAB | Facility: MEDICAL CENTER | Age: 35
End: 2020-11-10

## 2020-11-12 DIAGNOSIS — M10.9 GOUTY ARTHROPATHY: ICD-10-CM

## 2020-11-12 DIAGNOSIS — M10.9 GOUT OF MULTIPLE SITES, UNSPECIFIED CAUSE, UNSPECIFIED CHRONICITY: ICD-10-CM

## 2020-11-12 DIAGNOSIS — I10 ESSENTIAL HYPERTENSION: ICD-10-CM

## 2020-11-12 DIAGNOSIS — I24.9 ACS (ACUTE CORONARY SYNDROME) (HCC): ICD-10-CM

## 2020-11-12 DIAGNOSIS — I25.5 ISCHEMIC CARDIOMYOPATHY: ICD-10-CM

## 2020-11-12 RX ORDER — SPIRONOLACTONE 25 MG/1
25 TABLET ORAL DAILY
Qty: 30 TAB | Refills: 5 | Status: SHIPPED | OUTPATIENT
Start: 2020-11-12 | End: 2021-12-17

## 2020-11-12 RX ORDER — HYDROCHLOROTHIAZIDE 25 MG/1
25 TABLET ORAL DAILY
Qty: 30 TAB | Refills: 1 | Status: SHIPPED | OUTPATIENT
Start: 2020-11-12 | End: 2020-12-15

## 2020-11-12 RX ORDER — COLCHICINE 0.6 MG/1
0.6 TABLET ORAL DAILY
Qty: 30 TAB | Refills: 1 | Status: SHIPPED | OUTPATIENT
Start: 2020-11-12 | End: 2023-11-15

## 2020-11-12 RX ORDER — INDOMETHACIN 75 MG/1
CAPSULE, EXTENDED RELEASE ORAL
Qty: 20 CAP | Refills: 0 | OUTPATIENT
Start: 2020-11-12

## 2020-11-12 RX ORDER — LOSARTAN POTASSIUM 100 MG/1
100 TABLET ORAL DAILY
Qty: 30 TAB | Refills: 5 | Status: SHIPPED | OUTPATIENT
Start: 2020-11-12 | End: 2023-11-15

## 2020-11-12 RX ORDER — CLOPIDOGREL BISULFATE 75 MG/1
75 TABLET ORAL DAILY
Qty: 30 TAB | Refills: 5 | Status: SHIPPED | OUTPATIENT
Start: 2020-11-12 | End: 2020-12-15 | Stop reason: SDUPTHER

## 2020-11-12 RX ORDER — ALLOPURINOL 100 MG/1
100 TABLET ORAL DAILY
Qty: 30 TAB | Refills: 5 | Status: SHIPPED | OUTPATIENT
Start: 2020-11-12

## 2020-11-12 RX ORDER — ASPIRIN 81 MG/1
81 TABLET ORAL DAILY
Qty: 90 TAB | Refills: 1 | Status: SHIPPED | OUTPATIENT
Start: 2020-11-12 | End: 2023-11-15

## 2020-11-12 RX ORDER — ATORVASTATIN CALCIUM 40 MG/1
40 TABLET, FILM COATED ORAL EVERY EVENING
Qty: 30 TAB | Refills: 5 | Status: SHIPPED | OUTPATIENT
Start: 2020-11-12 | End: 2020-12-15 | Stop reason: SDUPTHER

## 2020-11-12 NOTE — TELEPHONE ENCOUNTER
Pt moved and lost his medication  
89M w/ hx of aflutter on coumadin s/p PPM, DM2, CHF, diabetic neuropathy, hypothyroid, HTN, HLD p/w SOB and edema for several days. Pt states over the past several days he noticed retention of more fluid in his legs and worsening DAILEY. He has also noted a band like chest pressure going across his chest. Symptoms mildly aggravated by exertion. He noticed a weight gain of 5lbs over the past 5 days as well. Went to see his cardiologist for these reasons today and was told he heard fluid at bottom of lungs. Was advised to go to hospital for IV diuretics. Pt endorses hx of some orthostatic DAILEY as well but unclear how new this is. Denies fevers, chills, cough, sick contacts or recent travel. Endorses medication compliance. States his food might have been more salty than usual over past several days. Denies any history of stents.    In ER: Given Lasix 40 IV

## 2020-11-19 ENCOUNTER — TELEPHONE (OUTPATIENT)
Dept: MEDICAL GROUP | Facility: LAB | Age: 35
End: 2020-11-19

## 2020-11-20 NOTE — TELEPHONE ENCOUNTER
MEDICATION PRIOR AUTHORIZATION NEEDED:    1. Name of Medication: colchicine (COLCRYS) 0.6 MG Tab    2. Requested By (Name of Pharmacy): [facility]  P: [phone]  F: [phone]     3. Is insurance on file current? Yes    4. What is the name & phone number of the 3rd party payor?   [employer]   [phone]      Prior auth submitted to plan through CoverMyMeds, waiting for possible approval.     Key: [id number]

## 2020-12-09 NOTE — TELEPHONE ENCOUNTER
Received request via: Pharmacy    Was the patient seen in the last year in this department? Yes  10/6/20  Does the patient have an active prescription (recently filled or refills available) for medication(s) requested? No

## 2020-12-15 ENCOUNTER — NON-PROVIDER VISIT (OUTPATIENT)
Dept: CARDIOLOGY | Facility: MEDICAL CENTER | Age: 35
End: 2020-12-15
Payer: COMMERCIAL

## 2020-12-15 VITALS
HEART RATE: 93 BPM | BODY MASS INDEX: 35.85 KG/M2 | HEIGHT: 64 IN | DIASTOLIC BLOOD PRESSURE: 132 MMHG | WEIGHT: 210 LBS | SYSTOLIC BLOOD PRESSURE: 185 MMHG

## 2020-12-15 DIAGNOSIS — I24.9 ACS (ACUTE CORONARY SYNDROME) (HCC): ICD-10-CM

## 2020-12-15 PROCEDURE — 99211 OFF/OP EST MAY X REQ PHY/QHP: CPT | Performed by: INTERNAL MEDICINE

## 2020-12-15 RX ORDER — ATORVASTATIN CALCIUM 40 MG/1
40 TABLET, FILM COATED ORAL EVERY EVENING
Qty: 90 TAB | Refills: 1 | Status: SHIPPED | OUTPATIENT
Start: 2020-12-15

## 2020-12-15 RX ORDER — CLOPIDOGREL BISULFATE 75 MG/1
75 TABLET ORAL DAILY
Qty: 90 TAB | Refills: 1 | Status: SHIPPED | OUTPATIENT
Start: 2020-12-15 | End: 2023-11-15

## 2020-12-15 ASSESSMENT — FIBROSIS 4 INDEX: FIB4 SCORE: 0.43

## 2020-12-15 NOTE — NON-PROVIDER
CHF Pharmacotherapy visit - Visit  Date of Service: 12/15/20  Informed written consent was given on: 10/06/2020    Mina Burch is here for CHF and DM      HPI  Pertinent Interval History since last visit:   none  Most recent EF:  20%  Changes to laboratory values since last visit:  none  Current CHF Medications - including dose:   Entresto or ACE/ARB:losratan 100mg po daily  Beta blocker: metoprolol 100mg po bid   Diuretic:none  Aldosterone antagonist: spironolactone 25mg po daily  Isosorbide 5mg po bid prn chest pain - pt has not needed in months      Changes to weight since last visit:   Pt is up 6 lbs since last visit  Home BP:  Pt has not been weighing himself at home    Current Adherence to CHF Therapies:  Partial pt has no idea what he is really taking, in fact he did NOT take any of his am medications and is quite hypertensive    SOCIAL HISTORY  Social History     Tobacco Use   Smoking Status Former Smoker   • Packs/day: 0.50   • Years: 14.00   • Pack years: 7.00   • Types: Cigarettes   • Quit date: 2020   • Years since quittin.4   Smokeless Tobacco Former User   • Types: Chew   • Quit date: 2018      Change in weight: pt has gained 6 lbs since last visit.  Exercise habits: no regular exercise program pt suffers from gout, and his pcp will not give him indomethacin.  Pt is not taking his allopurinol.  PCP has been refusing indomethacin for increased risk of MI/stroke      Diet: common adult, low protein to prevent gout,      DATA REVIEW  Most Recent CMP Panel:   Lab Results   Component Value Date    SODIUM 138 10/06/2020    POTASSIUM 4.5 10/06/2020    CHLORIDE 104 10/06/2020    CO2 23 10/06/2020    ANION 11.0 10/06/2020    GLUCOSE 127 (H) 10/06/2020    BUN 16 10/06/2020    CREATININE 1.17 10/06/2020    CALCIUM 8.9 10/06/2020    ASTSGOT 19 08/10/2020    ALTSGPT 24 08/10/2020    ALKPHOSPHAT 50 08/10/2020    TBILIRUBIN 0.4 08/10/2020    ALBUMIN 4.3 08/10/2020    AGRATIO 1.6 2020     CREACTPROT 1.46 (H) 10/02/2020       Renal function:  >60 ml/min  Other Pertinent Blood Work:     Other:      Recent Imaging Studies:    None since last visit    ASSESSMENT AND PLAN  Changes to medications:    Lifestyle Recommendations From Today's Visit:   Eating Plan: Concentrate on  low sodium, low protein to prevent gout  Exercise: pt would benefit from dedicated walking, however is unable to due to his gout  Recommendations for Other Cardiovascular Risk Factors, hazel all that apply:   Diabetes/Impaired Fasting Glucose- <100    Resulting CHF medications today (changes are bolded)  Entresto or ACE/ARB: losartan 100mg po daily  Beta blocker: Toprol  po daily  Diuretic:none  Aldosterone antagonist: spironolactone 25mg po daily  Isosorbide 10mg po bid as needed for chest pain - pt has not needed for many months    Pt is hypertensive -  185/132 in the office.  Pt did NOT take his medications this morning.  Will continue to monitor.  I am hesitant to change his medications since he didn't take them today.      Requested pt call me with exactly what he is taking.  Pt reports taking like 5 medications in the am and 3 in the pm.  Pt should be taking 7 qam.      Pt is very frustrated with his continued gout flares.  Pt is barely able to walk into the clinic.  Pt has been trying to reduce his protein intake, trying supplements to cure his gout, however nothing is helping.    Pt would like his indomethacin refilled, however that is a PCP issue, I offered to make him a follow up visit with PCP, however pt declined.      Studies Ordered at Todays Visit:  None   Blood Work Ordered At Today's visit:   None  Follow-Up:   4 weeks    Jessica Bell    CC:  Song Camarillo M.D.  No ref. provider found    xMedications reconciled  xFlow sheets updated

## 2020-12-30 ENCOUNTER — OFFICE VISIT (OUTPATIENT)
Dept: URGENT CARE | Facility: CLINIC | Age: 35
End: 2020-12-30
Payer: COMMERCIAL

## 2020-12-30 ENCOUNTER — HOSPITAL ENCOUNTER (OUTPATIENT)
Facility: MEDICAL CENTER | Age: 35
End: 2020-12-30
Attending: NURSE PRACTITIONER
Payer: COMMERCIAL

## 2020-12-30 VITALS
HEIGHT: 64 IN | DIASTOLIC BLOOD PRESSURE: 98 MMHG | RESPIRATION RATE: 14 BRPM | WEIGHT: 208 LBS | TEMPERATURE: 97 F | HEART RATE: 80 BPM | SYSTOLIC BLOOD PRESSURE: 164 MMHG | OXYGEN SATURATION: 98 % | BODY MASS INDEX: 35.51 KG/M2

## 2020-12-30 DIAGNOSIS — R03.0 ELEVATED BLOOD PRESSURE READING: ICD-10-CM

## 2020-12-30 DIAGNOSIS — R43.2 LOSS OF TASTE: ICD-10-CM

## 2020-12-30 DIAGNOSIS — J45.20 MILD INTERMITTENT ASTHMA, UNSPECIFIED WHETHER COMPLICATED: ICD-10-CM

## 2020-12-30 LAB — COVID ORDER STATUS COVID19: NORMAL

## 2020-12-30 PROCEDURE — 99214 OFFICE O/P EST MOD 30 MIN: CPT | Performed by: NURSE PRACTITIONER

## 2020-12-30 PROCEDURE — U0003 INFECTIOUS AGENT DETECTION BY NUCLEIC ACID (DNA OR RNA); SEVERE ACUTE RESPIRATORY SYNDROME CORONAVIRUS 2 (SARS-COV-2) (CORONAVIRUS DISEASE [COVID-19]), AMPLIFIED PROBE TECHNIQUE, MAKING USE OF HIGH THROUGHPUT TECHNOLOGIES AS DESCRIBED BY CMS-2020-01-R: HCPCS

## 2020-12-30 RX ORDER — ALBUTEROL SULFATE 90 UG/1
2 AEROSOL, METERED RESPIRATORY (INHALATION) EVERY 6 HOURS PRN
Qty: 8.5 G | Refills: 0 | Status: SHIPPED | OUTPATIENT
Start: 2020-12-30 | End: 2023-11-15

## 2020-12-30 ASSESSMENT — ENCOUNTER SYMPTOMS
VOMITING: 0
NAUSEA: 0
FEVER: 0
HEADACHES: 0
SINUS PAIN: 0
WHEEZING: 0
SORE THROAT: 0
COUGH: 0
SHORTNESS OF BREATH: 0
CHILLS: 0

## 2020-12-30 ASSESSMENT — FIBROSIS 4 INDEX: FIB4 SCORE: 0.43

## 2020-12-30 NOTE — PROGRESS NOTES
Subjective:     Mina Burch is a 35 y.o. male who presents for Coronavirus Screening (x 3 days with loss of taste and smell.  Hx of Asthma.)      Loss of taste and smell, .  Mild nasal congestion. No SOB. No N/V/D. No body aches. MI in September. Sees a cardiologist. Does not monitor BP at home. Possible white coat hx.     URI   This is a new problem. The current episode started in the past 7 days. There has been no fever. Associated symptoms include congestion. Pertinent negatives include no chest pain, coughing, ear pain, headaches, nausea, sinus pain, sore throat, vomiting or wheezing. He has tried nothing for the symptoms.       Past Medical History:   Diagnosis Date   • Diabetes (HCC)    • Hyperlipidemia    • Hypertension    • MI (myocardial infarction) (HCC) 2017    x2 stent placed   • Myocardial infarct (HCC) 2018    1 stent place       Past Surgical History:   Procedure Laterality Date   • OTHER CARDIAC SURGERY  2018    1 stent placed   • OTHER CARDIAC SURGERY  2017    2 stents       Social History     Socioeconomic History   • Marital status:      Spouse name: Not on file   • Number of children: Not on file   • Years of education: Not on file   • Highest education level: Not on file   Occupational History     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   Social Needs   • Financial resource strain: Not on file   • Food insecurity     Worry: Not on file     Inability: Not on file   • Transportation needs     Medical: Not on file     Non-medical: Not on file   Tobacco Use   • Smoking status: Former Smoker     Packs/day: 0.50     Years: 14.00     Pack years: 7.00     Types: Cigarettes     Quit date: 2020     Years since quittin.4   • Smokeless tobacco: Former User     Types: Chew     Quit date: 2018   Substance and Sexual Activity   • Alcohol use: Yes     Comment: occasionally    • Drug use: Yes     Types: Marijuana   • Sexual activity: Never     Partners: Female      "Comment:     Lifestyle   • Physical activity     Days per week: Not on file     Minutes per session: Not on file   • Stress: Not on file   Relationships   • Social connections     Talks on phone: Not on file     Gets together: Not on file     Attends Taoist service: Not on file     Active member of club or organization: Not on file     Attends meetings of clubs or organizations: Not on file     Relationship status: Not on file   • Intimate partner violence     Fear of current or ex partner: Not on file     Emotionally abused: Not on file     Physically abused: Not on file     Forced sexual activity: Not on file   Other Topics Concern   • Not on file   Social History Narrative   • Not on file        Family History   Problem Relation Age of Onset   • Heart Disease Father    • Hypertension Father    • Heart Attack Father 45   • Diabetes Mother    • Hypertension Mother    • Cancer Maternal Grandfather         Brain    • No Known Problems Paternal Grandmother    • No Known Problems Paternal Grandfather    • No Known Problems Son    • No Known Problems Daughter         No Known Allergies    Review of Systems   Constitutional: Negative for chills, fever and malaise/fatigue.   HENT: Positive for congestion. Negative for ear pain, sinus pain and sore throat.    Respiratory: Negative for cough, shortness of breath and wheezing.    Cardiovascular: Negative for chest pain and leg swelling.   Gastrointestinal: Negative for nausea and vomiting.   Neurological: Negative for headaches.   Endo/Heme/Allergies: Negative for environmental allergies.   All other systems reviewed and are negative.       Objective:   BP (!) 164/98   Pulse 80   Temp 36.1 °C (97 °F) (Temporal)   Resp 14   Ht 1.626 m (5' 4\")   Wt 94.3 kg (208 lb)   SpO2 98%   BMI 35.70 kg/m²     Physical Exam  Vitals signs reviewed.   Constitutional:       General: He is not in acute distress.     Appearance: He is well-developed. He is not toxic-appearing. "   HENT:      Head: Normocephalic and atraumatic.      Right Ear: External ear normal.      Left Ear: External ear normal.      Nose: Nose normal.      Mouth/Throat:      Mouth: Mucous membranes are moist.      Pharynx: No oropharyngeal exudate or posterior oropharyngeal erythema.   Eyes:      Conjunctiva/sclera: Conjunctivae normal.   Neck:      Musculoskeletal: Normal range of motion and neck supple.   Cardiovascular:      Rate and Rhythm: Normal rate and regular rhythm.   Pulmonary:      Effort: Pulmonary effort is normal. No respiratory distress.      Breath sounds: Normal breath sounds. No stridor. No wheezing, rhonchi or rales.   Musculoskeletal: Normal range of motion.   Skin:     General: Skin is warm and dry.      Findings: No rash.   Neurological:      General: No focal deficit present.      Mental Status: He is alert and oriented to person, place, and time.      GCS: GCS eye subscore is 4. GCS verbal subscore is 5. GCS motor subscore is 6.   Psychiatric:         Mood and Affect: Mood normal.         Speech: Speech normal.         Behavior: Behavior normal.         Thought Content: Thought content normal.         Judgment: Judgment normal.         Assessment/Plan:   1. Loss of taste  - COVID/SARS COV-2 PCR; Future    2. Mild intermittent asthma, unspecified whether complicated  - albuterol 108 (90 Base) MCG/ACT Aero Soln inhalation aerosol; Inhale 2 Puffs every 6 hours as needed for Shortness of Breath.  Dispense: 8.5 g; Refill: 0    3. Elevated blood pressure reading  -Monitor BP and follow up with PCP or cardiologist.     Discussed viral etiology. COVID S&S, and self isolation guidelines. S&S of PNA with follow up. Discussed risk of complications with hx of cardiac issues, DM, and asthma. Close monitoring of symptoms, with ER follow up for complications or concerns.     Symptomatic care.  -Oral hydration and rest.   -Cough control: nonpharmacologic options for cough relief such as throat lozenges, hot  tea, honey.  -Over the counter expectorant as directed; Guaifenesin (Mucinex).  -Tylenol or ibuprofen for pain and fever as directed.   -Albuterol inhaler as directed.    Seek emergency medical care immediately for: Trouble breathing, persistent pain or pressure in the chest, confusion, inability to wake or stay awake, bluish lips or face. Follow up for prolonged cough, persistent wheezing, tachycardia (fast heart rate) dizziness, persistent high grade fevers, or any other concerns. Follow up with PCP.     Differential diagnosis, natural history, supportive care, and indications for immediate follow-up discussed.

## 2020-12-30 NOTE — PATIENT INSTRUCTIONS
INSTRUCTIONS FOR COVID-19 OR ANY OTHER INFECTIOUS RESPIRATORY ILLNESSES    The Centers for Disease Control and Prevention (CDC) states that early indications for COVID-19 include cough, shortness of breath, difficulty breathing, or at least two of the following symptoms: chills, shaking with chills, muscle pain, headache, sore throat, and loss of taste or smell. Symptoms can range from mild to severe and may appear up to two weeks after exposure to the virus.    The practice of self-isolation and quarantine helps protect the public and your family by  preventing exposure to people who have or may have a contagious disease. Please follow the prevention steps below as based on CDC guidelines:    WHEN TO STOP ISOLATION: Persons with COVID-19 or any other infectious respiratory illness who have symptoms and were advised to care for themselves at home may discontinue home isolation under the following conditions:  · At least 24 hours have passed since recovery defined as resolution of fever without the use of fever-reducing medications; AND,  · Improvement in respiratory symptoms (e.g., cough, shortness of breath); AND,  · At least 10 days have passed since symptoms first appeared and have had no subsequent illness.    MONITOR YOUR SYMPTOMS: If your illness is worsening, seek prompt medical attention. If you have a medical emergency and need to call 911, notify the dispatch personnel that you have, or are being evaluated for confirmed or suspected COVID-19 or another infectious respiratory illness. Wear a facemask if possible.    ACTIVITY RESTRICTION: restrict activities outside your home, except for getting medical care. Do not go to work, school, or public areas. Avoid using public transportation, ride-sharing, or taxis.    SCHEDULED MEDICAL APPOINTMENTS: Notify your provider that you have, or are being evaluated for, confirmed or suspected COVID-19 or another infectious respiratory. This will help the healthcare  provider’s office safely take care of you and keep other people from getting exposed or infected.    FACEMASKS, when to wear: Anytime you are away from your home or around other people or pets. If you are unable to wear one, maintain a minimum of 6 feet distancing from others.    LIVING ENVIRONMENT: Stay in a separate room from other people and pets. If possible, use a separate bathroom, have someone else care for your pets and avoid sharing household items. Any items used should be washed thoroughly with soap and water. Clean all “high-touch” surfaces every day. Use a household cleaning spray or wipe, according to the label instructions. High touch surfaces include (but are not limited to) counters, tabletops, doorknobs, bathroom fixtures, toilets, phones, keyboards, tablets, and bedside tables.     HAND WASHING: Frequently wash hands with soap and water for at least 20 seconds,  especially after blowing your nose, coughing, or sneezing; going to the bathroom; before and after interacting with pets; and before and after eating or preparing food. If hands are visibly dirty use soap and water. If soap and water are not available, use an alcohol-based hand  with at least 60% alcohol. Avoid touching your eyes, nose, and mouth with unwashed hands. Cover your coughs and sneezes with a tissue. Throw used tissues in a lined trash can. Immediately wash your hands.    ACTIVE/FACILITATED SELF-MONITORING: Follow instructions provided by your local health department or health professionals, as appropriate. When working with your local health department check their available hours.    [facility]   Phone Number   [place] [phone]   [facility] [phone]   [place] Call [address]   [place] [phone]     IF YOU HAVE CONFIRMED POSITIVE COVID-19:    Those who have completely recovered from COVID-19 may have immune-boosting antibodies in their plasma--called “convalescent plasma”--that could be  used to treat critically ill COVID19 patients.    [facility] is excited to begin working with [facility] on collecting convalescent plasma from  people who have recovered from COVID-19 as part of a program to treat patients infected with the virus. This FDA-approved “emergency investigational new drug” is a special blood product containing antibodies that may give patients an extra boost to fight the virus.    To be eligible to donate convalescent plasma, you must have a prior COVID-19 diagnosis documented by a laboratory test (or a positive test result for SARS-CoV-2 antibodies) and meet additional eligibility requirements.    If you are interested in donating convalescent plasma or have any additional questions, please contact the [facility] Convalescent Plasma  at [phone] or via e-mail at [email or web address].      COVID-19  COVID-19 is a respiratory infection that is caused by a virus called severe acute respiratory syndrome coronavirus 2 (SARS-CoV-2). The disease is also known as coronavirus disease or novel coronavirus. In some people, the virus may not cause any symptoms. In others, it may cause a serious infection. The infection can get worse quickly and can lead to complications, such as:  · Pneumonia, or infection of the lungs.  · Acute respiratory distress syndrome or ARDS. This is fluid build-up in the lungs.  · Acute respiratory failure. This is a condition in which there is not enough oxygen passing from the lungs to the body.  · Sepsis or septic shock. This is a serious bodily reaction to an infection.  · Blood clotting problems.  · Secondary infections due to bacteria or fungus.  The virus that causes COVID-19 is contagious. This means that it can spread from person to person through droplets from coughs and sneezes (respiratory secretions).  What are the causes?  This illness is caused by a virus. You may catch the virus by:  · Breathing in droplets from an infected  person's cough or sneeze.  · Touching something, like a table or a doorknob, that was exposed to the virus (contaminated) and then touching your mouth, nose, or eyes.  What increases the risk?  Risk for infection  You are more likely to be infected with this virus if you:  · Live in or travel to an area with a COVID-19 outbreak.  · Come in contact with a sick person who recently traveled to an area with a COVID-19 outbreak.  · Provide care for or live with a person who is infected with COVID-19.  Risk for serious illness  You are more likely to become seriously ill from the virus if you:  · Are 65 years of age or older.  · Have a long-term disease that lowers your body's ability to fight infection (immunocompromised).  · Live in a nursing home or long-term care facility.  · Have a long-term (chronic) disease such as:  ? Chronic lung disease, including chronic obstructive pulmonary disease or asthma  ? Heart disease.  ? Diabetes.  ? Chronic kidney disease.  ? Liver disease.  · Are obese.  What are the signs or symptoms?  Symptoms of this condition can range from mild to severe. Symptoms may appear any time from 2 to 14 days after being exposed to the virus. They include:  · A fever.  · A cough.  · Difficulty breathing.  · Chills.  · Muscle pains.  · A sore throat.  · Loss of taste or smell.  Some people may also have stomach problems, such as nausea, vomiting, or diarrhea.  Other people may not have any symptoms of COVID-19.  How is this diagnosed?  This condition may be diagnosed based on:  · Your signs and symptoms, especially if:  ? You live in an area with a COVID-19 outbreak.  ? You recently traveled to or from an area where the virus is common.  ? You provide care for or live with a person who was diagnosed with COVID-19.  · A physical exam.  · Lab tests, which may include:  ? A nasal swab to take a sample of fluid from your nose.  ? A throat swab to take a sample of fluid from your throat.  ? A sample of mucus  from your lungs (sputum).  ? Blood tests.  · Imaging tests, which may include, X-rays, CT scan, or ultrasound.  How is this treated?  At present, there is no medicine to treat COVID-19. Medicines that treat other diseases are being used on a trial basis to see if they are effective against COVID-19.  Your health care provider will talk with you about ways to treat your symptoms. For most people, the infection is mild and can be managed at home with rest, fluids, and over-the-counter medicines.  Treatment for a serious infection usually takes places in a hospital intensive care unit (ICU). It may include one or more of the following treatments. These treatments are given until your symptoms improve.  · Receiving fluids and medicines through an IV.  · Supplemental oxygen. Extra oxygen is given through a tube in the nose, a face mask, or a agustin.  · Positioning you to lie on your stomach (prone position). This makes it easier for oxygen to get into the lungs.  · Continuous positive airway pressure (CPAP) or bi-level positive airway pressure (BPAP) machine. This treatment uses mild air pressure to keep the airways open. A tube that is connected to a motor delivers oxygen to the body.  · Ventilator. This treatment moves air into and out of the lungs by using a tube that is placed in your windpipe.  · Tracheostomy. This is a procedure to create a hole in the neck so that a breathing tube can be inserted.  · Extracorporeal membrane oxygenation (ECMO). This procedure gives the lungs a chance to recover by taking over the functions of the heart and lungs. It supplies oxygen to the body and removes carbon dioxide.  Follow these instructions at home:  Lifestyle  · If you are sick, stay home except to get medical care. Your health care provider will tell you how long to stay home. Call your health care provider before you go for medical care.  · Rest at home as told by your health care provider.  · Do not use any products that  contain nicotine or tobacco, such as cigarettes, e-cigarettes, and chewing tobacco. If you need help quitting, ask your health care provider.  · Return to your normal activities as told by your health care provider. Ask your health care provider what activities are safe for you.  General instructions  · Take over-the-counter and prescription medicines only as told by your health care provider.  · Drink enough fluid to keep your urine pale yellow.  · Keep all follow-up visits as told by your health care provider. This is important.  How is this prevented?    There is no vaccine to help prevent COVID-19 infection. However, there are steps you can take to protect yourself and others from this virus.  To protect yourself:   · Do not travel to areas where COVID-19 is a risk. The areas where COVID-19 is reported change often. To identify high-risk areas and travel restrictions, check the [facility] travel website: wwwnc.cdc.gov/travel/notices  · If you live in, or must travel to, an area where COVID-19 is a risk, take precautions to avoid infection.  ? Stay away from people who are sick.  ? Wash your hands often with soap and water for 20 seconds. If soap and water are not available, use an alcohol-based hand .  ? Avoid touching your mouth, face, eyes, or nose.  ? Avoid going out in public, follow guidance from your state and local health authorities.  ? If you must go out in public, wear a cloth face covering or face mask.  ? Disinfect objects and surfaces that are frequently touched every day. This may include:  § Counters and tables.  § Doorknobs and light switches.  § Sinks and faucets.  § Electronics, such as phones, remote controls, keyboards, computers, and tablets.  To protect others:  If you have symptoms of COVID-19, take steps to prevent the virus from spreading to others.  · If you think you have a COVID-19 infection, contact your health care provider right away. Tell your health care team that you think you  may have a COVID-19 infection.  · Stay home. Leave your house only to seek medical care. Do not use public transport.  · Do not travel while you are sick.  · Wash your hands often with soap and water for 20 seconds. If soap and water are not available, use alcohol-based hand .  · Stay away from other members of your household. Let healthy household members care for children and pets, if possible. If you have to care for children or pets, wash your hands often and wear a mask. If possible, stay in your own room, separate from others. Use a different bathroom.  · Make sure that all people in your household wash their hands well and often.  · Cough or sneeze into a tissue or your sleeve or elbow. Do not cough or sneeze into your hand or into the air.  · Wear a cloth face covering or face mask.  Where to find more information  · Centers for Disease Control and Prevention: www.cdc.gov/coronavirus/2019-ncov/index.html  · World Health Organization: www.who.int/health-topics/coronavirus  Contact a health care provider if:  · You live in or have traveled to an area where COVID-19 is a risk and you have symptoms of the infection.  · You have had contact with someone who has COVID-19 and you have symptoms of the infection.  Get help right away if:  · You have trouble breathing.  · You have pain or pressure in your chest.  · You have confusion.  · You have bluish lips and fingernails.  · You have difficulty waking from sleep.  · You have symptoms that get worse.  These symptoms may represent a serious problem that is an emergency. Do not wait to see if the symptoms will go away. Get medical help right away. Call your local emergency services (911 in the [place] Do not drive yourself to the hospital. Let the emergency medical personnel know if you think you have COVID-19.  Summary  · COVID-19 is a respiratory infection that is caused by a virus. It is also known as coronavirus disease or novel coronavirus. It can cause  "serious infections, such as pneumonia, acute respiratory distress syndrome, acute respiratory failure, or sepsis.  · The virus that causes COVID-19 is contagious. This means that it can spread from person to person through droplets from coughs and sneezes.  · You are more likely to develop a serious illness if you are 65 years of age or older, have a weak immunity, live in a nursing home, or have chronic disease.  · There is no medicine to treat COVID-19. Your health care provider will talk with you about ways to treat your symptoms.  · Take steps to protect yourself and others from infection. Wash your hands often and disinfect objects and surfaces that are frequently touched every day. Stay away from people who are sick and wear a mask if you are sick.  This information is not intended to replace advice given to you by your health care provider. Make sure you discuss any questions you have with your health care provider.  Document Released: 01/23/2020 Document Revised: 05/14/2020 Document Reviewed: 01/23/2020  [employer] Patient Education © 2020 [employer]      Hypertension, Adult  High blood pressure (hypertension) is when the force of blood pumping through the arteries is too strong. The arteries are the blood vessels that carry blood from the heart throughout the body. Hypertension forces the heart to work harder to pump blood and may cause arteries to become narrow or stiff. Untreated or uncontrolled hypertension can cause a heart attack, heart failure, a stroke, kidney disease, and other problems.  A blood pressure reading consists of a higher number over a lower number. Ideally, your blood pressure should be below 120/80. The first (\"top\") number is called the systolic pressure. It is a measure of the pressure in your arteries as your heart beats. The second (\"bottom\") number is called the diastolic pressure. It is a measure of the pressure in your arteries as the heart relaxes.  What are the causes?  The " exact cause of this condition is not known. There are some conditions that result in or are related to high blood pressure.  What increases the risk?  Some risk factors for high blood pressure are under your control. The following factors may make you more likely to develop this condition:  · Smoking.  · Having type 2 diabetes mellitus, high cholesterol, or both.  · Not getting enough exercise or physical activity.  · Being overweight.  · Having too much fat, sugar, calories, or salt (sodium) in your diet.  · Drinking too much alcohol.  Some risk factors for high blood pressure may be difficult or impossible to change. Some of these factors include:  · Having chronic kidney disease.  · Having a family history of high blood pressure.  · Age. Risk increases with age.  · Race. You may be at higher risk if you are .  · Gender. Men are at higher risk than women before age 45. After age 65, women are at higher risk than men.  · Having obstructive sleep apnea.  · Stress.  What are the signs or symptoms?  High blood pressure may not cause symptoms. Very high blood pressure (hypertensive crisis) may cause:  · Headache.  · Anxiety.  · Shortness of breath.  · Nosebleed.  · Nausea and vomiting.  · Vision changes.  · Severe chest pain.  · Seizures.  How is this diagnosed?  This condition is diagnosed by measuring your blood pressure while you are seated, with your arm resting on a flat surface, your legs uncrossed, and your feet flat on the floor. The cuff of the blood pressure monitor will be placed directly against the skin of your upper arm at the level of your heart. It should be measured at least twice using the same arm. Certain conditions can cause a difference in blood pressure between your right and left arms.  Certain factors can cause blood pressure readings to be lower or higher than normal for a short period of time:  · When your blood pressure is higher when you are in a health care provider's  office than when you are at home, this is called white coat hypertension. Most people with this condition do not need medicines.  · When your blood pressure is higher at home than when you are in a health care provider's office, this is called masked hypertension. Most people with this condition may need medicines to control blood pressure.  If you have a high blood pressure reading during one visit or you have normal blood pressure with other risk factors, you may be asked to:  · Return on a different day to have your blood pressure checked again.  · Monitor your blood pressure at home for 1 week or longer.  If you are diagnosed with hypertension, you may have other blood or imaging tests to help your health care provider understand your overall risk for other conditions.  How is this treated?  This condition is treated by making healthy lifestyle changes, such as eating healthy foods, exercising more, and reducing your alcohol intake. Your health care provider may prescribe medicine if lifestyle changes are not enough to get your blood pressure under control, and if:  · Your systolic blood pressure is above 130.  · Your diastolic blood pressure is above 80.  Your personal target blood pressure may vary depending on your medical conditions, your age, and other factors.  Follow these instructions at home:  Eating and drinking    · Eat a diet that is high in fiber and potassium, and low in sodium, added sugar, and fat. An example eating plan is called the DASH (Dietary Approaches to Stop Hypertension) diet. To eat this way:  ? Eat plenty of fresh fruits and vegetables. Try to fill one half of your plate at each meal with fruits and vegetables.  ? Eat whole grains, such as whole-wheat pasta, brown rice, or whole-grain bread. Fill about one fourth of your plate with whole grains.  ? Eat or drink low-fat dairy products, such as skim milk or low-fat yogurt.  ? Avoid fatty cuts of meat, processed or cured meats, and  poultry with skin. Fill about one fourth of your plate with lean proteins, such as fish, chicken without skin, beans, eggs, or tofu.  ? Avoid pre-made and processed foods. These tend to be higher in sodium, added sugar, and fat.  · Reduce your daily sodium intake. Most people with hypertension should eat less than 1,500 mg of sodium a day.  · Do not drink alcohol if:  ? Your health care provider tells you not to drink.  ? You are pregnant, may be pregnant, or are planning to become pregnant.  · If you drink alcohol:  ? Limit how much you use to:  § 0-1 drink a day for women.  § 0-2 drinks a day for men.  ? Be aware of how much alcohol is in your drink. In the [place] one drink equals one 12 oz bottle of beer (355 mL), one 5 oz glass of wine (148 mL), or one 1½ oz glass of hard liquor (44 mL).  Lifestyle    · Work with your health care provider to maintain a healthy body weight or to lose weight. Ask what an ideal weight is for you.  · Get at least 30 minutes of exercise most days of the week. Activities may include walking, swimming, or biking.  · Include exercise to strengthen your muscles (resistance exercise), such as Pilates or lifting weights, as part of your weekly exercise routine. Try to do these types of exercises for 30 minutes at least 3 days a week.  · Do not use any products that contain nicotine or tobacco, such as cigarettes, e-cigarettes, and chewing tobacco. If you need help quitting, ask your health care provider.  · Monitor your blood pressure at home as told by your health care provider.  · Keep all follow-up visits as told by your health care provider. This is important.  Medicines  · Take over-the-counter and prescription medicines only as told by your health care provider. Follow directions carefully. Blood pressure medicines must be taken as prescribed.  · Do not skip doses of blood pressure medicine. Doing this puts you at risk for problems and can make the medicine less effective.  · Ask your  health care provider about side effects or reactions to medicines that you should watch for.  Contact a health care provider if you:  · Think you are having a reaction to a medicine you are taking.  · Have headaches that keep coming back (recurring).  · Feel dizzy.  · Have swelling in your ankles.  · Have trouble with your vision.  Get help right away if you:  · Develop a severe headache or confusion.  · Have unusual weakness or numbness.  · Feel faint.  · Have severe pain in your chest or abdomen.  · Vomit repeatedly.  · Have trouble breathing.  Summary  · Hypertension is when the force of blood pumping through your arteries is too strong. If this condition is not controlled, it may put you at risk for serious complications.  · Your personal target blood pressure may vary depending on your medical conditions, your age, and other factors. For most people, a normal blood pressure is less than 120/80.  · Hypertension is treated with lifestyle changes, medicines, or a combination of both. Lifestyle changes include losing weight, eating a healthy, low-sodium diet, exercising more, and limiting alcohol.  This information is not intended to replace advice given to you by your health care provider. Make sure you discuss any questions you have with your health care provider.  Document Released: 12/18/2006 Document Revised: 08/28/2019 Document Reviewed: 08/28/2019  [employer] Patient Education © 2020 [employer]

## 2020-12-31 LAB
SARS-COV-2 RNA RESP QL NAA+PROBE: DETECTED
SPECIMEN SOURCE: ABNORMAL

## 2021-01-13 ENCOUNTER — TELEPHONE (OUTPATIENT)
Dept: MEDICAL GROUP | Facility: LAB | Age: 36
End: 2021-01-13

## 2021-01-13 NOTE — TELEPHONE ENCOUNTER
MEDICATION PRIOR AUTHORIZATION NEEDED:    1. Name of Medication: colchicine (COLCRYS) 0.6 MG Tab    2. Requested By (Name of Pharmacy): [facility]   P: [phone]  F: [phone]     3. Is insurance on file current? Yes    4. What is the name & phone number of the 3rd party payor?   [employer]  [phone]    Prior auth submitted to plan through CoverMyMeds, waiting for possible approval.  Key: [id number]

## 2021-01-22 ENCOUNTER — TELEPHONE (OUTPATIENT)
Dept: VASCULAR LAB | Facility: MEDICAL CENTER | Age: 36
End: 2021-01-22

## 2021-01-22 NOTE — TELEPHONE ENCOUNTER
Spoke with Mina who does NOT wish to follow with our clinic at this time.  Will await contact  Jessica Bell, Clinical Pharmacist, CDE, CACP

## 2021-06-11 ENCOUNTER — HOSPITAL ENCOUNTER (INPATIENT)
Dept: HOSPITAL 8 - ED | Age: 36
LOS: 1 days | Discharge: HOME | DRG: 202 | End: 2021-06-12
Attending: STUDENT IN AN ORGANIZED HEALTH CARE EDUCATION/TRAINING PROGRAM
Payer: COMMERCIAL

## 2021-06-11 VITALS — SYSTOLIC BLOOD PRESSURE: 143 MMHG | DIASTOLIC BLOOD PRESSURE: 91 MMHG

## 2021-06-11 VITALS — WEIGHT: 224.21 LBS | BODY MASS INDEX: 38.28 KG/M2 | HEIGHT: 64 IN

## 2021-06-11 VITALS — SYSTOLIC BLOOD PRESSURE: 149 MMHG | DIASTOLIC BLOOD PRESSURE: 94 MMHG

## 2021-06-11 DIAGNOSIS — Z95.5: ICD-10-CM

## 2021-06-11 DIAGNOSIS — Z87.891: ICD-10-CM

## 2021-06-11 DIAGNOSIS — Z86.16: ICD-10-CM

## 2021-06-11 DIAGNOSIS — I50.9: ICD-10-CM

## 2021-06-11 DIAGNOSIS — T38.0X5A: ICD-10-CM

## 2021-06-11 DIAGNOSIS — E66.9: ICD-10-CM

## 2021-06-11 DIAGNOSIS — I48.0: ICD-10-CM

## 2021-06-11 DIAGNOSIS — I13.0: ICD-10-CM

## 2021-06-11 DIAGNOSIS — J45.21: Primary | ICD-10-CM

## 2021-06-11 DIAGNOSIS — D72.829: ICD-10-CM

## 2021-06-11 DIAGNOSIS — Z83.3: ICD-10-CM

## 2021-06-11 DIAGNOSIS — N18.9: ICD-10-CM

## 2021-06-11 DIAGNOSIS — I25.10: ICD-10-CM

## 2021-06-11 DIAGNOSIS — Z82.49: ICD-10-CM

## 2021-06-11 DIAGNOSIS — Z82.5: ICD-10-CM

## 2021-06-11 DIAGNOSIS — M10.9: ICD-10-CM

## 2021-06-11 DIAGNOSIS — I21.A1: ICD-10-CM

## 2021-06-11 DIAGNOSIS — E11.22: ICD-10-CM

## 2021-06-11 LAB
ALBUMIN SERPL-MCNC: 3.1 G/DL (ref 3.4–5)
ANION GAP SERPL CALC-SCNC: 9 MMOL/L (ref 5–15)
BASOPHILS # BLD AUTO: 0.1 X10^3/UL (ref 0–0.1)
BASOPHILS NFR BLD AUTO: 2 % (ref 0–1)
CALCIUM SERPL-MCNC: 9.2 MG/DL (ref 8.5–10.1)
CHLORIDE SERPL-SCNC: 108 MMOL/L (ref 98–107)
CREAT SERPL-MCNC: 1.72 MG/DL (ref 0.7–1.3)
EOSINOPHIL # BLD AUTO: 0.1 X10^3/UL (ref 0–0.4)
EOSINOPHIL NFR BLD AUTO: 1 % (ref 1–7)
ERYTHROCYTE [DISTWIDTH] IN BLOOD BY AUTOMATED COUNT: 14 % (ref 9.4–14.8)
EST. AVERAGE GLUCOSE BLD GHB EST-MCNC: 192 MG/DL (ref 0–126)
LYMPHOCYTES # BLD AUTO: 2.8 X10^3/UL (ref 1–3.4)
LYMPHOCYTES NFR BLD AUTO: 28 % (ref 22–44)
MCH RBC QN AUTO: 27.4 PG (ref 27.5–34.5)
MCHC RBC AUTO-ENTMCNC: 34 G/DL (ref 33.2–36.2)
MONOCYTES # BLD AUTO: 0.7 X10^3/UL (ref 0.2–0.8)
MONOCYTES NFR BLD AUTO: 7 % (ref 2–9)
NEUTROPHILS # BLD AUTO: 6.1 X10^3/UL (ref 1.8–6.8)
NEUTROPHILS NFR BLD AUTO: 62 % (ref 42–75)
PLATELET # BLD AUTO: 365 X10^3/UL (ref 130–400)
PMV BLD AUTO: 7.9 FL (ref 7.4–10.4)
RBC # BLD AUTO: 5.02 X10^6/UL (ref 4.38–5.82)
TROPONIN I SERPL-MCNC: 0.17 NG/ML (ref 0–0.04)
TROPONIN I SERPL-MCNC: 0.19 NG/ML (ref 0–0.04)

## 2021-06-11 PROCEDURE — 36415 COLL VENOUS BLD VENIPUNCTURE: CPT

## 2021-06-11 PROCEDURE — 83036 HEMOGLOBIN GLYCOSYLATED A1C: CPT

## 2021-06-11 PROCEDURE — C8929 TTE W OR WO FOL WCON,DOPPLER: HCPCS

## 2021-06-11 PROCEDURE — 93005 ELECTROCARDIOGRAM TRACING: CPT

## 2021-06-11 PROCEDURE — 84443 ASSAY THYROID STIM HORMONE: CPT

## 2021-06-11 PROCEDURE — 80048 BASIC METABOLIC PNL TOTAL CA: CPT

## 2021-06-11 PROCEDURE — 85025 COMPLETE CBC W/AUTO DIFF WBC: CPT

## 2021-06-11 PROCEDURE — 83735 ASSAY OF MAGNESIUM: CPT

## 2021-06-11 PROCEDURE — 82040 ASSAY OF SERUM ALBUMIN: CPT

## 2021-06-11 PROCEDURE — 82962 GLUCOSE BLOOD TEST: CPT

## 2021-06-11 PROCEDURE — 84484 ASSAY OF TROPONIN QUANT: CPT

## 2021-06-11 PROCEDURE — 71045 X-RAY EXAM CHEST 1 VIEW: CPT

## 2021-06-11 PROCEDURE — 83880 ASSAY OF NATRIURETIC PEPTIDE: CPT

## 2021-06-11 PROCEDURE — 99285 EMERGENCY DEPT VISIT HI MDM: CPT

## 2021-06-11 RX ADMIN — INSULIN LISPRO SCH UNITS: 100 INJECTION, SOLUTION INTRAVENOUS; SUBCUTANEOUS at 16:00

## 2021-06-11 RX ADMIN — ISOSORBIDE DINITRATE SCH MG: 10 TABLET ORAL at 21:30

## 2021-06-11 RX ADMIN — SODIUM CHLORIDE SCH MLS/HR: 0.9 INJECTION, SOLUTION INTRAVENOUS at 16:00

## 2021-06-11 RX ADMIN — HEPARIN SODIUM SCH UNITS: 5000 INJECTION, SOLUTION INTRAVENOUS; SUBCUTANEOUS at 16:58

## 2021-06-11 RX ADMIN — INSULIN LISPRO SCH UNITS: 100 INJECTION, SOLUTION INTRAVENOUS; SUBCUTANEOUS at 21:31

## 2021-06-11 RX ADMIN — METOPROLOL TARTRATE SCH MG: 50 TABLET, FILM COATED ORAL at 16:58

## 2021-06-11 NOTE — NUR
REPORT TO MARGUERITE MELENDREZ, PT TRANSPORTED TO [facility]. PT DENIES CHEST PAIN AT 
TIME OF TRANSPORT. PT A&O, RESPS EVEN AND UNLABORED, NSR ON CARDIAC MONITOR 
WITH NO ECTOPY. PT HAD NO DYSPNEA AT TIME OF TRANSPORT, SPEAKING IN FULL 
SENTENCES WITHOUT DIFFICULTLY.

## 2021-06-11 NOTE — NUR
MARGUERITE MELENDREZ AWARE OF HYPOXIA NOTED BRIEFLY DURING SLEEP, PT ON OXYGEN AT TIME 
OF TRANSPORT, SATURATING 100% ON 2L/MIN VIA NC. RESPS EVEN AND UNLABORED.

## 2021-06-11 NOTE — NUR
PT PLACED ON OXYGEN AT 2L/MIN VIA NC AS PT WAS HYPOXIC WHILE SLEEPING AT 79%, 
SNORES DURING SLEEP. WHEN AWAKENED, PT QUICKLY RETURNS TO SPO2 >95%. PT UNSURE 
IF HE HAS HX SLEEP APNEA. NO S/SX RESP DISTRESS WHILE AWAKE.

## 2021-06-11 NOTE — NUR
pt already took 81mg aspirin today, MD manley informed. MD instructed RN to 
administer 243mg baby aspirin at this time. pt a&o, resps even and unlabored, 
all monitors in place. sinus tach rate 90s on cardiac monitor. pt denies chest 
pain. pt updated with poc. awaiting admit orders and bed assignment.

## 2021-06-12 VITALS — DIASTOLIC BLOOD PRESSURE: 93 MMHG | SYSTOLIC BLOOD PRESSURE: 156 MMHG

## 2021-06-12 VITALS — SYSTOLIC BLOOD PRESSURE: 149 MMHG | DIASTOLIC BLOOD PRESSURE: 96 MMHG

## 2021-06-12 VITALS — SYSTOLIC BLOOD PRESSURE: 148 MMHG | DIASTOLIC BLOOD PRESSURE: 88 MMHG

## 2021-06-12 LAB
ANION GAP SERPL CALC-SCNC: 10 MMOL/L (ref 5–15)
BASOPHILS # BLD AUTO: 0 X10^3/UL (ref 0–0.1)
BASOPHILS NFR BLD AUTO: 0 % (ref 0–1)
CALCIUM SERPL-MCNC: 8.9 MG/DL (ref 8.5–10.1)
CHLORIDE SERPL-SCNC: 111 MMOL/L (ref 98–107)
CREAT SERPL-MCNC: 1.5 MG/DL (ref 0.7–1.3)
EOSINOPHIL # BLD AUTO: 0 X10^3/UL (ref 0–0.4)
EOSINOPHIL NFR BLD AUTO: 0 % (ref 1–7)
ERYTHROCYTE [DISTWIDTH] IN BLOOD BY AUTOMATED COUNT: 13.9 % (ref 9.4–14.8)
LYMPHOCYTES # BLD AUTO: 0.9 X10^3/UL (ref 1–3.4)
LYMPHOCYTES NFR BLD AUTO: 8 % (ref 22–44)
MCH RBC QN AUTO: 26.9 PG (ref 27.5–34.5)
MCHC RBC AUTO-ENTMCNC: 32.6 G/DL (ref 33.2–36.2)
MONOCYTES # BLD AUTO: 0.4 X10^3/UL (ref 0.2–0.8)
MONOCYTES NFR BLD AUTO: 4 % (ref 2–9)
NEUTROPHILS # BLD AUTO: 10.1 X10^3/UL (ref 1.8–6.8)
NEUTROPHILS NFR BLD AUTO: 88 % (ref 42–75)
PLATELET # BLD AUTO: 380 X10^3/UL (ref 130–400)
PMV BLD AUTO: 8.3 FL (ref 7.4–10.4)
RBC # BLD AUTO: 4.97 X10^6/UL (ref 4.38–5.82)

## 2021-06-12 RX ADMIN — ISOSORBIDE DINITRATE SCH MG: 10 TABLET ORAL at 08:36

## 2021-06-12 RX ADMIN — SODIUM CHLORIDE SCH MLS/HR: 0.9 INJECTION, SOLUTION INTRAVENOUS at 12:00

## 2021-06-12 RX ADMIN — METOPROLOL TARTRATE SCH MG: 50 TABLET, FILM COATED ORAL at 05:13

## 2021-06-12 RX ADMIN — HEPARIN SODIUM SCH UNITS: 5000 INJECTION, SOLUTION INTRAVENOUS; SUBCUTANEOUS at 00:17

## 2021-06-12 RX ADMIN — INSULIN LISPRO SCH UNITS: 100 INJECTION, SOLUTION INTRAVENOUS; SUBCUTANEOUS at 08:35

## 2021-06-12 RX ADMIN — INSULIN LISPRO SCH UNITS: 100 INJECTION, SOLUTION INTRAVENOUS; SUBCUTANEOUS at 16:00

## 2021-06-12 RX ADMIN — HEPARIN SODIUM SCH UNITS: 5000 INJECTION, SOLUTION INTRAVENOUS; SUBCUTANEOUS at 08:34

## 2021-06-12 RX ADMIN — INSULIN LISPRO SCH UNITS: 100 INJECTION, SOLUTION INTRAVENOUS; SUBCUTANEOUS at 12:40

## 2021-06-12 RX ADMIN — HEPARIN SODIUM SCH UNITS: 5000 INJECTION, SOLUTION INTRAVENOUS; SUBCUTANEOUS at 16:00

## 2021-07-13 ENCOUNTER — HOSPITAL ENCOUNTER (OUTPATIENT)
Dept: LAB | Facility: MEDICAL CENTER | Age: 36
End: 2021-07-13
Attending: PHYSICIAN ASSISTANT
Payer: COMMERCIAL

## 2021-07-13 LAB
ANION GAP SERPL CALC-SCNC: 12 MMOL/L (ref 7–16)
BUN SERPL-MCNC: 18 MG/DL (ref 8–22)
CALCIUM SERPL-MCNC: 9.7 MG/DL (ref 8.5–10.5)
CHLORIDE SERPL-SCNC: 106 MMOL/L (ref 96–112)
CO2 SERPL-SCNC: 22 MMOL/L (ref 20–33)
CREAT SERPL-MCNC: 1.25 MG/DL (ref 0.5–1.4)
GLUCOSE SERPL-MCNC: 145 MG/DL (ref 65–99)
POTASSIUM SERPL-SCNC: 4.9 MMOL/L (ref 3.6–5.5)
SODIUM SERPL-SCNC: 140 MMOL/L (ref 135–145)

## 2021-07-13 PROCEDURE — 36415 COLL VENOUS BLD VENIPUNCTURE: CPT

## 2021-07-13 PROCEDURE — 80048 BASIC METABOLIC PNL TOTAL CA: CPT

## 2022-03-18 NOTE — ED TRIAGE NOTES
CONSULT


Date of Consult


Date of Consult


DATE: 3/18/22 


TIME: 09:13





Reason for Consult


Reason for Consult:


CRC screening





Identification/Chief Complaint


Chief Complaint


CRC screening





History of Present Illness


Reason for Visit:


50 yo M seen for colorectal cancer screening. No priro screening is noted. Bowel

habits are regular without diarrhea or constipation. No melena and/or 

hematochezia is present. Weight and appetite are stable. He otherwise is without

additional complaints.





Past Medical History


Cardiovascular:  HTN, Hyperlipidemia





Past Surgical History


Past Surgical History:  No pertinent history





Family History


Family History:  Hypertension





Social History


No


ALCOHOL:  social





Current Medications


Current Medications





Current Medications


Ringer's Solution 1,000 ml @  50 mls/hr Q20H IV  Last administered on 3/18/22at 

08:56;  Start 3/18/22 at 07:00;  Stop 3/18/22 at 18:59


Ringer's Solution 1,000 ml @  75 mls/hr [id number] IV ;  Start 3/18/22 at 09:00;  

Stop 3/19/22 at 08:59


Propofol (Diprivan) 200 mg STK-MED ONCE IV ;  Start 3/18/22 at 09:07;  Stop 

3/18/22 at 09:07;  Status DC





Active Scripts


Active


Reported


Losartan Potassium 50 Mg Tablet 50 Mg PO DAILY


Hydrochlorothiazide Tablet (Hydrochlorothiazide) 12.5 Mg Tablet 12.5 Mg PO DAILY


Norvasc (Amlodipine Besylate) 5 Mg Tablet 1 Tab PO DAILY


Celebrex (Celecoxib) 200 Mg Capsule 1 Cap PO DAILY


Crestor (Rosuvastatin Calcium) 5 Mg Tablet 10 Mg PO HS





Allergies


Allergies:  


Coded Allergies:  


     No Known Drug Allergies (Unverified , 3/18/22)





Physical Exam


General:  Alert, Oriented X3, Cooperative


Lungs:  Clear to auscultation


Heart:  Normal S1, Normal S2


Abdomen:  Normal bowel sounds, Soft, No tenderness





Vitals


VITALS





Vital Signs








  Date Time  Temp Pulse Resp B/P (MAP) Pulse Ox O2 Delivery O2 Flow Rate FiO2


 


3/18/22 08:34 97.8 86 20  97   





 97.8       











Assessment/Plan


Assessment/Plan


CRC screening- is recommended at this time. R/B discussed with patient who is 

willing to proceed.











DONG PEÑA MD             Mar 18, 2022 09:16 EKG completed. Pt ambulatory to triage c/o intermittent palpations since 0430 today. Denies chest pain, SOB or n/v. Hx of HTN, noncompliant with meds. Pt is hypertensive in triage. Charge RN informed. Instructed pt to inform staff of any changes.

## 2022-11-15 ENCOUNTER — HOSPITAL ENCOUNTER (OUTPATIENT)
Facility: MEDICAL CENTER | Age: 37
End: 2022-11-15
Attending: NURSE PRACTITIONER
Payer: COMMERCIAL

## 2022-11-15 LAB
APPEARANCE FLD: CLEAR
BODY FLD TYPE: NORMAL
COLOR FLD: YELLOW
CRYSTALS FLD MICRO: NORMAL
GRAM STN SPEC: NORMAL
LYMPHOCYTES NFR FLD: 93 %
MONONUC CELLS NFR FLD: 7 %
RBC # FLD: <2000 CELLS/UL
SIGNIFICANT IND 70042: NORMAL
SITE SITE: NORMAL
SOURCE SOURCE: NORMAL
WBC # FLD: 227 CELLS/UL

## 2022-11-15 PROCEDURE — 87205 SMEAR GRAM STAIN: CPT

## 2022-11-15 PROCEDURE — 87070 CULTURE OTHR SPECIMN AEROBIC: CPT

## 2022-11-15 PROCEDURE — 89051 BODY FLUID CELL COUNT: CPT

## 2022-11-15 PROCEDURE — 87075 CULTR BACTERIA EXCEPT BLOOD: CPT

## 2022-11-15 PROCEDURE — 89060 EXAM SYNOVIAL FLUID CRYSTALS: CPT

## 2022-11-18 LAB
BACTERIA FLD AEROBE CULT: NORMAL
GRAM STN SPEC: NORMAL
SIGNIFICANT IND 70042: NORMAL
SITE SITE: NORMAL
SOURCE SOURCE: NORMAL

## 2022-11-20 LAB
BACTERIA SPEC ANAEROBE CULT: NORMAL
SIGNIFICANT IND 70042: NORMAL
SITE SITE: NORMAL
SOURCE SOURCE: NORMAL

## 2023-11-14 ENCOUNTER — HOSPITAL ENCOUNTER (OUTPATIENT)
Dept: RADIOLOGY | Facility: MEDICAL CENTER | Age: 38
End: 2023-11-14
Payer: COMMERCIAL

## 2023-11-15 ENCOUNTER — APPOINTMENT (OUTPATIENT)
Dept: RADIOLOGY | Facility: MEDICAL CENTER | Age: 38
DRG: 989 | End: 2023-11-15
Attending: EMERGENCY MEDICINE
Payer: COMMERCIAL

## 2023-11-15 ENCOUNTER — HOSPITAL ENCOUNTER (INPATIENT)
Facility: MEDICAL CENTER | Age: 38
LOS: 1 days | DRG: 989 | End: 2023-11-16
Attending: EMERGENCY MEDICINE | Admitting: STUDENT IN AN ORGANIZED HEALTH CARE EDUCATION/TRAINING PROGRAM
Payer: COMMERCIAL

## 2023-11-15 DIAGNOSIS — E11.40 TYPE 2 DIABETES MELLITUS WITH DIABETIC NEUROPATHY, WITHOUT LONG-TERM CURRENT USE OF INSULIN (HCC): ICD-10-CM

## 2023-11-15 DIAGNOSIS — N49.2 SCROTAL ABSCESS: ICD-10-CM

## 2023-11-15 LAB
ALBUMIN SERPL BCP-MCNC: 3.9 G/DL (ref 3.2–4.9)
ALBUMIN/GLOB SERPL: 1.6 G/DL
ALP SERPL-CCNC: 69 U/L (ref 30–99)
ALT SERPL-CCNC: 28 U/L (ref 2–50)
ANION GAP SERPL CALC-SCNC: 12 MMOL/L (ref 7–16)
APPEARANCE UR: CLEAR
AST SERPL-CCNC: 17 U/L (ref 12–45)
BASOPHILS # BLD AUTO: 0.7 % (ref 0–1.8)
BASOPHILS # BLD: 0.09 K/UL (ref 0–0.12)
BILIRUB SERPL-MCNC: 0.4 MG/DL (ref 0.1–1.5)
BILIRUB UR QL STRIP.AUTO: NEGATIVE
BUN SERPL-MCNC: 21 MG/DL (ref 8–22)
CALCIUM ALBUM COR SERPL-MCNC: 8.8 MG/DL (ref 8.5–10.5)
CALCIUM SERPL-MCNC: 8.7 MG/DL (ref 8.5–10.5)
CHLORIDE SERPL-SCNC: 99 MMOL/L (ref 96–112)
CHOLEST SERPL-MCNC: 111 MG/DL (ref 100–199)
CO2 SERPL-SCNC: 22 MMOL/L (ref 20–33)
COLOR UR: YELLOW
CREAT SERPL-MCNC: 1.13 MG/DL (ref 0.5–1.4)
EOSINOPHIL # BLD AUTO: 0.53 K/UL (ref 0–0.51)
EOSINOPHIL NFR BLD: 4.3 % (ref 0–6.9)
ERYTHROCYTE [DISTWIDTH] IN BLOOD BY AUTOMATED COUNT: 37.1 FL (ref 35.9–50)
EST. AVERAGE GLUCOSE BLD GHB EST-MCNC: 395 MG/DL
GFR SERPLBLD CREATININE-BSD FMLA CKD-EPI: 85 ML/MIN/1.73 M 2
GLOBULIN SER CALC-MCNC: 2.5 G/DL (ref 1.9–3.5)
GLUCOSE BLD STRIP.AUTO-MCNC: 273 MG/DL (ref 65–99)
GLUCOSE BLD STRIP.AUTO-MCNC: 295 MG/DL (ref 65–99)
GLUCOSE BLD STRIP.AUTO-MCNC: 317 MG/DL (ref 65–99)
GLUCOSE BLD STRIP.AUTO-MCNC: 345 MG/DL (ref 65–99)
GLUCOSE BLD STRIP.AUTO-MCNC: 346 MG/DL (ref 65–99)
GLUCOSE SERPL-MCNC: 465 MG/DL (ref 65–99)
GLUCOSE UR STRIP.AUTO-MCNC: >=1000 MG/DL
GRAM STN SPEC: NORMAL
HBA1C MFR BLD: 15.4 % (ref 4–5.6)
HCT VFR BLD AUTO: 43 % (ref 42–52)
HDLC SERPL-MCNC: 25 MG/DL
HGB BLD-MCNC: 14.4 G/DL (ref 14–18)
IMM GRANULOCYTES # BLD AUTO: 0.06 K/UL (ref 0–0.11)
IMM GRANULOCYTES NFR BLD AUTO: 0.5 % (ref 0–0.9)
KETONES UR STRIP.AUTO-MCNC: NEGATIVE MG/DL
LACTATE SERPL-SCNC: 1.5 MMOL/L (ref 0.5–2)
LACTATE SERPL-SCNC: 1.6 MMOL/L (ref 0.5–2)
LDLC SERPL CALC-MCNC: 48 MG/DL
LEUKOCYTE ESTERASE UR QL STRIP.AUTO: NEGATIVE
LYMPHOCYTES # BLD AUTO: 3.74 K/UL (ref 1–4.8)
LYMPHOCYTES NFR BLD: 30.5 % (ref 22–41)
MCH RBC QN AUTO: 28.7 PG (ref 27–33)
MCHC RBC AUTO-ENTMCNC: 33.5 G/DL (ref 32.3–36.5)
MCV RBC AUTO: 85.8 FL (ref 81.4–97.8)
MICRO URNS: ABNORMAL
MONOCYTES # BLD AUTO: 0.83 K/UL (ref 0–0.85)
MONOCYTES NFR BLD AUTO: 6.8 % (ref 0–13.4)
NEUTROPHILS # BLD AUTO: 7.03 K/UL (ref 1.82–7.42)
NEUTROPHILS NFR BLD: 57.2 % (ref 44–72)
NITRITE UR QL STRIP.AUTO: NEGATIVE
NRBC # BLD AUTO: 0 K/UL
NRBC BLD-RTO: 0 /100 WBC (ref 0–0.2)
PH UR STRIP.AUTO: 5.5 [PH] (ref 5–8)
PLATELET # BLD AUTO: 353 K/UL (ref 164–446)
PMV BLD AUTO: 9.4 FL (ref 9–12.9)
POTASSIUM SERPL-SCNC: 4.1 MMOL/L (ref 3.6–5.5)
PROT SERPL-MCNC: 6.4 G/DL (ref 6–8.2)
PROT UR QL STRIP: NEGATIVE MG/DL
RBC # BLD AUTO: 5.01 M/UL (ref 4.7–6.1)
RBC UR QL AUTO: NEGATIVE
SCCMEC + MECA PNL NOSE NAA+PROBE: NEGATIVE
SIGNIFICANT IND 70042: NORMAL
SITE SITE: NORMAL
SODIUM SERPL-SCNC: 133 MMOL/L (ref 135–145)
SOURCE SOURCE: NORMAL
SP GR UR STRIP.AUTO: 1.03
TRIGL SERPL-MCNC: 189 MG/DL (ref 0–149)
UROBILINOGEN UR STRIP.AUTO-MCNC: 0.2 MG/DL
WBC # BLD AUTO: 12.3 K/UL (ref 4.8–10.8)

## 2023-11-15 PROCEDURE — 80053 COMPREHEN METABOLIC PANEL: CPT

## 2023-11-15 PROCEDURE — 87070 CULTURE OTHR SPECIMN AEROBIC: CPT

## 2023-11-15 PROCEDURE — A9270 NON-COVERED ITEM OR SERVICE: HCPCS | Performed by: STUDENT IN AN ORGANIZED HEALTH CARE EDUCATION/TRAINING PROGRAM

## 2023-11-15 PROCEDURE — 87205 SMEAR GRAM STAIN: CPT

## 2023-11-15 PROCEDURE — 96366 THER/PROPH/DIAG IV INF ADDON: CPT

## 2023-11-15 PROCEDURE — 700117 HCHG RX CONTRAST REV CODE 255: Performed by: EMERGENCY MEDICINE

## 2023-11-15 PROCEDURE — 87040 BLOOD CULTURE FOR BACTERIA: CPT

## 2023-11-15 PROCEDURE — 83036 HEMOGLOBIN GLYCOSYLATED A1C: CPT

## 2023-11-15 PROCEDURE — 72193 CT PELVIS W/DYE: CPT

## 2023-11-15 PROCEDURE — 700102 HCHG RX REV CODE 250 W/ 637 OVERRIDE(OP)

## 2023-11-15 PROCEDURE — 87641 MR-STAPH DNA AMP PROBE: CPT

## 2023-11-15 PROCEDURE — 700105 HCHG RX REV CODE 258: Performed by: EMERGENCY MEDICINE

## 2023-11-15 PROCEDURE — 99285 EMERGENCY DEPT VISIT HI MDM: CPT

## 2023-11-15 PROCEDURE — 700105 HCHG RX REV CODE 258: Performed by: STUDENT IN AN ORGANIZED HEALTH CARE EDUCATION/TRAINING PROGRAM

## 2023-11-15 PROCEDURE — 99223 1ST HOSP IP/OBS HIGH 75: CPT | Performed by: STUDENT IN AN ORGANIZED HEALTH CARE EDUCATION/TRAINING PROGRAM

## 2023-11-15 PROCEDURE — 82962 GLUCOSE BLOOD TEST: CPT | Mod: 91

## 2023-11-15 PROCEDURE — 36415 COLL VENOUS BLD VENIPUNCTURE: CPT

## 2023-11-15 PROCEDURE — 81003 URINALYSIS AUTO W/O SCOPE: CPT

## 2023-11-15 PROCEDURE — 80061 LIPID PANEL: CPT

## 2023-11-15 PROCEDURE — 87086 URINE CULTURE/COLONY COUNT: CPT

## 2023-11-15 PROCEDURE — 303977 HCHG I & D

## 2023-11-15 PROCEDURE — 83605 ASSAY OF LACTIC ACID: CPT | Mod: 91

## 2023-11-15 PROCEDURE — 85025 COMPLETE CBC W/AUTO DIFF WBC: CPT

## 2023-11-15 PROCEDURE — 87077 CULTURE AEROBIC IDENTIFY: CPT

## 2023-11-15 PROCEDURE — 700102 HCHG RX REV CODE 250 W/ 637 OVERRIDE(OP): Performed by: STUDENT IN AN ORGANIZED HEALTH CARE EDUCATION/TRAINING PROGRAM

## 2023-11-15 PROCEDURE — 96365 THER/PROPH/DIAG IV INF INIT: CPT

## 2023-11-15 PROCEDURE — 700101 HCHG RX REV CODE 250: Performed by: EMERGENCY MEDICINE

## 2023-11-15 PROCEDURE — 96375 TX/PRO/DX INJ NEW DRUG ADDON: CPT

## 2023-11-15 PROCEDURE — 96372 THER/PROPH/DIAG INJ SC/IM: CPT

## 2023-11-15 PROCEDURE — 700111 HCHG RX REV CODE 636 W/ 250 OVERRIDE (IP): Performed by: STUDENT IN AN ORGANIZED HEALTH CARE EDUCATION/TRAINING PROGRAM

## 2023-11-15 PROCEDURE — 96367 TX/PROPH/DG ADDL SEQ IV INF: CPT

## 2023-11-15 PROCEDURE — 700111 HCHG RX REV CODE 636 W/ 250 OVERRIDE (IP): Mod: JZ | Performed by: EMERGENCY MEDICINE

## 2023-11-15 PROCEDURE — 770001 HCHG ROOM/CARE - MED/SURG/GYN PRIV*

## 2023-11-15 PROCEDURE — 0V950ZZ DRAINAGE OF SCROTUM, OPEN APPROACH: ICD-10-PCS | Performed by: EMERGENCY MEDICINE

## 2023-11-15 PROCEDURE — 700111 HCHG RX REV CODE 636 W/ 250 OVERRIDE (IP): Mod: JZ

## 2023-11-15 RX ORDER — CLOPIDOGREL BISULFATE 75 MG/1
75 TABLET ORAL DAILY
Status: DISCONTINUED | OUTPATIENT
Start: 2023-11-15 | End: 2023-11-15

## 2023-11-15 RX ORDER — MIDAZOLAM HYDROCHLORIDE 1 MG/ML
1-5 INJECTION INTRAMUSCULAR; INTRAVENOUS ONCE
Status: COMPLETED | OUTPATIENT
Start: 2023-11-15 | End: 2023-11-15

## 2023-11-15 RX ORDER — LIDOCAINE HYDROCHLORIDE 20 MG/ML
INJECTION, SOLUTION INFILTRATION; PERINEURAL
Status: COMPLETED
Start: 2023-11-15 | End: 2023-11-15

## 2023-11-15 RX ORDER — AMOXICILLIN 250 MG
2 CAPSULE ORAL 2 TIMES DAILY
Status: DISCONTINUED | OUTPATIENT
Start: 2023-11-15 | End: 2023-11-16 | Stop reason: HOSPADM

## 2023-11-15 RX ORDER — DOXYCYCLINE HYCLATE 100 MG/1
100 CAPSULE ORAL 2 TIMES DAILY
Status: ON HOLD | COMMUNITY
Start: 2023-11-08 | End: 2023-11-16

## 2023-11-15 RX ORDER — INSULIN LISPRO 100 [IU]/ML
0.2 INJECTION, SOLUTION INTRAVENOUS; SUBCUTANEOUS
Status: DISCONTINUED | OUTPATIENT
Start: 2023-11-15 | End: 2023-11-15

## 2023-11-15 RX ORDER — ISOSORBIDE DINITRATE 5 MG/1
5 TABLET ORAL 2 TIMES DAILY PRN
Status: DISCONTINUED | OUTPATIENT
Start: 2023-11-15 | End: 2023-11-16 | Stop reason: HOSPADM

## 2023-11-15 RX ORDER — ALBUTEROL SULFATE 90 UG/1
2 AEROSOL, METERED RESPIRATORY (INHALATION) EVERY 6 HOURS PRN
Status: DISCONTINUED | OUTPATIENT
Start: 2023-11-15 | End: 2023-11-15

## 2023-11-15 RX ORDER — VALSARTAN 80 MG/1
160 TABLET ORAL 2 TIMES DAILY
Status: DISCONTINUED | OUTPATIENT
Start: 2023-11-15 | End: 2023-11-16 | Stop reason: HOSPADM

## 2023-11-15 RX ORDER — METOPROLOL SUCCINATE 100 MG/1
100 TABLET, EXTENDED RELEASE ORAL 2 TIMES DAILY
Status: DISCONTINUED | OUTPATIENT
Start: 2023-11-15 | End: 2023-11-16 | Stop reason: HOSPADM

## 2023-11-15 RX ORDER — ONDANSETRON 2 MG/ML
4 INJECTION INTRAMUSCULAR; INTRAVENOUS ONCE
Status: COMPLETED | OUTPATIENT
Start: 2023-11-15 | End: 2023-11-15

## 2023-11-15 RX ORDER — VALSARTAN 160 MG/1
160 TABLET ORAL 2 TIMES DAILY
COMMUNITY
Start: 2023-06-26

## 2023-11-15 RX ORDER — SPIRONOLACTONE 50 MG/1
25 TABLET, FILM COATED ORAL DAILY
Status: DISCONTINUED | OUTPATIENT
Start: 2023-11-15 | End: 2023-11-16 | Stop reason: HOSPADM

## 2023-11-15 RX ORDER — POLYETHYLENE GLYCOL 3350 17 G/17G
1 POWDER, FOR SOLUTION ORAL
Status: DISCONTINUED | OUTPATIENT
Start: 2023-11-15 | End: 2023-11-16 | Stop reason: HOSPADM

## 2023-11-15 RX ORDER — LIDOCAINE HYDROCHLORIDE 20 MG/ML
20 INJECTION, SOLUTION INFILTRATION; PERINEURAL ONCE
Status: COMPLETED | OUTPATIENT
Start: 2023-11-15 | End: 2023-11-15

## 2023-11-15 RX ORDER — INSULIN LISPRO 100 [IU]/ML
2-9 INJECTION, SOLUTION INTRAVENOUS; SUBCUTANEOUS
Status: DISCONTINUED | OUTPATIENT
Start: 2023-11-15 | End: 2023-11-16

## 2023-11-15 RX ORDER — SODIUM CHLORIDE, SODIUM LACTATE, POTASSIUM CHLORIDE, CALCIUM CHLORIDE 600; 310; 30; 20 MG/100ML; MG/100ML; MG/100ML; MG/100ML
1000 INJECTION, SOLUTION INTRAVENOUS ONCE
Status: COMPLETED | OUTPATIENT
Start: 2023-11-15 | End: 2023-11-15

## 2023-11-15 RX ORDER — AMPICILLIN AND SULBACTAM 2; 1 G/1; G/1
INJECTION, POWDER, FOR SOLUTION INTRAMUSCULAR; INTRAVENOUS
Status: COMPLETED
Start: 2023-11-15 | End: 2023-11-15

## 2023-11-15 RX ORDER — BISACODYL 10 MG
10 SUPPOSITORY, RECTAL RECTAL
Status: DISCONTINUED | OUTPATIENT
Start: 2023-11-15 | End: 2023-11-16 | Stop reason: HOSPADM

## 2023-11-15 RX ORDER — ENOXAPARIN SODIUM 100 MG/ML
40 INJECTION SUBCUTANEOUS DAILY
Status: DISCONTINUED | OUTPATIENT
Start: 2023-11-15 | End: 2023-11-16 | Stop reason: HOSPADM

## 2023-11-15 RX ORDER — SODIUM CHLORIDE 9 MG/ML
INJECTION, SOLUTION INTRAVENOUS CONTINUOUS
Status: DISCONTINUED | OUTPATIENT
Start: 2023-11-15 | End: 2023-11-15

## 2023-11-15 RX ORDER — ONDANSETRON 2 MG/ML
INJECTION INTRAMUSCULAR; INTRAVENOUS
Status: COMPLETED
Start: 2023-11-15 | End: 2023-11-15

## 2023-11-15 RX ORDER — MIDAZOLAM HYDROCHLORIDE 1 MG/ML
INJECTION INTRAMUSCULAR; INTRAVENOUS
Status: COMPLETED
Start: 2023-11-15 | End: 2023-11-15

## 2023-11-15 RX ORDER — ATORVASTATIN CALCIUM 40 MG/1
40 TABLET, FILM COATED ORAL EVERY EVENING
Status: DISCONTINUED | OUTPATIENT
Start: 2023-11-15 | End: 2023-11-16 | Stop reason: HOSPADM

## 2023-11-15 RX ORDER — ASPIRIN 81 MG/1
81 TABLET ORAL DAILY
Status: DISCONTINUED | OUTPATIENT
Start: 2023-11-15 | End: 2023-11-16 | Stop reason: HOSPADM

## 2023-11-15 RX ADMIN — AMPICILLIN AND SULBACTAM 3 G: 1; 2 INJECTION, POWDER, FOR SOLUTION INTRAMUSCULAR; INTRAVENOUS at 12:15

## 2023-11-15 RX ADMIN — ATORVASTATIN CALCIUM 40 MG: 40 TABLET, FILM COATED ORAL at 17:08

## 2023-11-15 RX ADMIN — INSULIN LISPRO 6 UNITS: 100 INJECTION, SOLUTION INTRAVENOUS; SUBCUTANEOUS at 12:20

## 2023-11-15 RX ADMIN — INSULIN GLARGINE-YFGN 19 UNITS: 100 INJECTION, SOLUTION SUBCUTANEOUS at 17:12

## 2023-11-15 RX ADMIN — SPIRONOLACTONE 25 MG: 50 TABLET ORAL at 08:42

## 2023-11-15 RX ADMIN — AMPICILLIN AND SULBACTAM 3 G: 1; 2 INJECTION, POWDER, FOR SOLUTION INTRAMUSCULAR; INTRAVENOUS at 17:11

## 2023-11-15 RX ADMIN — VANCOMYCIN HYDROCHLORIDE 2250 MG: 5 INJECTION, POWDER, LYOPHILIZED, FOR SOLUTION INTRAVENOUS at 06:17

## 2023-11-15 RX ADMIN — METOPROLOL SUCCINATE 100 MG: 100 TABLET, EXTENDED RELEASE ORAL at 08:38

## 2023-11-15 RX ADMIN — MIDAZOLAM HYDROCHLORIDE 5 MG: 1 INJECTION, SOLUTION INTRAMUSCULAR; INTRAVENOUS at 04:31

## 2023-11-15 RX ADMIN — LIDOCAINE HYDROCHLORIDE 20 ML: 20 INJECTION, SOLUTION INFILTRATION; PERINEURAL at 04:00

## 2023-11-15 RX ADMIN — SODIUM CHLORIDE: 9 INJECTION, SOLUTION INTRAVENOUS at 05:30

## 2023-11-15 RX ADMIN — ENOXAPARIN SODIUM 40 MG: 100 INJECTION SUBCUTANEOUS at 17:08

## 2023-11-15 RX ADMIN — AMPICILLIN AND SULBACTAM 3 G: 1; 2 INJECTION, POWDER, FOR SOLUTION INTRAMUSCULAR; INTRAVENOUS at 05:33

## 2023-11-15 RX ADMIN — INSULIN LISPRO 5 UNITS: 100 INJECTION, SOLUTION INTRAVENOUS; SUBCUTANEOUS at 20:47

## 2023-11-15 RX ADMIN — VALSARTAN 160 MG: 80 TABLET ORAL at 17:20

## 2023-11-15 RX ADMIN — ONDANSETRON 4 MG: 2 INJECTION INTRAMUSCULAR; INTRAVENOUS at 04:30

## 2023-11-15 RX ADMIN — ASPIRIN 81 MG: 81 TABLET, COATED ORAL at 09:46

## 2023-11-15 RX ADMIN — METOPROLOL SUCCINATE 100 MG: 100 TABLET, EXTENDED RELEASE ORAL at 17:20

## 2023-11-15 RX ADMIN — INSULIN LISPRO 5 UNITS: 100 INJECTION, SOLUTION INTRAVENOUS; SUBCUTANEOUS at 17:14

## 2023-11-15 RX ADMIN — VANCOMYCIN HYDROCHLORIDE 1500 MG: 5 INJECTION, POWDER, LYOPHILIZED, FOR SOLUTION INTRAVENOUS at 17:57

## 2023-11-15 RX ADMIN — SODIUM CHLORIDE, POTASSIUM CHLORIDE, SODIUM LACTATE AND CALCIUM CHLORIDE 1000 ML: 600; 310; 30; 20 INJECTION, SOLUTION INTRAVENOUS at 03:52

## 2023-11-15 RX ADMIN — IOHEXOL 100 ML: 350 INJECTION, SOLUTION INTRAVENOUS at 17:00

## 2023-11-15 ASSESSMENT — LIFESTYLE VARIABLES
DOES PATIENT WANT TO STOP DRINKING: NO
AVERAGE NUMBER OF DAYS PER WEEK YOU HAVE A DRINK CONTAINING ALCOHOL: 0
ON A TYPICAL DAY WHEN YOU DRINK ALCOHOL HOW MANY DRINKS DO YOU HAVE: 0
EVER HAD A DRINK FIRST THING IN THE MORNING TO STEADY YOUR NERVES TO GET RID OF A HANGOVER: NO
HAVE PEOPLE ANNOYED YOU BY CRITICIZING YOUR DRINKING: NO
CONSUMPTION TOTAL: NEGATIVE
EVER FELT BAD OR GUILTY ABOUT YOUR DRINKING: NO
ALCOHOL_USE: NO
TOTAL SCORE: 0
TOTAL SCORE: 0
HOW MANY TIMES IN THE PAST YEAR HAVE YOU HAD 5 OR MORE DRINKS IN A DAY: 0
HAVE YOU EVER FELT YOU SHOULD CUT DOWN ON YOUR DRINKING: NO
TOTAL SCORE: 0

## 2023-11-15 ASSESSMENT — ENCOUNTER SYMPTOMS
HEADACHES: 0
FEVER: 0
SPUTUM PRODUCTION: 0
NAUSEA: 0
VOMITING: 0
DIZZINESS: 0
SENSORY CHANGE: 0
PALPITATIONS: 0
DIARRHEA: 0
BRUISES/BLEEDS EASILY: 0
NECK PAIN: 0
CHILLS: 0
CLAUDICATION: 0
TINGLING: 0
ABDOMINAL PAIN: 0
SHORTNESS OF BREATH: 0
BACK PAIN: 0
HEMOPTYSIS: 0
ORTHOPNEA: 0
DEPRESSION: 0
TREMORS: 0

## 2023-11-15 ASSESSMENT — FIBROSIS 4 INDEX
FIB4 SCORE: 0.35
FIB4 SCORE: 0.36

## 2023-11-15 ASSESSMENT — PAIN DESCRIPTION - PAIN TYPE
TYPE: ACUTE PAIN
TYPE: ACUTE PAIN
TYPE: ACUTE PAIN;SURGICAL PAIN
TYPE: ACUTE PAIN
TYPE: ACUTE PAIN

## 2023-11-15 NOTE — ED NOTES
Pt able to ambulate to [facility] from the lobby with steady gait. Pt provided with gown, pt provided urine sample upon arrival. Chart up for ERP.

## 2023-11-15 NOTE — ED NOTES
Pharmacy Medication Reconciliation      ~Medication reconciliation updated and complete per patient AND home pharmacy.    ~Allergies have been verified and updated   ~DOXYCYCLINE 100MG BID, LAST DOSE 11/14.  ~Patient home pharmacy :  [facility]      ~Anticoagulants (rivaroxaban, apixaban, edoxaban, dabigatran, warfarin, enoxaparin) taken in the last 14 days? NO  ~Anticoagulant: N/A Last dose: N/A

## 2023-11-15 NOTE — ASSESSMENT & PLAN NOTE
Poorly controlled diabetes mellitus  A1c 15.4 on metformin 1000mg BID at home  Hold metformin  Insulin sliding scale and long acting  Consider nutritional if persistently elevated  Diabetic diet  Consult diabetic edu for home supply recs    Will need to be dc home on insulin  Dm educator consulted.

## 2023-11-15 NOTE — ED NOTES
Called lab to check status of lactic acid results. Per lab lactic acid is in process but the machine that runs the test is down.

## 2023-11-15 NOTE — ED NOTES
Bedside report received from Adrian PEREZ. Pt connected to pulse ox, and automatic BP. Fall precautions in place. Call light within reach. Pt connected to 2L O2 via NC

## 2023-11-15 NOTE — ED NOTES
Patient appears to be sleeping, respirations even and unlabored. No signs/symptoms of acute distress or pain noted. Call light within reach

## 2023-11-15 NOTE — H&P
Hospital Medicine History & Physical Note    Date of Service  11/15/2023    Primary Care Physician  Pcp Not In Computer      Code Status  Full Code    Chief Complaint  Chief Complaint   Patient presents with    Abscess     X 1 week       History of Presenting Illness  Mina Burch is a 38 y.o. male with past medical history of hypertension, CAD status post stent placement, STEMI, ischemic cardiomyopathy with ejection fraction of 20% who presented 11/15/2023 with scrotal pain and swelling.  Of note, patient has a history of recurrent scrotal abscesses.  He was last seen in outside facility approximately week ago where he had the abscess drained, was sent home on oral antibiotics.  Patient states that he has noticed some worsening pain and swelling on the right side of his scrotum for the last few days.  Denies any fevers, chills, nausea, vomiting.  In the emergency department, CT imaging was obtained showing right-sided scrotal cellulitis with 3 cm abscess. Incision and drainage was performed in the emergency department by the ERP. Patient will be admitted for medical management of scrotal abscess.     I discussed the plan of care with patient.    Review of Systems  Review of Systems   Constitutional:  Negative for chills and fever.   HENT:  Negative for congestion, ear discharge, ear pain and nosebleeds.    Respiratory:  Negative for hemoptysis, sputum production and shortness of breath.    Cardiovascular:  Negative for chest pain, palpitations, orthopnea, claudication and leg swelling.   Gastrointestinal:  Negative for abdominal pain, diarrhea, nausea and vomiting.   Genitourinary:  Negative for dysuria, frequency, hematuria and urgency.        Scrotal swelling and pain    Musculoskeletal:  Negative for back pain and neck pain.   Neurological:  Negative for dizziness, tingling, tremors, sensory change and headaches.   Endo/Heme/Allergies:  Negative for environmental allergies. Does not bruise/bleed  easily.   Psychiatric/Behavioral:  Negative for depression and suicidal ideas.        Past Medical History   has a past medical history of Diabetes ([facility]), Hyperlipidemia, Hypertension, MI (myocardial infarction) ([facility]) (2017), and Myocardial infarct ([facility]) (11/2018).    Surgical History   has a past surgical history that includes other cardiac surgery (11/2018) and other cardiac surgery (09/18/2017).     Family History  family history includes Cancer in his maternal grandfather; Diabetes in his mother; Heart Attack (age of onset: 45) in his father; Heart Disease in his father; Hypertension in his father and mother; No Known Problems in his daughter, paternal grandfather, paternal grandmother, and son.   Family history reviewed with patient. There is no family history that is pertinent to the chief complaint.     Social History   reports that he quit smoking about 3 years ago. His smoking use included cigarettes. He started smoking about 17 years ago. He has a 7.0 pack-year smoking history. He quit smokeless tobacco use about 4 years ago.  His smokeless tobacco use included chew. He reports that he does not currently use alcohol. He reports that he does not currently use drugs after having used the following drugs: Marijuana.    Allergies  No Known Allergies    Medications  Prior to Admission Medications   Prescriptions Last Dose Informant Patient Reported? Taking?   Colchicine 0.6 MG Cap   No No   Sig: Take 0.6 mg by mouth every day. Take 2 tabs now, and in one hour take 1 tab. Then take 1 tab daily for 1 days.   albuterol 108 (90 Base) MCG/ACT Aero Soln inhalation aerosol   No No   Sig: Inhale 2 Puffs every 6 hours as needed for Shortness of Breath.   allopurinol (ZYLOPRIM) 100 MG Tab   No No   Sig: Take 1 Tab by mouth every day.   Patient not taking: Reported on 12/15/2020   aspirin 81 MG EC tablet   No No   Sig: Take 1 Tab by mouth every day.   atorvastatin (LIPITOR) 40 MG Tab   No No   Sig: Take 1 Tab by mouth  every evening.   clopidogrel (PLAVIX) 75 MG Tab   No No   Sig: Take 1 Tab by mouth every day.   colchicine (COLCRYS) 0.6 MG Tab   No No   Sig: Take 1 Tab by mouth every day. For gout flair 1.2mg initially may repeat in 1 hour.  Max dosage is 1.8 mg in 24 hours do not retreat for 3 days.   Patient not taking: Reported on 12/15/2020   isosorbide dinitrate (ISORDIL) 10 MG Tab   No No   Sig: Take 0.5 Tabs by mouth 2 times a day as needed (chest pain).   losartan (COZAAR) 100 MG Tab   No No   Sig: Take 1 Tab by mouth every day.   metFORMIN (GLUCOPHAGE) 500 MG Tab   No No   Sig: TAKE 1 TABLET BY MOUTH TWICE DAILY   metoprolol SR (TOPROL XL) 100 MG TABLET SR 24 HR   No No   Sig: Take 1 Tab by mouth 2 Times a Day.   rivaroxaban (XARELTO) 20 MG Tab tablet   No No   Sig: Take 1 Tab by mouth with dinner.   spironolactone (ALDACTONE) 25 MG Tab   No No   Sig: TAKE 1 TABLET BY MOUTH EVERY DAY      Facility-Administered Medications: None       Physical Exam  Temp:  [36.3 °C (97.3 °F)-36.7 °C (98.1 °F)] 36.7 °C (98.1 °F)  Pulse:  [64-78] 66  Resp:  [14-18] 14  BP: (130-155)/() 141/81  SpO2:  [94 %-96 %] 94 %  Blood Pressure: (!) 141/81   Temperature: 36.7 °C (98.1 °F)   Pulse: 66   Respiration: 14   Pulse Oximetry: 94 %       Physical Exam  Constitutional:       General: He is not in acute distress.     Appearance: Normal appearance. He is normal weight. He is not ill-appearing, toxic-appearing or diaphoretic.   HENT:      Head: Normocephalic and atraumatic.      Mouth/Throat:      Mouth: Mucous membranes are moist.   Eyes:      Pupils: Pupils are equal, round, and reactive to light.   Cardiovascular:      Rate and Rhythm: Normal rate and regular rhythm.      Pulses: Normal pulses.      Heart sounds: Normal heart sounds. No murmur heard.     No friction rub. No gallop.   Pulmonary:      Effort: Pulmonary effort is normal. No respiratory distress.      Breath sounds: Normal breath sounds. No stridor. No wheezing, rhonchi or  "rales.   Chest:      Chest wall: No tenderness.   Abdominal:      General: There is no distension.      Palpations: There is no mass.      Tenderness: There is no abdominal tenderness. There is no right CVA tenderness, left CVA tenderness, guarding or rebound.      Hernia: No hernia is present.   Genitourinary:     Comments: Scrotal swelling and surrounding erythema.  There is tenderness to palpation.    Musculoskeletal:         General: No swelling, tenderness, deformity or signs of injury.      Right lower leg: No edema.      Left lower leg: No edema.   Skin:     General: Skin is warm and dry.      Capillary Refill: Capillary refill takes less than 2 seconds.      Coloration: Skin is not jaundiced or pale.      Findings: No bruising, erythema, lesion or rash.   Neurological:      General: No focal deficit present.      Mental Status: He is alert and oriented to person, place, and time. Mental status is at baseline.      Cranial Nerves: No cranial nerve deficit.      Sensory: No sensory deficit.      Motor: No weakness.      Coordination: Coordination normal.   Psychiatric:         Mood and Affect: Mood normal.         Laboratory:  Recent Labs     11/15/23  0211   WBC 12.3*   RBC 5.01   HEMOGLOBIN 14.4   HEMATOCRIT 43.0   MCV 85.8   MCH 28.7   MCHC 33.5   RDW 37.1   PLATELETCT 353   MPV 9.4     Recent Labs     11/15/23  0211   SODIUM 133*   POTASSIUM 4.1   CHLORIDE 99   CO2 22   GLUCOSE 465*   BUN 21   CREATININE 1.13   CALCIUM 8.7     Recent Labs     11/15/23  0211   ALTSGPT 28   ASTSGOT 17   ALKPHOSPHAT 69   TBILIRUBIN 0.4   GLUCOSE 465*         No results for input(s): \"NTPROBNP\" in the last 72 hours.      No results for input(s): \"TROPONINT\" in the last 72 hours.    Imaging:  CT-PELVIS WITH   Final Result         1. Right scrotal wall cellulitis and 3 cm scrotal wall abscess.   2. No evidence of Mary Grace's gangrene. No soft tissue air.          X-Ray:  I have personally reviewed the images and compared with " prior images.  EKG:  I have personally reviewed the images and compared with prior images.    Assessment/Plan:  Justification for Admission Status  I anticipate this patient will require at least two midnights for appropriate medical management, necessitating inpatient admission because scrotal abscess and surrounding cellulitis requiring IV antibiotics    Patient will need a Med/Surg bed on MEDICAL service .  The need is secondary to scrotal abscess.    * Scrotal abscess- (present on admission)  Assessment & Plan  History of recurrent scrotal abscessed  CT imaging: Right scrotal wall cellulitis and 3 cm scrotal wall abscess.   ERP drained the abscess in the ER  Follow up cultures  Continue with Vanc and Unasyn      Ischemic cardiomyopathy- (present on admission)  Assessment & Plan  Last echocardiogram done in 2020 showed an ejection fraction of 20%.  Continue home dose metoprolol, losartan, Aldactone.    Coronary artery disease involving native coronary artery of native heart with angina pectoris (HCC)- (present on admission)  Assessment & Plan  The ASCVD Risk score (Asher JUNIOR, et al., 2019) failed to calculate for the following reasons:    The 2019 ASCVD risk score is only valid for ages 40 to 79    The patient has a prior MI or stroke diagnosis  Continue plavix and xarelto     Type 2 diabetes mellitus with diabetic neuropathy, without long-term current use of insulin (HCC)- (present on admission)  Assessment & Plan  Poorly controlled diabetes mellitus  Insulin sliding scale  Diabetic diet  Follow up A1c    Essential hypertension- (present on admission)  Assessment & Plan  Continue with isosorbide dinitrate, losartan, metoprolol, Aldactone        VTE prophylaxis: therapeutic anticoagulation with xarelto

## 2023-11-15 NOTE — ED NOTES
Phlebotomy at bedside for blood culture set #1 and labwork.     Paramedic student Benito under RN supervision establishing IV with this RN drawing second set of cultures.

## 2023-11-15 NOTE — ED NOTES
MD at bedside with RN and paramedic student for I and D of scrotal abscess. Pt tolerated procedure well. Pt placed on 2L NC prior to versed administration. Wound cultures obtained by MD and sent.

## 2023-11-15 NOTE — ED TRIAGE NOTES
"  Chief Complaint   Patient presents with    Abscess     X 1 week     Pt self transferred from [facility] for scrotal abscess x 1 week. Pt states he had a scrotal abscess drained last week but pain and swelling worsened today prompting return to ED. Pt denies fevers, but endorses pain when urinating. Rates current pain 9/10.      Pt is alert/oriented, following commands, and answering questions appropriately. Pt placed in lobby and educated on triage process. Pt encouraged to alert staff for any changes in condition.    BP (!) 155/105   Pulse 72   Temp 36.3 °C (97.3 °F) (Temporal)   Resp 16   Ht 1.626 m (5' 4\")   Wt 94.6 kg (208 lb 8.9 oz)   SpO2 96%   BMI 35.80 kg/m²       "

## 2023-11-15 NOTE — ASSESSMENT & PLAN NOTE
History of recurrent scrotal abscessed  CT imaging: Right scrotal wall cellulitis and 3 cm scrotal wall abscess.   ERP drained the abscess in the ER  Follow up cultures  Continue with Vanc and Unasyn    Fu culture

## 2023-11-15 NOTE — PROGRESS NOTES
"Pharmacy Vancomycin Kinetics Note for 11/15/2023     38 y.o. male on Vancomycin day # 1     Vancomycin Indication (AUC Dosing): Skin/skin structure infection    Provider specified end date: 11/20/23    Active Antibiotics (From admission, onward)      Ordered     Ordering Provider       Wed Nov 15, 2023  5:16 AM    11/15/23 0516  vancomycin (Vancocin) 1,500 mg in  mL IVPB  (vancomycin (VANCOCIN) IV (LD + Maintenance))  EVERY 12 HOURS         William Adkins M.D.       Wed Nov 15, 2023  5:08 AM    11/15/23 0508  vancomycin (Vancocin) 2,250 mg in  mL IVPB  (vancomycin (VANCOCIN) IV (LD + Maintenance))  ONCE         William Adkins M.D.       Wed Nov 15, 2023  4:55 AM    11/15/23 0455  ampicillin/sulbactam (Unasyn) 3 g in  mL IVPB  EVERY 6 HOURS         William Adkins M.D.    11/15/23 0455  MD Alert...Vancomycin per Pharmacy  PHARMACY TO DOSE        Question:  Indication(s) for vancomycin?  Answer:  Skin and soft tissue infection    William Adkins M.D.            Dosing Weight: 94.6 kg (208 lb 8.9 oz)      Admission History: Admitted on 11/15/2023 for Scrotal abscess [N49.2]  Pertinent history: Patient scrotal abscess x 1 week. Pt states he had a scrotal abscess drained last week but pain and swelling worsened today prompting return to ED. Pt denies fevers, but endorses pain when urinating. CT here showing right scrotal wall cellulitis and 3 cm scrotal wall abscess.    Allergies:     Patient has no known allergies.     Pertinent cultures to date:     Results       Procedure Component Value Units Date/Time    MRSA By PCR (Amp) [[id number]]     Order Status: Sent Specimen: Respirate from Nares     CULTURE WOUND W/ GRAM STAIN [[id number]]     Order Status: Sent Specimen: Wound from Abscess     BLOOD CULTURE [[id number]] Collected: 11/15/23 0214    Order Status: Sent Specimen: Blood from Peripheral Updated: 11/15/23 0233    Narrative:      Per Hospital Policy: Only change Specimen Src: to \"Line\" " "if  specified by physician order.  Release to patient->Immediate    BLOOD CULTURE [[id number]] Collected: 11/15/23 0211    Order Status: Sent Specimen: Blood from Peripheral Updated: 11/15/23 0232    Narrative:      Per Hospital Policy: Only change Specimen Src: to \"Line\" if  specified by physician order.  Release to patient->Immediate    URINE CULTURE(NEW) [[id number]] Collected: 11/15/23 0140    Order Status: Sent Specimen: Urine Updated: 11/15/23 0211    Narrative:      Indication for culture:->Emergency Room Patient  Release to patient->Immediate    URINALYSIS [[id number]]  (Abnormal) Collected: 11/15/23 0140    Order Status: Completed Specimen: Urine Updated: 11/15/23 0209     Color Yellow     Character Clear     Specific Gravity 1.028     Ph 5.5     Glucose >=1000 mg/dL      Ketones Negative mg/dL      Protein Negative mg/dL      Bilirubin Negative     Urobilinogen, Urine 0.2     Nitrite Negative     Leukocyte Esterase Negative     Occult Blood Negative     Micro Urine Req see below     Comment: Microscopic examination not performed when specimen is clear  and chemically negative for protein, blood, leukocyte esterase  and nitrite.         Narrative:      Indication for culture:->Emergency Room Patient  Release to patient->Immediate            Labs:     Estimated Creatinine Clearance: 92 mL/min (by C-G formula based on SCr of 1.13 mg/dL).  Recent Labs     11/15/23  0211   WBC 12.3*   NEUTSPOLYS 57.20     Recent Labs     11/15/23  0211   BUN 21   CREATININE 1.13   ALBUMIN 3.9       Intake/Output Summary (Last 24 hours) at 11/15/2023 0516  Last data filed at 11/15/2023 0444  Gross per 24 hour   Intake 1000 ml   Output --   Net 1000 ml      BP (!) 141/81   Pulse 66   Temp 36.7 °C (98.1 °F) (Temporal)   Resp 14   Ht 1.626 m (5' 4\")   Wt 94.6 kg (208 lb 8.9 oz)   SpO2 94%  Temp (24hrs), Av.5 °C (97.7 °F), Min:36.3 °C (97.3 °F), Max:36.7 °C (98.1 °F)      List concerns for Vancomycin clearance: "     Obesity    Pharmacokinetics:     AUC kinetics:   Ke (hr ^-1): 0.0808 hr^-1  Half life: 8.58 hr  Clearance: 6.725  Estimated TDD: 3362.5  Estimated Dose: 1485  Estimated interval: 10.6      A/P:     -  Vancomycin dose: Will load patient with vancomycin 2,250mg (25mg/kg) once. Will then proceed with 1,500mg (15mg/kg) IV vancomycin q12h thereafter.       -  Next vancomycin level(s): To be obtained at steady state as determined by clinical pharmacist.     -  Predicted vancomycin AUC from initial AUC test calculator: 446 mg·hr/L    -  Comments: Vancomycin dose based on age, weight, and renal function (which appears at baseline). Renal function indices overall stable. Predicted AUC from crass equation (utilized due to high BMI) is 466 mg·hr/L (goal 400mg·hr/L - 600mg·hr/L). MRSA nares ordered and pending.       Anat Frausto, PharmD

## 2023-11-15 NOTE — ED NOTES
Bedside report to Sandy PEREZ. Pt connected to pulse ox, and automatic BP. Fall precautions in place. Call light within reach. Pt connected to 2L O2 via NC.

## 2023-11-15 NOTE — ED PROVIDER NOTES
ED Provider Note    CHIEF COMPLAINT  Chief Complaint   Patient presents with    Abscess     X 1 week       EXTERNAL RECORDS REVIEWED  External ED Note from transferring facility for treatment of right-sided scrotal abscess    Also reviewed previous notes patient has history of ischemic cardiomyopathy previous MI and drug-eluting stents    HPI/ROS  LIMITATION TO HISTORY     OUTSIDE HISTORIAN(S):      Mina Burch is a 38 y.o. male who presents to the emergency department for scrotal abscess.  Patient presented to outside facility 7 days prior and had drainage of right-sided scrotal abscess.  States that he was initially doing better at that time however over the last 7 days he has had increase of what he describes as a second lesion that was present at the time of the first lesion.  He has had moderate pain in this area has had drainage from the previous incision and drainage site.  He has had no fevers no chills no nausea no vomiting.  No previous diagnosis of diabetes but did have hyperglycemia in the 300s tonight.    PAST MEDICAL HISTORY   has a past medical history of Diabetes ([facility]), Hyperlipidemia, Hypertension, MI (myocardial infarction) ([facility]) (2017), and Myocardial infarct ([facility]) (11/2018).    SURGICAL HISTORY   has a past surgical history that includes other cardiac surgery (11/2018) and other cardiac surgery (09/18/2017).    FAMILY HISTORY  Family History   Problem Relation Age of Onset    Heart Disease Father     Hypertension Father     Heart Attack Father 45    Diabetes Mother     Hypertension Mother     Cancer Maternal Grandfather         Brain     No Known Problems Paternal Grandmother     No Known Problems Paternal Grandfather     No Known Problems Son     No Known Problems Daughter        SOCIAL HISTORY  Social History     Tobacco Use    Smoking status: Former     Current packs/day: 0.00     Average packs/day: 0.5 packs/day for 14.0 years (7.0 ttl pk-yrs)     Types: Cigarettes     Start date:  "7/21/2006     Quit date: 7/21/2020     Years since quitting: 3.3    Smokeless tobacco: Former     Types: Chew     Quit date: 12/2018   Vaping Use    Vaping Use: Never used   Substance and Sexual Activity    Alcohol use: Not Currently    Drug use: Not Currently     Types: Marijuana     Comment: former    Sexual activity: Never     Partners: Female     Comment:         CURRENT MEDICATIONS  Home Medications       Reviewed by Francheska Justice R.N. (Registered Nurse) on 11/15/23 at 0127  Med List Status: Not Addressed     Medication Last Dose Status   albuterol 108 (90 Base) MCG/ACT Aero Soln inhalation aerosol  Active   allopurinol (ZYLOPRIM) 100 MG Tab  Active   aspirin 81 MG EC tablet  Active   atorvastatin (LIPITOR) 40 MG Tab  Active   clopidogrel (PLAVIX) 75 MG Tab  Active   colchicine (COLCRYS) 0.6 MG Tab  Active   Colchicine 0.6 MG Cap  Active   isosorbide dinitrate (ISORDIL) 10 MG Tab  Active   losartan (COZAAR) 100 MG Tab  Active   metFORMIN (GLUCOPHAGE) 500 MG Tab  Active   metoprolol SR (TOPROL XL) 100 MG TABLET SR 24 HR  Active   rivaroxaban (XARELTO) 20 MG Tab tablet  Active   spironolactone (ALDACTONE) 25 MG Tab  Active                    ALLERGIES  No Known Allergies      PHYSICAL EXAM  VITAL SIGNS: BP (!) 155/105   Pulse 72   Temp 36.3 °C (97.3 °F) (Temporal)   Resp 16   Ht 1.626 m (5' 4\")   Wt 94.6 kg (208 lb 8.9 oz)   SpO2 96%   BMI 35.80 kg/m²    Pulse ox interpretation: I interpret this pulse ox as normal.  Constitutional: Alert and oriented x 3, moderate distress  HEENT: Atraumatic normocephalic, pupils are equal round reactive to light extraocular movements are intact. The nares is clear, external ears are normal, mouth shows moist mucous membranes normal dentition for age  Neck: Supple, no JVD no tracheal deviation  Cardiovascular: Regular rate and rhythm no murmur rub or gallop 2+ pulses peripherally x4  Thorax & Lungs: No respiratory distress, no wheezes rales or rhonchi, No chest " tenderness.   GI: Soft nontender nondistended positive bowel sounds, no peritoneal signs  : Patient has previous stab incision over the base of the right hemiscrotum.  Patient has secondary lesion that is approximately 4 cm erythematous indurated with central fluctuance over the right hemiscrotum no active drainage at this time  Skin: Warm dry no acute rash or lesion  Musculoskeletal: Moving all extremities with full range and 5 of 5 strength no acute  deformity  Neurologic: Cranial nerves III through XII are grossly intact no sensory deficit no cerebellar dysfunction   Psychiatric: Appropriate affect for situation at this time         DIAGNOSTIC STUDIES / PROCEDURES  Results for orders placed or performed during the hospital encounter of 11/15/23   LACTIC ACID   Result Value Ref Range    Lactic Acid 1.6 0.5 - 2.0 mmol/L   LACTIC ACID   Result Value Ref Range    Lactic Acid 1.5 0.5 - 2.0 mmol/L   CBC WITH DIFFERENTIAL   Result Value Ref Range    WBC 12.3 (H) 4.8 - 10.8 K/uL    RBC 5.01 4.70 - 6.10 M/uL    Hemoglobin 14.4 14.0 - 18.0 g/dL    Hematocrit 43.0 42.0 - 52.0 %    MCV 85.8 81.4 - 97.8 fL    MCH 28.7 27.0 - 33.0 pg    MCHC 33.5 32.3 - 36.5 g/dL    RDW 37.1 35.9 - 50.0 fL    Platelet Count 353 164 - 446 K/uL    MPV 9.4 9.0 - 12.9 fL    Neutrophils-Polys 57.20 44.00 - 72.00 %    Lymphocytes 30.50 22.00 - 41.00 %    Monocytes 6.80 0.00 - 13.40 %    Eosinophils 4.30 0.00 - 6.90 %    Basophils 0.70 0.00 - 1.80 %    Immature Granulocytes 0.50 0.00 - 0.90 %    Nucleated RBC 0.00 0.00 - 0.20 /100 WBC    Neutrophils (Absolute) 7.03 1.82 - 7.42 K/uL    Lymphs (Absolute) 3.74 1.00 - 4.80 K/uL    Monos (Absolute) 0.83 0.00 - 0.85 K/uL    Eos (Absolute) 0.53 (H) 0.00 - 0.51 K/uL    Baso (Absolute) 0.09 0.00 - 0.12 K/uL    Immature Granulocytes (abs) 0.06 0.00 - 0.11 K/uL    NRBC (Absolute) 0.00 K/uL   COMP METABOLIC PANEL   Result Value Ref Range    Sodium 133 (L) 135 - 145 mmol/L    Potassium 4.1 3.6 - 5.5 mmol/L     Chloride 99 96 - 112 mmol/L    Co2 22 20 - 33 mmol/L    Anion Gap 12.0 7.0 - 16.0    Glucose 465 (H) 65 - 99 mg/dL    Bun 21 8 - 22 mg/dL    Creatinine 1.13 0.50 - 1.40 mg/dL    Calcium 8.7 8.5 - 10.5 mg/dL    Correct Calcium 8.8 8.5 - 10.5 mg/dL    AST(SGOT) 17 12 - 45 U/L    ALT(SGPT) 28 2 - 50 U/L    Alkaline Phosphatase 69 30 - 99 U/L    Total Bilirubin 0.4 0.1 - 1.5 mg/dL    Albumin 3.9 3.2 - 4.9 g/dL    Total Protein 6.4 6.0 - 8.2 g/dL    Globulin 2.5 1.9 - 3.5 g/dL    A-G Ratio 1.6 g/dL   URINALYSIS    Specimen: Urine   Result Value Ref Range    Color Yellow     Character Clear     Specific Gravity 1.028 <1.035    Ph 5.5 5.0 - 8.0    Glucose >=1000 (A) Negative mg/dL    Ketones Negative Negative mg/dL    Protein Negative Negative mg/dL    Bilirubin Negative Negative    Urobilinogen, Urine 0.2 Negative    Nitrite Negative Negative    Leukocyte Esterase Negative Negative    Occult Blood Negative Negative    Micro Urine Req see below    ESTIMATED GFR   Result Value Ref Range    GFR (CKD-EPI) 85 >60 mL/min/1.73 m 2   Lipid Profile   Result Value Ref Range    Cholesterol,Tot 111 100 - 199 mg/dL    Triglycerides 189 (H) 0 - 149 mg/dL    HDL 25 (A) >=40 mg/dL    LDL 48 <100 mg/dL   POCT glucose device results   Result Value Ref Range    POC Glucose, Blood 345 (H) 65 - 99 mg/dL         RADIOLOGY:  I have independently interpreted the diagnostic imaging associated with this visit and am waiting the final reading from the radiologist.   My preliminary interpretation is as follows:       Radiologist interpretation:     CT-PELVIS WITH   Final Result         1. Right scrotal wall cellulitis and 3 cm scrotal wall abscess.   2. No evidence of Mary Grace's gangrene. No soft tissue air.          COURSE & MEDICAL DECISION MAKING      Incision and Drainage Procedure Note    Indication: Abscess    Procedure: The patient was positioned appropriately and the skin over the incision site was prepped with betadine and draped in a  sterile fashion. Local anesthesia was obtained by infiltration using 1% Lidocaine without epinephrine.  2 opposing stab incisions were made at the borders of the fluctuance, hemostat was placed through the drainage cavity and blue maxi vessel loop was pulled through the abscess cavity to maintain drainage tract.  Large amount of purulent foul-smelling material was expressed.  Loculations were broken up and more material was expressed.  The drainage tract was then irrigated with copious normal saline and suction.      The patient tolerated the procedure well.    Complications: None      ED Observation Status? No; Patient does not meet criteria for ED Observation.     ASSESSMENT, COURSE AND PLAN  Care Narrative: D8-year-old male with multiple chronic medical concerns including hypertension coronary artery disease and what he states is waxing and waning diabetes.  Patient has a blood sugar of 465 this evening with active infection in the scrotum.  Abscess was drained as above.  CT was obtained which shows no evidence of Mary Grace's gangrene.  Patient was given a liter of LR was given 5 units of IV insulin patient been given Vanco and Unasyn at the outside facility which is likely appropriate coverage however abscess material was sent for culture.  I discussed case with hospitalist .  Patient admitted for ongoing evaluation and treatment in guarded condition.    FINAL DIAGNOSIS  1.  Scrotal abscess  2.  Incision and drainage by ERP  3.  Scrotal cellulitis  4.  Hyperglycemia       Electronically signed by: Cristobal Benoit M.D., 11/15/2023 1:56 AM

## 2023-11-15 NOTE — ASSESSMENT & PLAN NOTE
Last echocardiogram done in 2020 showed an ejection fraction of 20%.  Continue home dose metoprolol, losartan, Aldactone.

## 2023-11-15 NOTE — ED NOTES
Bedside report received from off going RN/tech: Ashlie, assumed care of patient.  POC discussed with patient. Call light within reach, all needs addressed at this time.       Fall risk interventions in place: Not Applicable (all applicable per [facility] Fall risk assessment)   Continuous monitoring: Cardiac Leads, Pulse Ox, or Blood Pressure  IVF/IV medications: Infusion per MAR (List Med(s)) NS, Vancomycin  Oxygen: How many liters 2L  Bedside sitter: Not Applicable   Isolation: Not Applicable

## 2023-11-15 NOTE — PROGRESS NOTES
Hospital Medicine Daily Progress Note    Date of Service  11/15/2023    Chief Complaint  Mina Burch is a 38 y.o. male admitted 11/15/2023 with scrotal pain    Hospital Course  Mina Burch is a 38 y.o. male with past medical history of hypertension, CAD status post stent placement, STEMI, ischemic cardiomyopathy with ejection fraction of 20% who presented 11/15/2023 with scrotal pain and swelling.      Of note, patient has a history of recurrent scrotal abscesses.  He was last seen in outside facility approximately week ago where he had the abscess drained, was sent home on oral antibiotics.  Patient states that he has noticed some worsening pain and swelling on the right side of his scrotum for the last few days.  Denies any fevers, chills, nausea, vomiting.      In the emergency department, CT imaging was obtained showing right-sided scrotal cellulitis with 3 cm abscess. Incision and drainage was performed in the emergency department by the ERP. Patient will be admitted for medical management of scrotal abscess.      Interval Problem Update  11/15: Pain improving. Continue IV antibiotics. No fevers, chills, tachycardia, or hypotension. Cultures pending.    A1c significantly elevated 15.4 on metformin at home. On SSI, added long acting. May consider nutritional as well if remains elevated. May need insulin on DC - consulted diabetic educator for supply recs.      I have discussed this patient's plan of care and discharge plan at IDT rounds today with Case Management, Nursing, Nursing leadership, and other members of the IDT team.    Consultants/Specialty  none    Code Status  Full Code    Disposition  The patient is not medically cleared for discharge to home or a post-acute facility.      I have placed the appropriate orders for post-discharge needs.    Review of Systems  Review of Systems   Constitutional:  Negative for chills and fever.   HENT:  Negative for congestion, ear discharge, ear  pain and nosebleeds.    Respiratory:  Negative for hemoptysis, sputum production and shortness of breath.    Cardiovascular:  Negative for chest pain, palpitations, orthopnea, claudication and leg swelling.   Gastrointestinal:  Negative for abdominal pain, diarrhea, nausea and vomiting.   Genitourinary:  Negative for dysuria, frequency, hematuria and urgency.        Scrotal swelling and pain    Musculoskeletal:  Negative for back pain and neck pain.   Neurological:  Negative for dizziness, tingling, tremors, sensory change and headaches.   Endo/Heme/Allergies:  Negative for environmental allergies. Does not bruise/bleed easily.   Psychiatric/Behavioral:  Negative for depression and suicidal ideas.         Physical Exam  Temp:  [36.2 °C (97.1 °F)-36.7 °C (98.1 °F)] 36.7 °C (98.1 °F)  Pulse:  [53-78] 57  Resp:  [14-18] 16  BP: (115-155)/() 115/81  SpO2:  [90 %-99 %] 90 %    Physical Exam  Vitals and nursing note reviewed.   Constitutional:       General: He is not in acute distress.     Appearance: Normal appearance. He is normal weight. He is not ill-appearing or toxic-appearing.   HENT:      Head: Normocephalic and atraumatic.      Mouth/Throat:      Mouth: Mucous membranes are moist.   Eyes:      Extraocular Movements: Extraocular movements intact.      Conjunctiva/sclera: Conjunctivae normal.   Cardiovascular:      Rate and Rhythm: Normal rate and regular rhythm.      Pulses: Normal pulses.      Heart sounds: Normal heart sounds.   Pulmonary:      Effort: Pulmonary effort is normal.      Breath sounds: Normal breath sounds.   Abdominal:      General: Abdomen is flat.      Palpations: Abdomen is soft.   Genitourinary:     Testes:         Right: Tenderness and swelling present.      Comments: Scrotal swelling and surrounding erythema.  +tender  Musculoskeletal:         General: Normal range of motion.   Skin:     General: Skin is warm and dry.      Capillary Refill: Capillary refill takes less than 2 seconds.    Neurological:      General: No focal deficit present.      Mental Status: He is alert and oriented to person, place, and time. Mental status is at baseline.      Cranial Nerves: No cranial nerve deficit.      Sensory: No sensory deficit.      Motor: No weakness.      Coordination: Coordination normal.   Psychiatric:         Mood and Affect: Mood normal.       Fluids    Intake/Output Summary (Last 24 hours) at 11/15/2023 1820  Last data filed at 11/15/2023 1700  Gross per 24 hour   Intake 2473.44 ml   Output 2300 ml   Net 173.44 ml       Laboratory  Recent Labs     11/15/23  0211   WBC 12.3*   RBC 5.01   HEMOGLOBIN 14.4   HEMATOCRIT 43.0   MCV 85.8   MCH 28.7   MCHC 33.5   RDW 37.1   PLATELETCT 353   MPV 9.4     Recent Labs     11/15/23  0211   SODIUM 133*   POTASSIUM 4.1   CHLORIDE 99   CO2 22   GLUCOSE 465*   BUN 21   CREATININE 1.13   CALCIUM 8.7             Recent Labs     11/15/23  0211   TRIGLYCERIDE 189*   HDL 25*   LDL 48       Imaging  CT-PELVIS WITH   Final Result         1. Right scrotal wall cellulitis and 3 cm scrotal wall abscess.   2. No evidence of Mary Grace's gangrene. No soft tissue air.           Assessment/Plan  * Scrotal abscess- (present on admission)  Assessment & Plan  History of recurrent scrotal abscessed  CT imaging: Right scrotal wall cellulitis and 3 cm scrotal wall abscess.   ERP drained the abscess in the ER  Follow up cultures  Continue with Vanc and Unasyn      Ischemic cardiomyopathy- (present on admission)  Assessment & Plan  Last echocardiogram done in 2020 showed an ejection fraction of 20%.  Continue home dose metoprolol, losartan, Aldactone.    Coronary artery disease involving native coronary artery of native heart with angina pectoris (HCC)- (present on admission)  Assessment & Plan  Reports he was able to stop home plavix and xarelto over a year ago  Continue home aspirin    Type 2 diabetes mellitus with diabetic neuropathy, without long-term current use of insulin ([facility])-  (present on admission)  Assessment & Plan  Poorly controlled diabetes mellitus  A1c 15.4 on metformin 1000mg BID at home  Hold metformin  Insulin sliding scale and long acting  Consider nutritional if persistently elevated  Diabetic diet  Consult diabetic edu for home supply recs    Essential hypertension- (present on admission)  Assessment & Plan  Continue with isosorbide dinitrate, losartan, metoprolol, Aldactone       VTE prophylaxis:    enoxaparin ppx    I have performed a physical exam and reviewed and updated ROS and Plan today (11/15/2023). In review of yesterday's note (11/14/2023), there are no changes except as documented above.

## 2023-11-16 VITALS
OXYGEN SATURATION: 96 % | HEART RATE: 58 BPM | SYSTOLIC BLOOD PRESSURE: 131 MMHG | HEIGHT: 64 IN | BODY MASS INDEX: 35.61 KG/M2 | WEIGHT: 208.56 LBS | RESPIRATION RATE: 20 BRPM | TEMPERATURE: 98.1 F | DIASTOLIC BLOOD PRESSURE: 85 MMHG

## 2023-11-16 LAB
ANION GAP SERPL CALC-SCNC: 10 MMOL/L (ref 7–16)
BUN SERPL-MCNC: 15 MG/DL (ref 8–22)
CALCIUM SERPL-MCNC: 8.8 MG/DL (ref 8.5–10.5)
CHLORIDE SERPL-SCNC: 103 MMOL/L (ref 96–112)
CO2 SERPL-SCNC: 24 MMOL/L (ref 20–33)
CREAT SERPL-MCNC: 0.95 MG/DL (ref 0.5–1.4)
ERYTHROCYTE [DISTWIDTH] IN BLOOD BY AUTOMATED COUNT: 37.6 FL (ref 35.9–50)
GFR SERPLBLD CREATININE-BSD FMLA CKD-EPI: 105 ML/MIN/1.73 M 2
GLUCOSE BLD STRIP.AUTO-MCNC: 216 MG/DL (ref 65–99)
GLUCOSE BLD STRIP.AUTO-MCNC: 267 MG/DL (ref 65–99)
GLUCOSE BLD STRIP.AUTO-MCNC: 306 MG/DL (ref 65–99)
GLUCOSE SERPL-MCNC: 245 MG/DL (ref 65–99)
HCT VFR BLD AUTO: 44.3 % (ref 42–52)
HGB BLD-MCNC: 14.5 G/DL (ref 14–18)
MCH RBC QN AUTO: 28.2 PG (ref 27–33)
MCHC RBC AUTO-ENTMCNC: 32.7 G/DL (ref 32.3–36.5)
MCV RBC AUTO: 86.2 FL (ref 81.4–97.8)
PLATELET # BLD AUTO: 359 K/UL (ref 164–446)
PMV BLD AUTO: 9.3 FL (ref 9–12.9)
POTASSIUM SERPL-SCNC: 4.2 MMOL/L (ref 3.6–5.5)
PROCALCITONIN SERPL-MCNC: 0.07 NG/ML
RBC # BLD AUTO: 5.14 M/UL (ref 4.7–6.1)
SODIUM SERPL-SCNC: 137 MMOL/L (ref 135–145)
WBC # BLD AUTO: 9.5 K/UL (ref 4.8–10.8)

## 2023-11-16 PROCEDURE — 82962 GLUCOSE BLOOD TEST: CPT

## 2023-11-16 PROCEDURE — 700111 HCHG RX REV CODE 636 W/ 250 OVERRIDE (IP): Performed by: STUDENT IN AN ORGANIZED HEALTH CARE EDUCATION/TRAINING PROGRAM

## 2023-11-16 PROCEDURE — A9270 NON-COVERED ITEM OR SERVICE: HCPCS | Performed by: STUDENT IN AN ORGANIZED HEALTH CARE EDUCATION/TRAINING PROGRAM

## 2023-11-16 PROCEDURE — 80048 BASIC METABOLIC PNL TOTAL CA: CPT

## 2023-11-16 PROCEDURE — 85027 COMPLETE CBC AUTOMATED: CPT

## 2023-11-16 PROCEDURE — 99239 HOSP IP/OBS DSCHRG MGMT >30: CPT | Performed by: HOSPITALIST

## 2023-11-16 PROCEDURE — 700102 HCHG RX REV CODE 250 W/ 637 OVERRIDE(OP): Performed by: STUDENT IN AN ORGANIZED HEALTH CARE EDUCATION/TRAINING PROGRAM

## 2023-11-16 PROCEDURE — 84145 PROCALCITONIN (PCT): CPT

## 2023-11-16 PROCEDURE — 36415 COLL VENOUS BLD VENIPUNCTURE: CPT

## 2023-11-16 PROCEDURE — 700105 HCHG RX REV CODE 258: Performed by: STUDENT IN AN ORGANIZED HEALTH CARE EDUCATION/TRAINING PROGRAM

## 2023-11-16 RX ORDER — ASPIRIN 81 MG/1
81 TABLET ORAL DAILY
Qty: 90 TABLET | Refills: 1 | Status: SHIPPED | OUTPATIENT
Start: 2023-11-16

## 2023-11-16 RX ORDER — AMOXICILLIN AND CLAVULANATE POTASSIUM 875; 125 MG/1; MG/1
1 TABLET, FILM COATED ORAL 2 TIMES DAILY
Qty: 20 TABLET | Refills: 0 | Status: ACTIVE | OUTPATIENT
Start: 2023-11-16 | End: 2023-11-26

## 2023-11-16 RX ORDER — INSULIN GLARGINE 100 [IU]/ML
20 INJECTION, SOLUTION SUBCUTANEOUS EVERY EVENING
Qty: 6 ML | Refills: 0 | Status: ACTIVE | OUTPATIENT
Start: 2023-11-16 | End: 2023-12-16

## 2023-11-16 RX ORDER — INSULIN LISPRO 100 [IU]/ML
2-9 INJECTION, SOLUTION INTRAVENOUS; SUBCUTANEOUS
Status: DISCONTINUED | OUTPATIENT
Start: 2023-11-16 | End: 2023-11-16 | Stop reason: HOSPADM

## 2023-11-16 RX ORDER — INSULIN LISPRO 100 [IU]/ML
2-9 INJECTION, SOLUTION INTRAVENOUS; SUBCUTANEOUS
Qty: 9 ML | Refills: 0 | Status: ACTIVE | OUTPATIENT
Start: 2023-11-16 | End: 2023-12-16

## 2023-11-16 RX ADMIN — INSULIN LISPRO 6 UNITS: 100 INJECTION, SOLUTION INTRAVENOUS; SUBCUTANEOUS at 11:44

## 2023-11-16 RX ADMIN — ASPIRIN 81 MG: 81 TABLET, COATED ORAL at 05:45

## 2023-11-16 RX ADMIN — VANCOMYCIN HYDROCHLORIDE 1500 MG: 5 INJECTION, POWDER, LYOPHILIZED, FOR SOLUTION INTRAVENOUS at 06:44

## 2023-11-16 RX ADMIN — VALSARTAN 160 MG: 80 TABLET ORAL at 05:45

## 2023-11-16 RX ADMIN — INSULIN LISPRO 3 UNITS: 100 INJECTION, SOLUTION INTRAVENOUS; SUBCUTANEOUS at 16:56

## 2023-11-16 RX ADMIN — AMPICILLIN AND SULBACTAM 3 G: 1; 2 INJECTION, POWDER, FOR SOLUTION INTRAMUSCULAR; INTRAVENOUS at 05:58

## 2023-11-16 RX ADMIN — METOPROLOL SUCCINATE 100 MG: 100 TABLET, EXTENDED RELEASE ORAL at 05:45

## 2023-11-16 RX ADMIN — AMPICILLIN AND SULBACTAM 3 G: 1; 2 INJECTION, POWDER, FOR SOLUTION INTRAMUSCULAR; INTRAVENOUS at 00:29

## 2023-11-16 RX ADMIN — INSULIN LISPRO 5 UNITS: 100 INJECTION, SOLUTION INTRAVENOUS; SUBCUTANEOUS at 06:47

## 2023-11-16 RX ADMIN — AMPICILLIN AND SULBACTAM 3 G: 1; 2 INJECTION, POWDER, FOR SOLUTION INTRAMUSCULAR; INTRAVENOUS at 11:38

## 2023-11-16 RX ADMIN — SPIRONOLACTONE 25 MG: 50 TABLET ORAL at 05:45

## 2023-11-16 ASSESSMENT — ENCOUNTER SYMPTOMS
CLAUDICATION: 0
HEADACHES: 0
FEVER: 0
NECK PAIN: 0
BACK PAIN: 0
CHILLS: 0
SENSORY CHANGE: 0
TREMORS: 0
HEMOPTYSIS: 0
SHORTNESS OF BREATH: 0
ORTHOPNEA: 0
ABDOMINAL PAIN: 0
PALPITATIONS: 0
BRUISES/BLEEDS EASILY: 0
DEPRESSION: 0
VOMITING: 0
TINGLING: 0
SPUTUM PRODUCTION: 0
NAUSEA: 0
DIZZINESS: 0
DIARRHEA: 0

## 2023-11-16 ASSESSMENT — COGNITIVE AND FUNCTIONAL STATUS - GENERAL
SUGGESTED CMS G CODE MODIFIER DAILY ACTIVITY: CH
SUGGESTED CMS G CODE MODIFIER MOBILITY: CH
DAILY ACTIVITIY SCORE: 24
MOBILITY SCORE: 24

## 2023-11-16 ASSESSMENT — PAIN DESCRIPTION - PAIN TYPE: TYPE: ACUTE PAIN;SURGICAL PAIN

## 2023-11-16 NOTE — CONSULTS
Diabetes education: Pt has a hx of diabetes, on Metformin, prior to admit. Pt also tests his blood sugars at home. Pt was admitted with blood sugar of 465, and Hga1c of 15.4%.  Pt is currently on Glargine 19 units pm with Lispro per sliding scale coverage ac and hs. Blood sugars are 346 ( 6 units), and 273 ( 5 units : pt had eaten his  may dish but not the milk nor fruit when finger stick done).  Met with pt this pm. Reviewed diabetes, what effects blood sugars, goals for blood sugars, need for insulin at this time, how insulin works, and need to give his insulin with nursing. Pt states prior to admit his blood sugars were in the 150's. Pt states he works 3 12 hour shift/4 12 hour shifts, days, does not always eat breakfast, but does eat lunch and dinner. Reviewed need for protein and controlled carbs with every meal.  Plan: Pt has a meter and supplies at home. If to discharge on insulin please order Lantus solostar pens, ( 30 days), one refill, Humalog kwik pens, with sliding scale written out and for ac only, (for 30 days ), one refill, and insulin pen needles. Please confirm pt needs a PCP before discharge. CDE to follow up tomorrow.

## 2023-11-16 NOTE — PROGRESS NOTES
4 Eyes Skin Assessment Completed by ANA Chang and ANA Pabon.    Head WDL  Ears WDL  Nose WDL  Mouth WDL  Neck WDL  Breast/Chest WDL  Shoulder Blades WDL  Spine WDL  (R) Arm/Elbow/Hand WDL  (L) Arm/Elbow/Hand WDL  Abdomen WDL  Groin WDL  Scrotum/Coccyx/Buttocks R scrotal abscess.  (R) Leg WDL  (L) Leg WDL  (R) Heel/Foot/Toe WDL  (L) Heel/Foot/Toe WDL          Devices In Places Blood Pressure Cuff, Pulse Ox, and SCD's      Interventions In Place Pillows and Pressure Redistribution Mattress    Possible Skin Injury No    Pictures Uploaded Into Epic N/A  Wound Consult Placed N/A  RN Wound Prevention Protocol Ordered No

## 2023-11-16 NOTE — PROGRESS NOTES
Hospital Medicine Daily Progress Note    Date of Service  11/16/2023    Chief Complaint  Mina Burch is a 38 y.o. male admitted 11/15/2023 with scrotal pain    Hospital Course  Mina Burch is a 38 y.o. male with past medical history of hypertension, CAD status post stent placement, STEMI, ischemic cardiomyopathy with ejection fraction of 20% who presented 11/15/2023 with scrotal pain and swelling.      Of note, patient has a history of recurrent scrotal abscesses.  He was last seen in outside facility approximately week ago where he had the abscess drained, was sent home on oral antibiotics.  Patient states that he has noticed some worsening pain and swelling on the right side of his scrotum for the last few days.  Denies any fevers, chills, nausea, vomiting.      In the emergency department, CT imaging was obtained showing right-sided scrotal cellulitis with 3 cm abscess. Incision and drainage was performed in the emergency department by the ERP. Patient will be admitted for medical management of scrotal abscess.      Interval Problem Update  11/15: Pain improving. Continue IV antibiotics. No fevers, chills, tachycardia, or hypotension. Cultures pending.    A1c significantly elevated 15.4 on metformin at home. On SSI, added long acting. May consider nutritional as well if remains elevated. May need insulin on DC - consulted diabetic educator for supply recs.      11/16 patient is new to me today, patient is in bed, no fever or chills, on iv vanco and unasyn, prelim cx showing gpc, monitoring for side effects from abx, monitoring vanco levels and bmp.   Lantus increased to 22 units qhs.       I have discussed this patient's plan of care and discharge plan at IDT rounds today with Case Management, Nursing, Nursing leadership, and other members of the IDT team.    Consultants/Specialty  none    Code Status  Full Code    Disposition  The patient is not medically cleared for discharge to home or  a post-acute facility.      I have placed the appropriate orders for post-discharge needs.    Review of Systems  Review of Systems   Constitutional:  Negative for chills and fever.   HENT:  Negative for congestion, ear discharge, ear pain and nosebleeds.    Respiratory:  Negative for hemoptysis, sputum production and shortness of breath.    Cardiovascular:  Negative for chest pain, palpitations, orthopnea, claudication and leg swelling.   Gastrointestinal:  Negative for abdominal pain, diarrhea, nausea and vomiting.   Genitourinary:  Negative for dysuria, frequency, hematuria and urgency.        Scrotal swelling and pain    Musculoskeletal:  Negative for back pain and neck pain.   Neurological:  Negative for dizziness, tingling, tremors, sensory change and headaches.   Endo/Heme/Allergies:  Negative for environmental allergies. Does not bruise/bleed easily.   Psychiatric/Behavioral:  Negative for depression and suicidal ideas.         Physical Exam  Temp:  [36.1 °C (97 °F)-36.7 °C (98.1 °F)] 36.6 °C (97.9 °F)  Pulse:  [57-65] 58  Resp:  [16-20] 20  BP: (113-135)/(73-92) 135/90  SpO2:  [90 %-98 %] 98 %    Physical Exam  Vitals and nursing note reviewed.   Constitutional:       General: He is not in acute distress.     Appearance: Normal appearance. He is normal weight. He is not ill-appearing or toxic-appearing.   HENT:      Head: Normocephalic and atraumatic.      Mouth/Throat:      Mouth: Mucous membranes are moist.   Eyes:      Extraocular Movements: Extraocular movements intact.      Conjunctiva/sclera: Conjunctivae normal.   Cardiovascular:      Rate and Rhythm: Normal rate and regular rhythm.      Pulses: Normal pulses.      Heart sounds: Normal heart sounds.   Pulmonary:      Effort: Pulmonary effort is normal.      Breath sounds: Normal breath sounds.   Abdominal:      General: Abdomen is flat.      Palpations: Abdomen is soft.   Genitourinary:     Testes:         Right: Tenderness and swelling present.       Comments: Scrotal swelling and surrounding erythema.  +tender + clear discharge.   Musculoskeletal:         General: Normal range of motion.   Skin:     General: Skin is warm and dry.      Capillary Refill: Capillary refill takes less than 2 seconds.   Neurological:      General: No focal deficit present.      Mental Status: He is alert and oriented to person, place, and time. Mental status is at baseline.      Cranial Nerves: No cranial nerve deficit.      Sensory: No sensory deficit.      Motor: No weakness.      Coordination: Coordination normal.   Psychiatric:         Mood and Affect: Mood normal.       Fluids    Intake/Output Summary (Last 24 hours) at 11/16/2023 1421  Last data filed at 11/16/2023 1100  Gross per 24 hour   Intake 120 ml   Output 1450 ml   Net -1330 ml         Laboratory  Recent Labs     11/15/23  0211 11/16/23  0450   WBC 12.3* 9.5   RBC 5.01 5.14   HEMOGLOBIN 14.4 14.5   HEMATOCRIT 43.0 44.3   MCV 85.8 86.2   MCH 28.7 28.2   MCHC 33.5 32.7   RDW 37.1 37.6   PLATELETCT 353 359   MPV 9.4 9.3       Recent Labs     11/15/23  0211 11/16/23  0450   SODIUM 133* 137   POTASSIUM 4.1 4.2   CHLORIDE 99 103   CO2 22 24   GLUCOSE 465* 245*   BUN 21 15   CREATININE 1.13 0.95   CALCIUM 8.7 8.8               Recent Labs     11/15/23  0211   TRIGLYCERIDE 189*   HDL 25*   LDL 48         Imaging  CT-PELVIS WITH   Final Result         1. Right scrotal wall cellulitis and 3 cm scrotal wall abscess.   2. No evidence of Mary Grace's gangrene. No soft tissue air.           Assessment/Plan  * Scrotal abscess- (present on admission)  Assessment & Plan  History of recurrent scrotal abscessed  CT imaging: Right scrotal wall cellulitis and 3 cm scrotal wall abscess.   ERP drained the abscess in the ER  Follow up cultures  Continue with Vanc and Unasyn    Fu culture      Ischemic cardiomyopathy- (present on admission)  Assessment & Plan  Last echocardiogram done in 2020 showed an ejection fraction of 20%.  Continue home  dose metoprolol, losartan, Aldactone.    Coronary artery disease involving native coronary artery of native heart with angina pectoris (HCC)- (present on admission)  Assessment & Plan  Reports he was able to stop home plavix and xarelto over a year ago  Continue home aspirin    Type 2 diabetes mellitus with diabetic neuropathy, without long-term current use of insulin (HCC)- (present on admission)  Assessment & Plan  Poorly controlled diabetes mellitus  A1c 15.4 on metformin 1000mg BID at home  Hold metformin  Insulin sliding scale and long acting  Consider nutritional if persistently elevated  Diabetic diet  Consult diabetic edu for home supply recs    Will need to be dc home on insulin  Dm educator consulted.     Essential hypertension- (present on admission)  Assessment & Plan  Continue with isosorbide dinitrate, losartan, metoprolol, Aldactone       VTE prophylaxis: lovenox    I have performed a physical exam and reviewed and updated ROS and Plan today (11/16/2023). In review of yesterday's note (11/15/2023), there are no changes except as documented above.        Greater than 51 minutes spent prepping to see patient (e.g. reviewing  tests/imaging results, notes from consultants, bedside nurse, night shift ) obtaining and/or reviewing separately obtained history. Performing a medically appropriate examination and evaluation.  Counseling and educating the patient.  Ordering medications, tests, or procedures.  Referring and communicating with other health care professionals.  Documenting clinical information in EPIC.  Independently interpreting results and communicating results to patient.  Care coordination.

## 2023-11-16 NOTE — HOSPITAL COURSE
Mina Burch is a 38 y.o. male with past medical history of hypertension, CAD status post stent placement, STEMI, ischemic cardiomyopathy with ejection fraction of 20% who presented 11/15/2023 with scrotal pain and swelling.      Of note, patient has a history of recurrent scrotal abscesses.  He was last seen in outside facility approximately week ago where he had the abscess drained, was sent home on oral antibiotics.  Patient states that he has noticed some worsening pain and swelling on the right side of his scrotum for the last few days.  Denies any fevers, chills, nausea, vomiting.      In the emergency department, CT imaging was obtained showing right-sided scrotal cellulitis with 3 cm abscess. Incision and drainage was performed in the emergency department by the ERP. Patient will be admitted for medical management of scrotal abscess.

## 2023-11-16 NOTE — PROGRESS NOTES
Diabetes education: Met with pt this pm. Please see consult note.  Plan: Pt has a meter and supplies at home. If to discharge on insulin please order Lantus solostar pens, ( 30 days), one refill, Humalog kwik pens, with sliding scale written out and for ac only, (for 30 days ), one refill, and insulin pen needles. Please confirm pt needs a PCP before discharge. CDE to follow up tomorrow.

## 2023-11-16 NOTE — CARE PLAN
The patient is Stable - Low risk of patient condition declining or worsening    Shift Goals  Clinical Goals: monitor glucose  Patient Goals: rest  Family Goals: awa    Progress made toward(s) clinical / shift goals:       Problem: Knowledge Deficit - Standard  Goal: Patient and family/care givers will demonstrate understanding of plan of care, disease process/condition, diagnostic tests and medications  Outcome: Progressing     Problem: Pain - Standard  Goal: Alleviation of pain or a reduction in pain to the patient’s comfort goal  Outcome: Progressing     Patient declines pain at this time. Patient verbalized understanding of POC.    Patient is not progressing towards the following goals:

## 2023-11-16 NOTE — CARE PLAN
The patient is Stable - Low risk of patient condition declining or worsening    Shift Goals  Clinical Goals: iv abx  Patient Goals: rest    Progress made toward(s) clinical / shift goals:    Problem: Knowledge Deficit - Standard  Goal: Patient and family/care givers will demonstrate understanding of plan of care, disease process/condition, diagnostic tests and medications  Description: Target End Date:  1-3 days or as soon as patient condition allows    Document in Patient Education    1.  Patient and family/caregiver oriented to unit, equipment, visitation policy and means for communicating concern  2.  Complete/review Learning Assessment  3.  Assess knowledge level of disease process/condition, treatment plan, diagnostic tests and medications  4.  Explain disease process/condition, treatment plan, diagnostic tests and medications  Outcome: Progressing       Patient is not progressing towards the following goals:

## 2023-11-17 LAB
BACTERIA UR CULT: NORMAL
BACTERIA WND AEROBE CULT: ABNORMAL
BACTERIA WND AEROBE CULT: ABNORMAL
GRAM STN SPEC: ABNORMAL
SIGNIFICANT IND 70042: ABNORMAL
SIGNIFICANT IND 70042: NORMAL
SITE SITE: ABNORMAL
SITE SITE: NORMAL
SOURCE SOURCE: ABNORMAL
SOURCE SOURCE: NORMAL

## 2023-11-17 NOTE — PROGRESS NOTES
Patient requesting to DC. Patient verbalized he needed to go home as his sitter for his 2 year old was no longer able to stay. Discussed with MD Mcdaniels patient not cleared to DC until wound cultures return and blood sugars are under control. Educated patient extensively regarding importance of staying and continuing IV antibiotic therapy.  Patient able to verbalize risks of leaving against medical advice and not completing therapies including worsening infection, sepsis and death. MD notified patient was choosing to leave against medical advice. MD will prescribe PO abx to home pharmacy - [facility]. Patient educated to follow up with PCP as soon as possible and  return to ER for any fevers, chills, chest pain, shortness of breath or signs of worsening infection. Patient verbalized understanding. Patient sent with dressing supplies, belongings collected by patient prior to leaving.

## 2023-11-17 NOTE — CARE PLAN
The patient is Stable - Low risk of patient condition declining or worsening    Shift Goals  Clinical Goals: finger sticks, dressing maintenance, OOB activity  Patient Goals: rest  Family Goals: awa    Progress made toward(s) clinical / shift goals:  blood sugar controlled per sliding scale. Wound consulted for previous I&D site. Patient providing self care. Patient ambulating in room ad haider, able to rest between periods of activity.       Problem: Knowledge Deficit - Standard  Goal: Patient and family/care givers will demonstrate understanding of plan of care, disease process/condition, diagnostic tests and medications  Outcome: Progressing     Problem: Pain - Standard  Goal: Alleviation of pain or a reduction in pain to the patient’s comfort goal  Outcome: Progressing       Patient is not progressing towards the following goals:

## 2023-11-17 NOTE — PROGRESS NOTES
Diabetes education: Met with pt this afternoon, before he went AMA. Reviewed hyper and hypoglycemia, insulin , insulin pens, storage shelf life and site rotation. Pt practiced with saline pens and practice device. Handout given.  CDE called security as pt was concerned regarding his truck. Pt made security aware of his truck parked at the ER entrance. Questions answered.

## 2023-11-17 NOTE — DOCUMENTATION QUERY
[facility]                                                                       Query Response Note      PATIENT:               BRYCE GATES  ACCT #:                  [id number]  MRN:                     [id number]  :                      1985  ADMIT DATE:       11/15/2023 1:28 AM  DISCH DATE:        2023 6:27 PM  RESPONDING  PROVIDER #:        [id number]           QUERY TEXT:    Please clarify in documentation the relationship, if any, between right scrotal abscess with surrounding cellulitis and DM2 with hyperglycemia:    The patient's Clinical Indicators include:  Findings:  --Patient admitted for right sided scrotal abscess with surrounding cellulitis  --Per H&P, ''DM2 poorly controlled'' documented  --Glucose on admission 11/15:  465  --Glucose :  245  --Pelvic CT on 11/15 admission:  Right scrotal wall cellulitis and 3cm scrotal wall abscess. No evidence of      Mary Grace's gangrene. No soft tissue air    Treatment:  --S/p I&D in the ER  --Pelvic CT  --Sliding scale insulin, hold Metformin  --IV Unasyn/Vancomycin    Risk Factors:  --Poorly controlled DM2 with hyperglycemia  --Right side scrotal abscess with surrounding cellulitis    Thank you,  Ambar Oliveira RN, BSN  Clinical   Connect via [employer]  Options provided:   -- Right scrotal abscess with surrounding cellulitis is due to or associated with DM2 with hyperglycemia   -- Right scrotal abscess with surrounding cellulitis is not due to or associated with DM2 with hyperglycemia   -- Other explanation, please specify   -- Unable to determine      Query created by: Ambar Oliveira on 2023 6:45 AM    RESPONSE TEXT:    Right scrotal abscess with surrounding cellulitis is due to or associated with DM2 with hyperglycemia          Electronically signed by:  RONNY PALOMARES 2023 7:29 PM

## 2023-11-17 NOTE — DISCHARGE SUMMARY
Discharge Summary AMA    CHIEF COMPLAINT ON ADMISSION  Chief Complaint   Patient presents with    Abscess     X 1 week       Reason for Admission  Transfer from [facility]     Admission Date  11/15/2023    CODE STATUS  Full Code    HPI & HOSPITAL COURSE    Please see original H&P for specific information    Mina Burch is a 38 y.o. male with past medical history of hypertension, CAD status post stent placement, STEMI, ischemic cardiomyopathy with ejection fraction of 20% who presented 11/15/2023 with scrotal pain and swelling.      Of note, patient has a history of recurrent scrotal abscesses.  He was last seen in outside facility approximately week ago where he had the abscess drained, was sent home on oral antibiotics.  Patient states that he has noticed some worsening pain and swelling on the right side of his scrotum for the last few days.  Denies any fevers, chills, nausea, vomiting.      In the emergency department, CT imaging was obtained showing right-sided scrotal cellulitis with 3 cm abscess. Incision and drainage was performed in the emergency department by the ERP. Patient will be admitted for medical management of scrotal abscess.      Patient was admitted due to scrotal abscess,, CT showed cellulitis and a 3 cm abscess that was drained in the emergency room, cultures were positive for gram-positive cocci, patient had a negative MRSA screen, patient was on vancomycin and Unasyn and then de-escalated to Unasyn, patient on evaluation this morning he was having swelling and still having discharge, plan was to keep patient for IV antibiotics and reassess in a.m. and also patient having hyperglycemia and we will adjusting his insulin dose, diabetic educator evaluated and educated patient, later today patient stated that he wanted to leave AGAINST MEDICAL ADVICE he stated that he had a child that he needed to go and see, patient was educated regarding risk of going AGAINST MEDICAL ADVICE regarding  worsening infection and also regarding uncontrolled diabetes and that we were adjusting his insulin, patient expressed understanding but he decided that he wants to leave the hospital, patient understand that he needs to follow-up with primary care doctor or come back to the emergency room if any signs of worsening infection like but not limited to fever, swelling, tenderness, increasing discharge patient expressed understanding, due to his infection and seems to be were able to get preliminary report from his culture showed Streptococcus I have sent prescription for antibiotics to his pharmacy, also I have sent prescription for insulin since patient was educated by diabetic educator and he has been on insulin before and he knows has to inject his insulin he understand that he needs to watch for hypoglycemic episodes, patient again he has decided to leave AGAINST MEDICAL ADVICE, patient to the best of my knowledge he is able to make his own decisions he is alert oriented, discussed with charge nurse.        Discharge Date AMA  11/16/23    FOLLOW UP ITEMS POST DISCHARGE  Primary care  Come back to ER at any time    DISCHARGE DIAGNOSES  Principal Problem:    Scrotal abscess (POA: Yes)  Active Problems:    Essential hypertension (POA: Yes)    Type 2 diabetes mellitus with diabetic neuropathy, without long-term current use of insulin (HCC) (POA: Yes)    Coronary artery disease involving native coronary artery of native heart with angina pectoris (HCC) (POA: Yes)    Ischemic cardiomyopathy (POA: Yes)  Resolved Problems:    * No resolved hospital problems. *      FOLLOW UP  No future appointments.  No follow-up provider specified.    MEDICATIONS ON DISCHARGE     Medication List        START taking these medications        Instructions   amoxicillin-clavulanate 875-125 MG Tabs  Commonly known as: Augmentin   Take 1 Tablet by mouth 2 times a day for 10 days.  Dose: 1 Tablet     insulin lispro 100 UNIT/ML Sopn injection  PEN  Commonly known as: HumaLOG/AdmeLOG   Inject 2-9 Units under the skin 3 times a day before meals for 30 days. Take as per scale: 151-200: 2 u, 201-250: 3 u, 251-300: 5u, 301-350: 6 u, 351-400: 8 u, more than 400: 9 u.  Dose: 2-9 Units     Insulin Pen Needle 32 G x 4 mm   Doctor's comments: Per patient/formulary preference. ICD-10 code: E11.65 Uncontrolled type 2 Diabetes Mellitus  Use one pen needle in pen device to inject insulin four times daily.     Lantus SoloStar 100 UNIT/ML Sopn injection  Generic drug: insulin glargine   Inject 20 Units under the skin every evening for 30 days.  Dose: 20 Units            CONTINUE taking these medications        Instructions   allopurinol 100 MG Tabs  Commonly known as: Zyloprim   Take 1 Tab by mouth every day.  Dose: 100 mg     aspirin 81 MG EC tablet   Doctor's comments: This rx was submitted by a pharmacist working under a collaborative practice agreement.  Take 1 Tablet by mouth every day.  Dose: 81 mg     atorvastatin 40 MG Tabs  Commonly known as: Lipitor   Doctor's comments: This rx was submitted by a pharmacist working under a collaborative practice agreement.  Take 1 Tab by mouth every evening.  Dose: 40 mg     isosorbide dinitrate 10 MG Tabs  Commonly known as: Isordil   Doctor's comments: meds to beds  Take 0.5 Tabs by mouth 2 times a day as needed (chest pain).  Dose: 5 mg     metFORMIN 500 MG Tabs  Commonly known as: Glucophage   Take 1,000 mg by mouth 2 times a day.  Dose: 1,000 mg     metoprolol  MG Tb24  Commonly known as: Toprol XL   Take 1 Tab by mouth 2 Times a Day.  Dose: 100 mg     spironolactone 25 MG Tabs  Commonly known as: Aldactone   TAKE 1 TABLET BY MOUTH EVERY DAY     valsartan 160 MG Tabs  Commonly known as: Diovan   Take 160 mg by mouth 2 times a day.  Dose: 160 mg            STOP taking these medications      doxycycline 100 MG Caps  Commonly known as: Vibramycin              Allergies  No Known Allergies    DIET  Orders Placed This  Encounter   Procedures    Diet Order Diet: Consistent CHO (Diabetic)     Standing Status:   Standing     Number of Occurrences:   1     Order Specific Question:   Diet:     Answer:   Consistent CHO (Diabetic) [4]       ACTIVITY  AMA    CONSULTATIONS  None    PROCEDURES  Bedside I&D by ER physician    LABORATORY  Lab Results   Component Value Date    SODIUM 137 11/16/2023    POTASSIUM 4.2 11/16/2023    CHLORIDE 103 11/16/2023    CO2 24 11/16/2023    GLUCOSE 245 (H) 11/16/2023    BUN 15 11/16/2023    CREATININE 0.95 11/16/2023        Lab Results   Component Value Date    WBC 9.5 11/16/2023    HEMOGLOBIN 14.5 11/16/2023    HEMATOCRIT 44.3 11/16/2023    PLATELETCT 359 11/16/2023        Total time of the discharge process exceeds 33 minutes.

## 2023-11-20 ENCOUNTER — TELEPHONE (OUTPATIENT)
Dept: HEALTH INFORMATION MANAGEMENT | Facility: OTHER | Age: 38
End: 2023-11-20
Payer: COMMERCIAL

## 2023-11-20 LAB
BACTERIA BLD CULT: NORMAL
BACTERIA BLD CULT: NORMAL
SIGNIFICANT IND 70042: NORMAL
SIGNIFICANT IND 70042: NORMAL
SITE SITE: NORMAL
SITE SITE: NORMAL
SOURCE SOURCE: NORMAL
SOURCE SOURCE: NORMAL

## 2024-08-02 ENCOUNTER — HOSPITAL ENCOUNTER (OUTPATIENT)
Dept: LAB | Facility: MEDICAL CENTER | Age: 39
End: 2024-08-02
Attending: STUDENT IN AN ORGANIZED HEALTH CARE EDUCATION/TRAINING PROGRAM
Payer: COMMERCIAL

## 2024-08-02 LAB
ANION GAP SERPL CALC-SCNC: 14 MMOL/L (ref 7–16)
BUN SERPL-MCNC: 17 MG/DL (ref 8–22)
CALCIUM SERPL-MCNC: 8.4 MG/DL (ref 8.5–10.5)
CHLORIDE SERPL-SCNC: 98 MMOL/L (ref 96–112)
CHOLEST SERPL-MCNC: 190 MG/DL (ref 100–199)
CO2 SERPL-SCNC: 20 MMOL/L (ref 20–33)
CREAT SERPL-MCNC: 1.21 MG/DL (ref 0.5–1.4)
EST. AVERAGE GLUCOSE BLD GHB EST-MCNC: 344 MG/DL
FASTING STATUS PATIENT QL REPORTED: NORMAL
GFR SERPLBLD CREATININE-BSD FMLA CKD-EPI: 78 ML/MIN/1.73 M 2
GLUCOSE SERPL-MCNC: 439 MG/DL (ref 65–99)
HBA1C MFR BLD: 13.6 % (ref 4–5.6)
HDLC SERPL-MCNC: 26 MG/DL
LDLC SERPL CALC-MCNC: ABNORMAL MG/DL
POTASSIUM SERPL-SCNC: 4.1 MMOL/L (ref 3.6–5.5)
SODIUM SERPL-SCNC: 132 MMOL/L (ref 135–145)
TRIGL SERPL-MCNC: 477 MG/DL (ref 0–149)

## 2024-08-02 PROCEDURE — 83695 ASSAY OF LIPOPROTEIN(A): CPT

## 2024-08-02 PROCEDURE — 36415 COLL VENOUS BLD VENIPUNCTURE: CPT

## 2024-08-02 PROCEDURE — 80061 LIPID PANEL: CPT

## 2024-08-02 PROCEDURE — 83036 HEMOGLOBIN GLYCOSYLATED A1C: CPT

## 2024-08-02 PROCEDURE — 80048 BASIC METABOLIC PNL TOTAL CA: CPT

## 2024-08-05 LAB — LPA SERPL-MCNC: <6 MG/DL

## 2025-08-12 ENCOUNTER — HOSPITAL ENCOUNTER (OUTPATIENT)
Dept: LAB | Facility: MEDICAL CENTER | Age: 40
End: 2025-08-12
Payer: COMMERCIAL

## 2025-08-12 LAB
ALBUMIN SERPL BCP-MCNC: 4.2 G/DL (ref 3.2–4.9)
ALBUMIN/GLOB SERPL: 1.6 G/DL
ALP SERPL-CCNC: 56 U/L (ref 30–99)
ALT SERPL-CCNC: 38 U/L (ref 2–50)
ANION GAP SERPL CALC-SCNC: 11 MMOL/L (ref 7–16)
AST SERPL-CCNC: 28 U/L (ref 12–45)
BASOPHILS # BLD AUTO: 1.8 % (ref 0–1.8)
BASOPHILS # BLD: 0.15 K/UL (ref 0–0.12)
BILIRUB SERPL-MCNC: 0.9 MG/DL (ref 0.1–1.5)
BUN SERPL-MCNC: 16 MG/DL (ref 8–22)
CALCIUM ALBUM COR SERPL-MCNC: 9 MG/DL (ref 8.5–10.5)
CALCIUM SERPL-MCNC: 9.2 MG/DL (ref 8.5–10.5)
CHLORIDE SERPL-SCNC: 103 MMOL/L (ref 96–112)
CHOLEST SERPL-MCNC: 160 MG/DL (ref 100–199)
CO2 SERPL-SCNC: 20 MMOL/L (ref 20–33)
CREAT SERPL-MCNC: 1.19 MG/DL (ref 0.5–1.4)
EOSINOPHIL # BLD AUTO: 0.18 K/UL (ref 0–0.51)
EOSINOPHIL NFR BLD: 2.1 % (ref 0–6.9)
ERYTHROCYTE [DISTWIDTH] IN BLOOD BY AUTOMATED COUNT: 36.9 FL (ref 35.9–50)
EST. AVERAGE GLUCOSE BLD GHB EST-MCNC: 364 MG/DL
FASTING STATUS PATIENT QL REPORTED: NORMAL
GFR SERPLBLD CREATININE-BSD FMLA CKD-EPI: 79 ML/MIN/1.73 M 2
GLOBULIN SER CALC-MCNC: 2.7 G/DL (ref 1.9–3.5)
GLUCOSE SERPL-MCNC: 336 MG/DL (ref 65–99)
HBA1C MFR BLD: 14.3 % (ref 4–5.6)
HCT VFR BLD AUTO: 47.4 % (ref 42–52)
HDLC SERPL-MCNC: 34 MG/DL
HGB BLD-MCNC: 16.4 G/DL (ref 14–18)
IMM GRANULOCYTES # BLD AUTO: 0.03 K/UL (ref 0–0.11)
IMM GRANULOCYTES NFR BLD AUTO: 0.4 % (ref 0–0.9)
LDLC SERPL CALC-MCNC: 90 MG/DL
LYMPHOCYTES # BLD AUTO: 2.49 K/UL (ref 1–4.8)
LYMPHOCYTES NFR BLD: 29.4 % (ref 22–41)
MCH RBC QN AUTO: 28.6 PG (ref 27–33)
MCHC RBC AUTO-ENTMCNC: 34.6 G/DL (ref 32.3–36.5)
MCV RBC AUTO: 82.7 FL (ref 81.4–97.8)
MONOCYTES # BLD AUTO: 0.6 K/UL (ref 0–0.85)
MONOCYTES NFR BLD AUTO: 7.1 % (ref 0–13.4)
NEUTROPHILS # BLD AUTO: 5.01 K/UL (ref 1.82–7.42)
NEUTROPHILS NFR BLD: 59.2 % (ref 44–72)
NRBC # BLD AUTO: 0 K/UL
NRBC BLD-RTO: 0 /100 WBC (ref 0–0.2)
PLATELET # BLD AUTO: 307 K/UL (ref 164–446)
PMV BLD AUTO: 10 FL (ref 9–12.9)
POTASSIUM SERPL-SCNC: 5 MMOL/L (ref 3.6–5.5)
PROT SERPL-MCNC: 6.9 G/DL (ref 6–8.2)
PSA SERPL DL<=0.01 NG/ML-MCNC: 0.87 NG/ML (ref 0–4)
RBC # BLD AUTO: 5.73 M/UL (ref 4.7–6.1)
SODIUM SERPL-SCNC: 134 MMOL/L (ref 135–145)
TRIGL SERPL-MCNC: 178 MG/DL (ref 0–149)
WBC # BLD AUTO: 8.5 K/UL (ref 4.8–10.8)

## 2025-08-12 PROCEDURE — 82043 UR ALBUMIN QUANTITATIVE: CPT

## 2025-08-12 PROCEDURE — 80053 COMPREHEN METABOLIC PANEL: CPT

## 2025-08-12 PROCEDURE — 82570 ASSAY OF URINE CREATININE: CPT

## 2025-08-12 PROCEDURE — 85025 COMPLETE CBC W/AUTO DIFF WBC: CPT

## 2025-08-12 PROCEDURE — 84153 ASSAY OF PSA TOTAL: CPT

## 2025-08-12 PROCEDURE — 36415 COLL VENOUS BLD VENIPUNCTURE: CPT

## 2025-08-12 PROCEDURE — 83036 HEMOGLOBIN GLYCOSYLATED A1C: CPT

## 2025-08-12 PROCEDURE — 80061 LIPID PANEL: CPT

## 2025-08-14 LAB
COLLECT DURATION TIME SPEC: NORMAL HR
CREAT 24H UR-MCNC: 54 MG/DL
MICROALBUMIN 24H UR-MCNC: <0.3 MG/DL
MICROALBUMIN/CREAT 24H UR: <6 MG/G (ref 0–30)
SPECIMEN VOL ?TM UR: NORMAL ML